# Patient Record
Sex: FEMALE | Race: WHITE | NOT HISPANIC OR LATINO | Employment: FULL TIME | URBAN - METROPOLITAN AREA
[De-identification: names, ages, dates, MRNs, and addresses within clinical notes are randomized per-mention and may not be internally consistent; named-entity substitution may affect disease eponyms.]

---

## 2017-05-22 ENCOUNTER — PRENATAL OFFICE VISIT (OUTPATIENT)
Dept: OBGYN | Facility: CLINIC | Age: 32
End: 2017-05-22
Payer: COMMERCIAL

## 2017-05-22 DIAGNOSIS — Z34.80 PRENATAL CARE, SUBSEQUENT PREGNANCY: Primary | ICD-10-CM

## 2017-05-22 PROCEDURE — 99207 ZZC NO CHARGE NURSE ONLY: CPT | Performed by: OBSTETRICS & GYNECOLOGY

## 2017-05-22 RX ORDER — PRENATAL VIT/IRON FUM/FOLIC AC 27MG-0.8MG
1 TABLET ORAL DAILY
COMMUNITY
Start: 2017-05-19 | End: 2018-02-19

## 2017-05-22 NOTE — MR AVS SNAPSHOT
"              After Visit Summary   5/22/2017    Sandra Steve    MRN: [id number]           Patient Information     Date Of Birth          1985        Visit Information        Provider Department      5/22/2017 6:00 PM Chika Phillips MD [facility]        Today's Diagnoses     Prenatal care, subsequent pregnancy    -  1       Follow-ups after your visit        Your next 10 appointments already scheduled     May 23, 2017  1:15 PM CDT   New Prenatal with Chika Phillips MD, [facility] 1   [facility] ([facility])    [address] [address]   [phone]              Who to contact     If you have questions or need follow up information about today's clinic visit or your schedule please contact [facility] directly at [phone].  Normal or non-critical lab and imaging results will be communicated to you by MyChart, letter or phone within 4 business days after the clinic has received the results. If you do not hear from us within 7 days, please contact the clinic through MyChart or phone. If you have a critical or abnormal lab result, we will notify you by phone as soon as possible.  Submit refill requests through Solvonics or call your pharmacy and they will forward the refill request to us. Please allow 3 business days for your refill to be completed.          Additional Information About Your Visit        MyChart Information     Solvonics lets you send messages to your doctor, view your test results, renew your prescriptions, schedule appointments and more. To sign up, go to [email or web address] . Click on \"Log in\" on the left side of the screen, which will take you to the Welcome page. Then click on \"Sign up Now\" on the right side of the page.     You will be asked to enter the access code listed below, as well as some personal information. Please follow the directions to create your username and password.     Your access " code is: [email or web address]  Expires: 2017  6:14 PM     Your access code will  in 90 days. If you need help or a new code, please call your [place] clinic or [phone].        Care EveryWhere ID     This is your Care EveryWhere ID. This could be used by other organizations to access your [place] medical records  [id number]        Your Vitals Were     Last Period                   (LMP Unknown)            Blood Pressure from Last 3 Encounters:   No data found for BP    Weight from Last 3 Encounters:   No data found for Wt              Today, you had the following     No orders found for display       Primary Care Provider    None Specified       No primary provider on file.        Thank you!     Thank you for choosing [facility]  for your care. Our goal is always to provide you with excellent care. Hearing back from our patients is one way we can continue to improve our services. Please take a few minutes to complete the written survey that you may receive in the mail after your visit with us. Thank you!             Your Updated Medication List - Protect others around you: Learn how to safely use, store and throw away your medicines at www.disposemymeds.org.          This list is accurate as of: 17  6:14 PM.  Always use your most recent med list.                   Brand Name Dispense Instructions for use    prenatal multivitamin  plus iron 27-0.8 MG Tabs per tablet      Take 1 tablet by mouth daily

## 2017-05-23 ENCOUNTER — PRENATAL OFFICE VISIT (OUTPATIENT)
Dept: OBGYN | Facility: CLINIC | Age: 32
End: 2017-05-23
Payer: COMMERCIAL

## 2017-05-23 VITALS
DIASTOLIC BLOOD PRESSURE: 62 MMHG | HEART RATE: 64 BPM | BODY MASS INDEX: 27.36 KG/M2 | WEIGHT: 154.4 LBS | HEIGHT: 63 IN | SYSTOLIC BLOOD PRESSURE: 104 MMHG

## 2017-05-23 DIAGNOSIS — Z34.90 EARLY STAGE OF PREGNANCY: Primary | ICD-10-CM

## 2017-05-23 PROCEDURE — 76817 TRANSVAGINAL US OBSTETRIC: CPT | Performed by: OBSTETRICS & GYNECOLOGY

## 2017-05-23 PROCEDURE — 99202 OFFICE O/P NEW SF 15 MIN: CPT | Mod: 25 | Performed by: OBSTETRICS & GYNECOLOGY

## 2017-05-23 NOTE — MR AVS SNAPSHOT
"              After Visit Summary   2017    Sandra Steve    MRN: [id number]           Patient Information     Date Of Birth          1985        Visit Information        Provider Department      2017 1:15 PM Chika Phillips MD; [facility]        Today's Diagnoses     Early stage of pregnancy    -  1       Follow-ups after your visit        Who to contact     If you have questions or need follow up information about today's clinic visit or your schedule please contact [facility] directly at [phone].  Normal or non-critical lab and imaging results will be communicated to you by Jaschahart, letter or phone within 4 business days after the clinic has received the results. If you do not hear from us within 7 days, please contact the clinic through Jaschahart or phone. If you have a critical or abnormal lab result, we will notify you by phone as soon as possible.  Submit refill requests through Ringio or call your pharmacy and they will forward the refill request to us. Please allow 3 business days for your refill to be completed.          Additional Information About Your Visit        MyChart Information     Ringio lets you send messages to your doctor, view your test results, renew your prescriptions, schedule appointments and more. To sign up, go to [email or web address] . Click on \"Log in\" on the left side of the screen, which will take you to the Welcome page. Then click on \"Sign up Now\" on the right side of the page.     You will be asked to enter the access code listed below, as well as some personal information. Please follow the directions to create your username and password.     Your access code is: [email or web address]  Expires: 2017  6:14 PM     Your access code will  in 90 days. If you need help or a new code, please call your [facility] or [phone].        Care EveryWhere ID     This is your Care EveryWhere ID. This could be used " "by other organizations to access your [place] medical records  [id number]        Your Vitals Were     Pulse Height Last Period BMI (Body Mass Index)          64 5' 3\" (1.6 m) (LMP Unknown) 27.35 kg/m2         Blood Pressure from Last 3 Encounters:   05/23/17 104/62    Weight from Last 3 Encounters:   05/23/17 154 lb 6.4 oz (70 kg)              We Performed the Following     US OB Transvaginal Only        Primary Care Provider    None Specified       No primary provider on file.        Thank you!     Thank you for choosing [facility]  for your care. Our goal is always to provide you with excellent care. Hearing back from our patients is one way we can continue to improve our services. Please take a few minutes to complete the written survey that you may receive in the mail after your visit with us. Thank you!             Your Updated Medication List - Protect others around you: Learn how to safely use, store and throw away your medicines at www.disposemymeds.org.          This list is accurate as of: 5/23/17  2:57 PM.  Always use your most recent med list.                   Brand Name Dispense Instructions for use    prenatal multivitamin  plus iron 27-0.8 MG Tabs per tablet      Take 1 tablet by mouth daily         "

## 2017-05-23 NOTE — NURSING NOTE
"Chief Complaint   Patient presents with     Prenatal Care       Initial /62 (BP Location: Left arm, Patient Position: Chair, Cuff Size: Adult Regular)  Pulse 64  Ht 5' 3\" (1.6 m)  Wt 154 lb 6.4 oz (70 kg)  LMP  (LMP Unknown)  BMI 27.35 kg/m2 Estimated body mass index is 27.35 kg/(m^2) as calculated from the following:    Height as of this encounter: 5' 3\" (1.6 m).    Weight as of this encounter: 154 lb 6.4 oz (70 kg).  Medication Reconciliation: complete     Natalie Jackson CMA      "

## 2017-05-23 NOTE — PROGRESS NOTES
"Sandra is a 31 year old  here for follow up of viability.  Patient was trying for awhile, but conceived when she stopped \"trying\".  Unsure of how far along she is.  Denies bleeding or cramping.      ROS: Ten point review of systems was reviewed and negative except the above.    Gyne: - abn pap (last pap ), - STD's    PMH: Her past medical, surgical, and obstetric histories were reviewed and are documented in their appropriate chart areas.    ALL/Meds: Her medication and allergy histories were reviewed and are documented in their appropriate chart areas.    SH: - tob, - EtOH,     PE: /62 (BP Location: Left arm, Patient Position: Chair, Cuff Size: Adult Regular)  Pulse 64  Ht 5' 3\" (1.6 m)  Wt 154 lb 6.4 oz (70 kg)  LMP  (LMP Unknown)  BMI 27.35 kg/m2    General Appearance:  healthy, alert, active, no distress  HEENT: NCAT    Transvaginal ultrasound was performed.  An intrauterine pregnancy was seen.  Gestational sac present with yolk sac.  Very tiny fetal pole seen.  Based on gestational sac, gestational age is roughly 5-6 weeks.      A/P 31 year old  here for     ICD-10-CM    1. Early stage of pregnancy Z33.1 US OB Transvaginal Only        1. Patient reassured that pregnancy is intrauterine.  However, cannot give her a specific JAIRO.  Plan repeat U/S in 2 weeks for reassessment and 1st OB visit    Chika Phillips M.D.      15 minutes was spent face to face with the patient today discussing her history, diagnosis, and follow-up plan as noted above.  Over 50% of the visit was spent in counseling and coordination of care.    Total Visit Time: 20 minutes.   "

## 2017-05-24 ENCOUNTER — TELEPHONE (OUTPATIENT)
Dept: OBGYN | Facility: CLINIC | Age: 32
End: 2017-05-24

## 2017-06-06 ENCOUNTER — PRENATAL OFFICE VISIT (OUTPATIENT)
Dept: OBGYN | Facility: CLINIC | Age: 32
End: 2017-06-06
Payer: COMMERCIAL

## 2017-06-06 VITALS
BODY MASS INDEX: 27.46 KG/M2 | HEIGHT: 63 IN | DIASTOLIC BLOOD PRESSURE: 55 MMHG | HEART RATE: 62 BPM | WEIGHT: 155 LBS | SYSTOLIC BLOOD PRESSURE: 109 MMHG

## 2017-06-06 DIAGNOSIS — Z34.81 PRENATAL CARE, SUBSEQUENT PREGNANCY, FIRST TRIMESTER: Primary | ICD-10-CM

## 2017-06-06 PROCEDURE — 99214 OFFICE O/P EST MOD 30 MIN: CPT | Performed by: OBSTETRICS & GYNECOLOGY

## 2017-06-06 NOTE — PROGRESS NOTES
"Sandra is a 31 year old  @ ? weeks here for new ob visit.        ROS: Ten point review of systems was reviewed and negative except the above.  Current Issues include: fatigue    OBhx: C-sec x 1  Gyne: Pap smears Normal  history of STD No STD history  Past Medical History:   Diagnosis Date     Anxiety      Asthma      Chickenpox      Depression      Past Surgical History:   Procedure Laterality Date     C/SECTION, LOW TRANSVERSE  2015     Patient Active Problem List    Diagnosis Date Noted     Prenatal care, subsequent pregnancy 2017     Priority: Medium      No Known Allergies    Current Outpatient Prescriptions on File Prior to Visit:  Prenatal Vit-Fe Fumarate-FA (PRENATAL MULTIVITAMIN  PLUS IRON) 27-0.8 MG TABS per tablet Take 1 tablet by mouth daily     No current facility-administered medications on file prior to visit.     Past Medical History of Father of Baby:   No significant medical history    Physical Exam: /55 (BP Location: Left arm, Patient Position: Chair, Cuff Size: Adult Small)  Pulse 62  Ht 5' 3\" (1.6 m)  Wt 155 lb (70.3 kg)  LMP  (LMP Unknown)  BMI 27.46 kg/m2  General: Well developed, well nourished female  Skin: Normal  HEENT: Normal  Neck: Supple,no adenopathy,thyroid normal  Chest: Clear  Heart: Regular rate, rhythm,No murmur, rub, gallop  Breasts: Not examined   Abdomen: Benign,Soft, flat, non-tender,No masses, organomegaly,No inguinal nodes,Bowel sounds normoactive   Extremities: Normal  Neurological: Normal   Perineum: Normal   Vulva: Normal    Transvaginal ultrasound was performed.  A viable intrauterine pregnancy was seen.  CRL consistent with 7 weeks, 4 days.  Fetal heart motion was visualized.    EDC by LMP: ?   EDC by sono:  18  Final EDC: 18    A/P 31 year old  at  7.4 weeks    1. Discussed physician coverage, tertiary support, diet, exercise, weight gain, schedule of visits, routine and indicated ultrasounds, and childbirth education.    2. " Options for  testing for chromosomal and birth defects were discussed with the patient including nuchal lucency/blood marker testing in the first trimester and quad screening and/or Level 2 ultrasound in the second trimester.  We discussed that these are screening tests and not diagnostic tests and that false positives and negatives are a distinct possibility.  We discussed that follow up diagnostic testing would include chorionic villus sampling or amniocentesis depending on gestational age.    3. Prenatal labs    4. Prenatal Vitamins    Chika Phillips M.D.

## 2017-06-06 NOTE — MR AVS SNAPSHOT
"              After Visit Summary   6/6/2017    Sandra Steve    MRN: [id number]           Patient Information     Date Of Birth          1985        Visit Information        Provider Department      6/6/2017 11:30 AM Chika Phillips MD; [facility]        Today's Diagnoses     Prenatal care, subsequent pregnancy, first trimester    -  1       Follow-ups after your visit        Your next 10 appointments already scheduled     Jul 06, 2017  4:00 PM CDT   ESTABLISHED PRENATAL with José Miguel Tomas MD   [facility] ([facility]    [address] [address]   [phone]              Who to contact     If you have questions or need follow up information about today's clinic visit or your schedule please contact [facility] directly at [phone].  Normal or non-critical lab and imaging results will be communicated to you by MyChart, letter or phone within 4 business days after the clinic has received the results. If you do not hear from us within 7 days, please contact the clinic through MyChart or phone. If you have a critical or abnormal lab result, we will notify you by phone as soon as possible.  Submit refill requests through ViRTUAL INTERACTiVE or call your pharmacy and they will forward the refill request to us. Please allow 3 business days for your refill to be completed.          Additional Information About Your Visit        MyChart Information     ViRTUAL INTERACT[employer]VE lets you send messages to your doctor, view your test results, renew your prescriptions, schedule appointments and more. To sign up, go to [email or web address] . Click on \"Log in\" on the left side of the screen, which will take you to the Welcome page. Then click on \"Sign up Now\" on the right side of the page.     You will be asked to enter the access code listed below, as well as some personal information. Please follow the directions to create your username and " "password.     Your access code is: [email or web address]  Expires: 2017  6:14 PM     Your access code will  in 90 days. If you need help or a new code, please call your [place] clinic or [phone].        Care EveryWhere ID     This is your Care EveryWhere ID. This could be used by other organizations to access your [place] medical records  [id number]        Your Vitals Were     Pulse Height Last Period BMI (Body Mass Index)          62 5' 3\" (1.6 m) (LMP Unknown) 27.46 kg/m2         Blood Pressure from Last 3 Encounters:   17 109/55   17 104/62    Weight from Last 3 Encounters:   17 155 lb (70.3 kg)   17 154 lb 6.4 oz (70 kg)              We Performed the Following     ABO/Rh type and screen     Anti Treponema     CBC with platelets     Hepatitis B surface antigen     HIV Antigen Antibody Combo     Rubella Antibody IgG Quantitative     US OB <14 Weeks w Transvaginal Single        Primary Care Provider    None Specified       No primary provider on file.        Thank you!     Thank you for choosing [facility]  for your care. Our goal is always to provide you with excellent care. Hearing back from our patients is one way we can continue to improve our services. Please take a few minutes to complete the written survey that you may receive in the mail after your visit with us. Thank you!             Your Updated Medication List - Protect others around you: Learn how to safely use, store and throw away your medicines at www.disposemymeds.org.          This list is accurate as of: 17 11:50 AM.  Always use your most recent med list.                   Brand Name Dispense Instructions for use    prenatal multivitamin  plus iron 27-0.8 MG Tabs per tablet      Take 1 tablet by mouth daily         "

## 2017-06-06 NOTE — NURSING NOTE
"Initial /55 (BP Location: Left arm, Patient Position: Chair, Cuff Size: Adult Small)  Pulse 62  Ht 5' 3\" (1.6 m)  Wt 155 lb (70.3 kg)  LMP  (LMP Unknown)  BMI 27.46 kg/m2 Estimated body mass index is 27.46 kg/(m^2) as calculated from the following:    Height as of this encounter: 5' 3\" (1.6 m).    Weight as of this encounter: 155 lb (70.3 kg). .      "

## 2017-07-10 ENCOUNTER — PRENATAL OFFICE VISIT (OUTPATIENT)
Dept: OBGYN | Facility: CLINIC | Age: 32
End: 2017-07-10

## 2017-07-10 VITALS
HEART RATE: 64 BPM | HEIGHT: 63 IN | BODY MASS INDEX: 29.02 KG/M2 | DIASTOLIC BLOOD PRESSURE: 62 MMHG | WEIGHT: 163.8 LBS | SYSTOLIC BLOOD PRESSURE: 102 MMHG

## 2017-07-10 DIAGNOSIS — Z98.891 H/O: C-SECTION: ICD-10-CM

## 2017-07-10 DIAGNOSIS — Z34.81 PRENATAL CARE, SUBSEQUENT PREGNANCY, FIRST TRIMESTER: ICD-10-CM

## 2017-07-10 LAB
ABO + RH BLD: NORMAL
ABO + RH BLD: NORMAL
BLD GP AB SCN SERPL QL: NORMAL
BLOOD BANK CMNT PATIENT-IMP: NORMAL
ERYTHROCYTE [DISTWIDTH] IN BLOOD BY AUTOMATED COUNT: 12.5 % (ref 10–15)
HCT VFR BLD AUTO: 39.2 % (ref 35–47)
HGB BLD-MCNC: 13.2 G/DL (ref 11.7–15.7)
MCH RBC QN AUTO: 29.3 PG (ref 26.5–33)
MCHC RBC AUTO-ENTMCNC: 33.7 G/DL (ref 31.5–36.5)
MCV RBC AUTO: 87 FL (ref 78–100)
PLATELET # BLD AUTO: 202 10E9/L (ref 150–450)
RBC # BLD AUTO: 4.5 10E12/L (ref 3.8–5.2)
SPECIMEN EXP DATE BLD: NORMAL
WBC # BLD AUTO: 9.5 10E9/L (ref 4–11)

## 2017-07-10 PROCEDURE — 86901 BLOOD TYPING SEROLOGIC RH(D): CPT | Performed by: OBSTETRICS & GYNECOLOGY

## 2017-07-10 PROCEDURE — 87389 HIV-1 AG W/HIV-1&-2 AB AG IA: CPT | Performed by: OBSTETRICS & GYNECOLOGY

## 2017-07-10 PROCEDURE — 86762 RUBELLA ANTIBODY: CPT | Performed by: OBSTETRICS & GYNECOLOGY

## 2017-07-10 PROCEDURE — 86900 BLOOD TYPING SEROLOGIC ABO: CPT | Performed by: OBSTETRICS & GYNECOLOGY

## 2017-07-10 PROCEDURE — 99212 OFFICE O/P EST SF 10 MIN: CPT | Performed by: OBSTETRICS & GYNECOLOGY

## 2017-07-10 PROCEDURE — 86780 TREPONEMA PALLIDUM: CPT | Performed by: OBSTETRICS & GYNECOLOGY

## 2017-07-10 PROCEDURE — 85027 COMPLETE CBC AUTOMATED: CPT | Performed by: OBSTETRICS & GYNECOLOGY

## 2017-07-10 PROCEDURE — 86850 RBC ANTIBODY SCREEN: CPT | Performed by: OBSTETRICS & GYNECOLOGY

## 2017-07-10 PROCEDURE — 36415 COLL VENOUS BLD VENIPUNCTURE: CPT | Performed by: OBSTETRICS & GYNECOLOGY

## 2017-07-10 PROCEDURE — 87340 HEPATITIS B SURFACE AG IA: CPT | Performed by: OBSTETRICS & GYNECOLOGY

## 2017-07-10 NOTE — NURSING NOTE
"Chief Complaint   Patient presents with     Prenatal Care       Initial /62 (BP Location: Right arm, Patient Position: Chair, Cuff Size: Adult Regular)  Pulse 64  Ht 5' 3\" (1.6 m)  Wt 163 lb 12.8 oz (74.3 kg)  LMP  (LMP Unknown)  Breastfeeding? No  BMI 29.02 kg/m2 Estimated body mass index is 29.02 kg/(m^2) as calculated from the following:    Height as of this encounter: 5' 3\" (1.6 m).    Weight as of this encounter: 163 lb 12.8 oz (74.3 kg).  Medication Reconciliation: complete   Shonna Khan, DADA      "

## 2017-07-10 NOTE — PROGRESS NOTES
"CC: Here for routine prenatal visit @ 12w3d   HPI: no cramping or bleeding.  No complaints.     PE: /62 (BP Location: Right arm, Patient Position: Chair, Cuff Size: Adult Regular)  Pulse 64  Ht 5' 3\" (1.6 m)  Wt 163 lb 12.8 oz (74.3 kg)  LMP  (LMP Unknown)  Breastfeeding? No  BMI 29.02 kg/m2   See OB flowsheet    A/P  @ 12w3d normal pregnancy    1. Routine prenatal care    RTC 4 weeks.      Chika Phillips M.D.    "

## 2017-07-10 NOTE — MR AVS SNAPSHOT
"              After Visit Summary   7/10/2017    Sandra Steve    MRN: [id number]           Patient Information     Date Of Birth          1985        Visit Information        Provider Department      7/10/2017 4:00 PM Chika Phillips MD [facility]        Today's Diagnoses     Prenatal care, subsequent pregnancy, first trimester        H/O:            Follow-ups after your visit        Who to contact     If you have questions or need follow up information about today's clinic visit or your schedule please contact [facility] directly at [phone].  Normal or non-critical lab and imaging results will be communicated to you by Firmafonhart, letter or phone within 4 business days after the clinic has received the results. If you do not hear from us within 7 days, please contact the clinic through Firmafonhart or phone. If you have a critical or abnormal lab result, we will notify you by phone as soon as possible.  Submit refill requests through Digby or call your pharmacy and they will forward the refill request to us. Please allow 3 business days for your refill to be completed.          Additional Information About Your Visit        MyChart Information     Digby lets you send messages to your doctor, view your test results, renew your prescriptions, schedule appointments and more. To sign up, go to [email or web address] . Click on \"Log in\" on the left side of the screen, which will take you to the Welcome page. Then click on \"Sign up Now\" on the right side of the page.     You will be asked to enter the access code listed below, as well as some personal information. Please follow the directions to create your username and password.     Your access code is: [email or web address]  Expires: 2017  6:14 PM     Your access code will  in 90 days. If you need help or a new code, please call your [facility] or [phone].        Care EveryWhere ID     This is your Care " "EveryWhere ID. This could be used by other organizations to access your [place] medical records  [id number]        Your Vitals Were     Pulse Height Last Period Breastfeeding? BMI (Body Mass Index)       64 5' 3\" (1.6 m) (LMP Unknown) No 29.02 kg/m2        Blood Pressure from Last 3 Encounters:   07/10/17 102/62   06/06/17 109/55   05/23/17 104/62    Weight from Last 3 Encounters:   07/10/17 163 lb 12.8 oz (74.3 kg)   06/06/17 155 lb (70.3 kg)   05/23/17 154 lb 6.4 oz (70 kg)              We Performed the Following     ABO/Rh type and screen     Anti Treponema     CBC with platelets     Hepatitis B surface antigen     HIV Antigen Antibody Combo     Rubella Antibody IgG Quantitative        Primary Care Provider    None Specified       No primary provider on file.        Equal Access to Services     LISA FRASER : Jaime Bentley, randall hatch, marleen middleton, kenia tapia . So [facility] [phone].    ATENCIÓN: Si habla español, tiene a tejeda disposición servicios gratuitos de asistencia lingüística. Llame al [phone].    We comply with applicable federal civil rights laws and [place] laws. We do not discriminate on the basis of race, color, national origin, age, disability sex, sexual orientation or gender identity.            Thank you!     Thank you for choosing [facility]  for your care. Our goal is always to provide you with excellent care. Hearing back from our patients is one way we can continue to improve our services. Please take a few minutes to complete the written survey that you may receive in the mail after your visit with us. Thank you!             Your Updated Medication List - Protect others around you: Learn how to safely use, store and throw away your medicines at www.disposemymeds.org.          This list is accurate as of: 7/10/17  4:27 PM.  Always use your most recent med list.                   Brand Name Dispense " Instructions for use Diagnosis    prenatal multivitamin  plus iron 27-0.8 MG Tabs per tablet      Take 1 tablet by mouth daily

## 2017-07-11 LAB
HBV SURFACE AG SERPL QL IA: NONREACTIVE
HIV 1+2 AB+HIV1 P24 AG SERPL QL IA: NORMAL
T PALLIDUM IGG+IGM SER QL: NEGATIVE

## 2017-07-12 LAB — RUBV IGG SERPL IA-ACNC: 25 IU/ML

## 2017-08-14 ENCOUNTER — PRENATAL OFFICE VISIT (OUTPATIENT)
Dept: OBGYN | Facility: CLINIC | Age: 32
End: 2017-08-14
Payer: COMMERCIAL

## 2017-08-14 VITALS
HEART RATE: 68 BPM | WEIGHT: 162.8 LBS | BODY MASS INDEX: 28.84 KG/M2 | DIASTOLIC BLOOD PRESSURE: 64 MMHG | SYSTOLIC BLOOD PRESSURE: 111 MMHG | HEIGHT: 63 IN

## 2017-08-14 DIAGNOSIS — Z98.891 H/O: C-SECTION: ICD-10-CM

## 2017-08-14 DIAGNOSIS — Z34.82 PRENATAL CARE, SUBSEQUENT PREGNANCY, SECOND TRIMESTER: Primary | ICD-10-CM

## 2017-08-14 PROCEDURE — 99207 ZZC PRENATAL VISIT: CPT | Performed by: OBSTETRICS & GYNECOLOGY

## 2017-08-14 NOTE — NURSING NOTE
"Chief Complaint   Patient presents with     Prenatal Care       Initial /64 (BP Location: Right arm, Patient Position: Chair, Cuff Size: Adult Regular)  Pulse 68  Ht 5' 3\" (1.6 m)  Wt 162 lb 12.8 oz (73.8 kg)  LMP  (LMP Unknown)  Breastfeeding? No  BMI 28.84 kg/m2 Estimated body mass index is 28.84 kg/(m^2) as calculated from the following:    Height as of this encounter: 5' 3\" (1.6 m).    Weight as of this encounter: 162 lb 12.8 oz (73.8 kg).  Medication Reconciliation: complete   Mignon Townsend CMA      "

## 2017-08-14 NOTE — PROGRESS NOTES
"CC: Here for routine prenatal visit @ 17w3d   HPI: + FM, no ctx, no LOF, no VB.  No complaints.     PE: /64 (BP Location: Right arm, Patient Position: Chair, Cuff Size: Adult Regular)  Pulse 68  Ht 5' 3\" (1.6 m)  Wt 162 lb 12.8 oz (73.8 kg)  LMP  (LMP Unknown)  Breastfeeding? No  BMI 28.84 kg/m2   See OB flowsheet    A/P  @ 17w3d normal pregnancy    1. Routine prenatal care.  Genetic screening declined    RTC 3-4 weeks.      Chika Phillips M.D.    "

## 2017-08-14 NOTE — MR AVS SNAPSHOT
"              After Visit Summary   2017    Sandra Steve    MRN: [id number]           Patient Information     Date Of Birth          1985        Visit Information        Provider Department      2017 3:00 PM Chika Phillips MD [facility]        Today's Diagnoses     Prenatal care, subsequent pregnancy, second trimester    -  1    H/O:            Follow-ups after your visit        Future tests that were ordered for you today     Open Future Orders        Priority Expected Expires Ordered    US OB > 14 Weeks Complete Single Routine 2017            Who to contact     If you have questions or need follow up information about today's clinic visit or your schedule please contact [facility] directly at [phone].  Normal or non-critical lab and imaging results will be communicated to you by MyChart, letter or phone within 4 business days after the clinic has received the results. If you do not hear from us within 7 days, please contact the clinic through MyChart or phone. If you have a critical or abnormal lab result, we will notify you by phone as soon as possible.  Submit refill requests through [email or web address] or call your pharmacy and they will forward the refill request to us. Please allow 3 business days for your refill to be completed.          Additional Information About Your Visit        [employer] Information     [email or web address] lets you send messages to your doctor, view your test results, renew your prescriptions, schedule appointments and more. To sign up, go to [email or web address] . Click on \"Log in\" on the left side of the screen, which will take you to the Welcome page. Then click on \"Sign up Now\" on the right side of the page.     You will be asked to enter the access code listed below, as well as some personal information. Please follow the directions to create your username and password.     Your access code is: [email or web address]  Expires: " "2017  6:14 PM     Your access code will  in 90 days. If you need help or a new code, please call your [place] clinic or [phone].        Care EveryWhere ID     This is your Care EveryWhere ID. This could be used by other organizations to access your [place] medical records  [id number]        Your Vitals Were     Pulse Height Last Period Breastfeeding? BMI (Body Mass Index)       68 5' 3\" (1.6 m) (LMP Unknown) No 28.84 kg/m2        Blood Pressure from Last 3 Encounters:   17 111/64   07/10/17 102/62   17 109/55    Weight from Last 3 Encounters:   17 162 lb 12.8 oz (73.8 kg)   07/10/17 163 lb 12.8 oz (74.3 kg)   17 155 lb (70.3 kg)               Primary Care Provider    None Specified       No primary provider on file.        Equal Access to Services     LISA FRASER : Hadii gayle Bentley, waxavierda holden, qaybta kaalmada emi, kenia tapia . So [facility] [phone].    ATENCIÓN: Si habla español, tiene a tejeda disposición servicios gratuitos de asistencia lingüística. Llame al [phone].    We comply with applicable federal civil rights laws and [place] laws. We do not discriminate on the basis of race, color, national origin, age, disability sex, sexual orientation or gender identity.            Thank you!     Thank you for choosing [facility]  for your care. Our goal is always to provide you with excellent care. Hearing back from our patients is one way we can continue to improve our services. Please take a few minutes to complete the written survey that you may receive in the mail after your visit with us. Thank you!             Your Updated Medication List - Protect others around you: Learn how to safely use, store and throw away your medicines at www.disposemymeds.org.          This list is accurate as of: 17  3:20 PM.  Always use your most recent med list.                   Brand Name Dispense Instructions " for use Diagnosis    prenatal multivitamin plus iron 27-0.8 MG Tabs per tablet      Take 1 tablet by mouth daily

## 2017-09-05 ENCOUNTER — HOSPITAL ENCOUNTER (OUTPATIENT)
Dept: ULTRASOUND IMAGING | Facility: CLINIC | Age: 32
Discharge: HOME | End: 2017-09-05
Attending: OBSTETRICS & GYNECOLOGY | Admitting: OBSTETRICS & GYNECOLOGY
Payer: COMMERCIAL

## 2017-09-05 DIAGNOSIS — Z34.82 PRENATAL CARE, SUBSEQUENT PREGNANCY, SECOND TRIMESTER: ICD-10-CM

## 2017-09-05 PROCEDURE — 76805 OB US >/= 14 WKS SNGL FETUS: CPT

## 2017-09-06 ENCOUNTER — PRENATAL OFFICE VISIT (OUTPATIENT)
Dept: OBGYN | Facility: CLINIC | Age: 32
End: 2017-09-06
Payer: COMMERCIAL

## 2017-09-06 VITALS
SYSTOLIC BLOOD PRESSURE: 111 MMHG | HEART RATE: 84 BPM | WEIGHT: 167 LBS | DIASTOLIC BLOOD PRESSURE: 64 MMHG | BODY MASS INDEX: 29.59 KG/M2 | HEIGHT: 63 IN

## 2017-09-06 DIAGNOSIS — Z3A.20 20 WEEKS GESTATION OF PREGNANCY: Primary | ICD-10-CM

## 2017-09-06 PROCEDURE — 99207 ZZC PRENATAL VISIT: CPT | Performed by: OBSTETRICS & GYNECOLOGY

## 2017-09-06 NOTE — NURSING NOTE
"Initial /64 (BP Location: Right arm, Patient Position: Chair, Cuff Size: Adult Large)  Pulse 84  Ht 5' 3\" (1.6 m)  Wt 167 lb (75.8 kg)  LMP  (LMP Unknown)  BMI 29.58 kg/m2 Estimated body mass index is 29.58 kg/(m^2) as calculated from the following:    Height as of this encounter: 5' 3\" (1.6 m).    Weight as of this encounter: 167 lb (75.8 kg). .      "

## 2017-09-06 NOTE — PROGRESS NOTES
"CC: Here for routine prenatal visit @ 20w5d   HPI:  Feeling well; denies contractions or LOF; sonogram yesterday says it's a BOY    PE: /64 (BP Location: Right arm, Patient Position: Chair, Cuff Size: Adult Large)  Pulse 84  Ht 5' 3\" (1.6 m)  Wt 167 lb (75.8 kg)  LMP  (LMP Unknown)  BMI 29.58 kg/m2   See OB flowsheet        A:  1. 20 weeks gestation of pregnancy    - Glucose tolerance gest screen 1 hour; Future  - OB hemoglobin; Future  - Anti Treponema; Future        Routine prenatal care  RTC 4 weeks.      Silke Borja M.D.     "

## 2017-09-06 NOTE — MR AVS SNAPSHOT
"              After Visit Summary   9/6/2017    Sandra Steve    MRN: [id number]           Patient Information     Date Of Birth          1985        Visit Information        Provider Department      9/6/2017 3:45 PM Silke Borja MD [facility]        Today's Diagnoses     20 weeks gestation of pregnancy    -  1       Follow-ups after your visit        Future tests that were ordered for you today     Open Future Orders        Priority Expected Expires Ordered    Glucose tolerance gest screen 1 hour Routine  11/6/2017 9/6/2017    OB hemoglobin Routine  11/6/2017 9/6/2017    Anti Treponema Routine  11/6/2017 9/6/2017            Who to contact     If you have questions or need follow up information about today's clinic visit or your schedule please contact [facility] directly at [phone].  Normal or non-critical lab and imaging results will be communicated to you by [employer], letter or phone within 4 business days after the clinic has received the results. If you do not hear from us within 7 days, please contact the clinic through [employer] or phone. If you have a critical or abnormal lab result, we will notify you by phone as soon as possible.  Submit refill requests through [employer]Shop or call your pharmacy and they will forward the refill request to us. Please allow 3 business days for your refill to be completed.          Additional Information About Your Visit        MyChart Information     VendorShop lets you send messages to your doctor, view your test results, renew your prescriptions, schedule appointments and more. To sign up, go to [email or web address] . Click on \"Log in\" on the left side of the screen, which will take you to the Welcome page. Then click on \"Sign up Now\" on the right side of the page.     You will be asked to enter the access code listed below, as well as some personal information. Please follow the directions to create your username and password.   " "  Your access code is: [email or web address]  Expires: 2017  3:55 PM     Your access code will  in 90 days. If you need help or a new code, please call your [place] clinic or [phone].        Care EveryWhere ID     This is your Care EveryWhere ID. This could be used by other organizations to access your [place] medical records  [id number]        Your Vitals Were     Pulse Height Last Period BMI (Body Mass Index)          84 5' 3\" (1.6 m) (LMP Unknown) 29.58 kg/m2         Blood Pressure from Last 3 Encounters:   17 111/64   17 111/64   07/10/17 102/62    Weight from Last 3 Encounters:   17 167 lb (75.8 kg)   17 162 lb 12.8 oz (73.8 kg)   07/10/17 163 lb 12.8 oz (74.3 kg)               Primary Care Provider    None Specified       No primary provider on file.        Equal Access to Services     [facility] HospitalTW[facility] : Hadii aad ku hadasho Soomaali, waaxda luqadaha, qaybta kaalmada adeegyada, waxay hungin hayjaycee tapia . So [facility] [phone].    ATENCIÓN: Si habla español, tiene a tejeda disposición servicios gratuitos de asistencia lingüística. Llame al [phone].    We comply with applicable federal civil rights laws and [place] laws. We do not discriminate on the basis of race, color, national origin, age, disability sex, sexual orientation or gender identity.            Thank you!     Thank you for choosing [facility]  for your care. Our goal is always to provide you with excellent care. Hearing back from our patients is one way we can continue to improve our services. Please take a few minutes to complete the written survey that you may receive in the mail after your visit with us. Thank you!             Your Updated Medication List - Protect others around you: Learn how to safely use, store and throw away your medicines at www.disposemymeds.org.          This list is accurate as of: 17  3:55 PM.  Always use your most recent med list.                   Brand " Name Dispense Instructions for use Diagnosis    prenatal multivitamin plus iron 27-0.8 MG Tabs per tablet      Take 1 tablet by mouth daily

## 2017-09-07 ENCOUNTER — PRE VISIT (OUTPATIENT)
Dept: MATERNAL FETAL MEDICINE | Facility: CLINIC | Age: 32
End: 2017-09-07

## 2017-09-07 ENCOUNTER — TELEPHONE (OUTPATIENT)
Dept: OBGYN | Facility: CLINIC | Age: 32
End: 2017-09-07

## 2017-09-07 DIAGNOSIS — O35.03X0 CHOROID PLEXUS CYST OF FETUS AFFECTING CARE OF MOTHER, ANTEPARTUM, NOT APPLICABLE OR UNSPECIFIED FETUS: Primary | ICD-10-CM

## 2017-09-07 NOTE — TELEPHONE ENCOUNTER
Reviewed with patient the slight increased risk of aneuploidy in the setting of a choroid plexus cyst.  Discussed that these often resolve.  Recommended level 2 U/S to reassess    Chika Phillips M.D.

## 2017-09-11 ENCOUNTER — OFFICE VISIT (OUTPATIENT)
Dept: MATERNAL FETAL MEDICINE | Facility: CLINIC | Age: 32
End: 2017-09-11
Attending: OBSTETRICS & GYNECOLOGY
Payer: COMMERCIAL

## 2017-09-11 ENCOUNTER — HOSPITAL ENCOUNTER (OUTPATIENT)
Dept: ULTRASOUND IMAGING | Facility: CLINIC | Age: 32
Discharge: HOME | End: 2017-09-11
Attending: OBSTETRICS & GYNECOLOGY | Admitting: OBSTETRICS & GYNECOLOGY
Payer: COMMERCIAL

## 2017-09-11 DIAGNOSIS — Z98.891 H/O: C-SECTION: ICD-10-CM

## 2017-09-11 DIAGNOSIS — O26.90 PREGNANCY RELATED CONDITION, UNSPECIFIED TRIMESTER: ICD-10-CM

## 2017-09-11 DIAGNOSIS — O28.3 ABNORMAL ULTRASONIC FINDING ON ANTENATAL SCREENING OF MOTHER: ICD-10-CM

## 2017-09-11 DIAGNOSIS — O28.3 ABNORMAL ULTRASONIC FINDING ON ANTENATAL SCREENING OF MOTHER: Primary | ICD-10-CM

## 2017-09-11 DIAGNOSIS — O35.9XX0 SUSPECTED FETAL ANOMALY, ANTEPARTUM, NOT APPLICABLE OR UNSPECIFIED FETUS: Primary | ICD-10-CM

## 2017-09-11 DIAGNOSIS — O35.HXX0 CLUB FOOT OF FETUS AFFECTING ANTEPARTUM CARE OF MOTHER, NOT APPLICABLE OR UNSPECIFIED FETUS: ICD-10-CM

## 2017-09-11 PROCEDURE — 40000072 ZZH STATISTIC GENETIC COUNSELING, < 16 MIN: Mod: ZF | Performed by: GENETIC COUNSELOR, MS

## 2017-09-11 PROCEDURE — 40000791 ZZHCL STATISTIC VERIFI PRENATAL TRISOMY 21,18,13: Performed by: OBSTETRICS & GYNECOLOGY

## 2017-09-11 PROCEDURE — 40000954 ZZHCL STATISTIC COUNSYL FAMILY PREP: Performed by: OBSTETRICS & GYNECOLOGY

## 2017-09-11 PROCEDURE — 76811 OB US DETAILED SNGL FETUS: CPT

## 2017-09-11 NOTE — PROGRESS NOTES
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       Impression/Plan:   1. Sandra had an ultrasound today for an outside finding of choroid plexus cysts.  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  Please see ultrasound for complete details.       2.  Sandra had a genetic consultation today to discuss genetic testing options for possible genetic conditions related to ultrasound findings.      3.  The patient had a blood draw for NIPT (Innatal test through [employer]).  Results are expected within 7-10 days, and will be available in [employer].  We will contact her to discuss the results, and a copy will be forwarded to the office of the referring OB provider.    4.  The patient and her  had blood drawn for Spinal Muscular Atrophy carrier testing.  (SMA Carrier Analysis through [employer]) Results are expected in 2-3 weeks and will be available in [employer].  We will contact her to discuss the results, and a copy will be forwarded to the office of the referring OB provider.    5.  Per our discussion today, Sandra will be called at [phone] to discuss results, and results can be left in voicemail if she does not answer.      Pregnancy History:   /Parity:    Age at Delivery: 32 year old  JAIRO: 2018, by Ultrasound  Gestational Age: 21w3d    A detailed pregnancy history was not discussed during today's appointment    Medical History:   Sandra s reported medical history is not expected to impact pregnancy management or risks to fetal development.       Family History:     Family history was not assessed during today's appointment       Carrier Screening:   The patient  reports that she and the father of the pregnancy have  ancestry:      Cystic fibrosis is an autosomal recessive genetic condition that occurs with increased frequency in individuals of  ancestry and carrier screening for this condition is available.  In addition,  screening in the [facility] includes cystic fibrosis.      Expanded carrier screening for mutations in a large panel of genes associated with autosomal recessive conditions including cystic fibrosis, spinal muscular atrophy, and others, is now available.      Based on ultrasound findings, Dr. Rios was concerned for an increased risk for spinal muscular atrophy, an autosomal recessive condition.        The patient elected to pursue carrier screening today.  Her blood was drawn for SMA carrier testing and sent to [employer] [facility] [employer].  We will contact her when these test results become available, and a copy of these results will be relayed to her primary OB provider.       Discussion:     Sandra's ultrasound today noted bilateral club feet and clenched fists.  These can be associated with other anomalies such as spina bifida and arthrogryposis.  These findings can also be associated with chromosome abnormalities such as trisomy 18 or 13, or a recessive condition such as spinal muscular atrophy.  We discussed that club feet also commonly present as isolated findings that are not associated with a genetic condition, and that further ultrasounds will help evaluate the clenched hands over time.      Trisomy 18 or 13 occur typically as sporadic events in pregnancy.  Diagnostic testing such as an amniocentesis can definitively test for the presence or absence of these conditions.  Screening tests such as NIPT has a 97% sensitivity for Trisomy 18 and a 87.5% sensitivity for Trisomy 13.      SMA is inherited in an autosomal recessive manner, meaning that both parents would have to carry a genetic change in the gene responsible (SMN1 in  SMA) and pass that change on to a pregnancy for the pregnancy to be affected.  If both parents are carriers for a condition, the risk for an affected pregnancy is 25%, the risk for the pregnancy to be a carrier is 50%, and the likelihood of the pregnancy not being affected or a carrier is 25%.  Carrier testing of parents can be used to identify at risk pregnancies.  Diagnostic testing via amniocentesis can determine the status of an at risk pregnancy.      During today's conversation we did not spend a great deal of time discussing any of these conditions in detail, but we did discuss in general terms the severity of trisomy 18 and 13, and that SMA can occur on a spectrum from severe to more mild.  Per our plan today, we will discuss these conditions in more detail if there is a concerning test result.  Sandra said that she will probably do some self-education online regarding these conditions, but may stop if she finds the information distressing before knowing any test results.           Testing Options:   We discussed the following options:   Non-invasive Prenatal Testing (NIPT)    Maternal plasma cell-free DNA testing; first trimester ultrasound with nuchal translucency and nasal bone assessment is recommended, when appropriate    Screens for fetal trisomy 21, trisomy 13, trisomy 18, and sex chromosome aneuploidy    Cannot screen for open neural tube defects; maternal serum AFP after 15 weeks is recommended     Genetic Amniocentesis    Invasive procedure typically performed in the second trimester by which amniotic fluid is obtained for the purpose of chromosome analysis and/or other prenatal genetic analysis    Diagnostic results; >99% sensitivity for fetal chromosome abnormalities    AFAFP measurement tests for open neural tube defects    We reviewed the benefits and limitations of this testing.  Screening tests provide a risk assessment specific to the pregnancy for certain fetal chromosome abnormalities, but  cannot definitively diagnose or exclude a fetal chromosome abnormality.  Follow-up genetic counseling and consideration of diagnostic testing is recommended with any abnormal screening result.     Diagnostic tests carry inherent risks- including risk of miscarriage- that require careful consideration.  These tests can detect fetal chromosome abnormalities with greater than 99% certainty.  Results can be compromised by maternal cell contamination or mosaicism, and are limited by the resolution of cytogenetic G-banding technology.  There is no screening nor diagnostic test that can detect all forms of birth defects or mental disability.      During our consultation today, Sandra decided that at this time, she does not wish to pursue any invasive testing, but that she does want additional information.  NIPT for chromosome abnormalities and carrier screening for SMA are initial testing options that pose no risk to the pregnancy, but allow us to gather more information for Sandra and her  as they proceed through pregnancy.       It was a pleasure to be involved with Sandra s care. Face-to-face time of the meeting was 25 minutes.      Remberto Toney MS  Genetic Counselor  Phone: [phone]  Pager: [phone]

## 2017-09-11 NOTE — PROGRESS NOTES
"Please see \"Imaging\" tab under \"Chart Review\" for details of today's US.    Hodan Rios, DO    "

## 2017-09-11 NOTE — MR AVS SNAPSHOT
After Visit Summary   2017    Sandra Steve    MRN: [id number]           Patient Information     Date Of Birth          1985        Visit Information        Provider Department      2017 8:30 AM Hodan Rios DO [facility] Maternal [facility]etal [facility]        Today's Diagnoses     Suspected fetal anomaly, antepartum, not applicable or unspecified fetus    -  1    Abnormal ultrasonic finding on  screening of mother           Follow-ups after your visit        Your next 10 appointments already scheduled     Oct 05, 2017  9:30 AM CDT   [employer] US COMPRE SINGLE F/U with [id number]   [facility] Maternal Fetal Medicine Ultrasound - [facility])    [address]  [facility] [id number] [phone]           Wear comfortable clothes and leave your valuables at home.            Oct 05, 2017 10:00 AM CDT   Radiology MD with UR MFJHOAN MILLARD   [facility] Maternal [facility]etal Medicine - [facility])    [address]  [facility] [address]   [phone]           Please arrive at the time given for your first appointment. This visit is used internally to schedule the physician's time during your ultrasound.            Oct 06, 2017  3:00 PM CDT   ESTABLISHED PRENATAL with Chika Phillips MD   [facility]    [address] [facility] [address] [phone]              Future tests that were ordered for you today     Open Future Orders        Priority Expected Expires Ordered    [employer] US Comprehensive Single F/U Routine  2018            Who to contact     If you have questions or need follow up information about today's clinic visit or your schedule please contact [facility] directly at [phone].  Normal or non-critical lab and imaging results will be communicated to you by Hay, letter  "or phone within 4 business days after the clinic has received the results. If you do not hear from us within 7 days, please contact the clinic through FlockTAG or phone. If you have a critical or abnormal lab result, we will notify you by phone as soon as possible.  Submit refill requests through Flock[employer] or call your pharmacy and they will forward the refill request to us. Please allow 3 business days for your refill to be completed.          Additional Information About Your Visit        FlockTAG Information     [employer] lets you send messages to your doctor, view your test results, renew your prescriptions, schedule appointments and more. To sign up, go to [email or web address] . Click on \"Log in\" on the left side of the screen, which will take you to the Welcome page. Then click on \"Sign up Now\" on the right side of the page.     You will be asked to enter the access code listed below, as well as some personal information. Please follow the directions to create your username and password.     Your access code is: [email or web address]  Expires: 2017  3:55 PM     Your access code will  in 90 days. If you need help or a new code, please call your [place] clinic or [phone].        Care EveryWhere ID     This is your Care EveryWhere ID. This could be used by other organizations to access your [place] medical records  [id number]        Your Vitals Were     Last Period                   (LMP Unknown)            Blood Pressure from Last 3 Encounters:   17 111/64   17 111/64   07/10/17 102/62    Weight from Last 3 Encounters:   17 75.8 kg (167 lb)   17 73.8 kg (162 lb 12.8 oz)   07/10/17 74.3 kg (163 lb 12.8 oz)              We Performed the Following     [facility] Family Prep Screen     Non Invasive Prenatal Test Cell Free DNA        Primary Care Provider    None Specified       No primary provider on file.        Equal Access to Services     LISA FRASER AH: Jaime Bentley, " randall hatch, marleen sisnarendra middleton, kenia velascopolina ah. So [facility] [phone].    ATENCIÓN: Si raminla som, tiene a tejeda disposición servicios gratuitos de asistencia lingüística. Llame al [phone].    We comply with applicable federal civil rights laws and [place] laws. We do not discriminate on the basis of race, color, national origin, age, disability sex, sexual orientation or gender identity.            Thank you!     Thank you for choosing [facility]  for your care. Our goal is always to provide you with excellent care. Hearing back from our patients is one way we can continue to improve our services. Please take a few minutes to complete the written survey that you may receive in the mail after your visit with us. Thank you!             Your Updated Medication List - Protect others around you: Learn how to safely use, store and throw away your medicines at www.disposemymeds.org.          This list is accurate as of: 9/11/17 10:48 AM.  Always use your most recent med list.                   Brand Name Dispense Instructions for use Diagnosis    prenatal multivitamin plus iron 27-0.8 MG Tabs per tablet      Take 1 tablet by mouth daily

## 2017-09-11 NOTE — MR AVS SNAPSHOT
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            Oct 05, 2017 10:00 AM CDT   Radiology MD with UR M MD   [facility] Maternal Fetal Medicine - [facility])    [address]  [facility] [address]   [phone]           Please arrive at the time given for your first appointment. This visit is used internally to schedule the physician's time during your ultrasound.            Oct 06, 2017  3:00 PM CDT   ESTABLISHED PRENATAL with Chika Phillips MD   [facility])    [address] [facility] [address] [phone]              Future tests that were ordered for you today     Open Future Orders        Priority Expected Expires Ordered    [facility] US Comprehensive Single F/U Routine  2018            Who to contact     If you have questions or need follow up information about today's clinic visit or your schedule please contact [facility] MATERNAL FETAL MEDICINE [facility] directly at [phone].  Normal or non-critical lab and imaging results will be  "communicated to you by MyChart, letter or phone within 4 business days after the clinic has received the results. If you do not hear from us within 7 days, please contact the clinic through Vetiaryt or phone. If you have a critical or abnormal lab result, we will notify you by phone as soon as possible.  Submit refill requests through [employer] or call your pharmacy and they will forward the refill request to us. Please allow 3 business days for your refill to be completed.          Additional Information About Your Visit        [employer] Information     [employer] lets you send messages to your doctor, view your test results, renew your prescriptions, schedule appointments and more. To sign up, go to [email or web address] . Click on \"Log in\" on the left side of the screen, which will take you to the Welcome page. Then click on \"Sign up Now\" on the right side of the page.     You will be asked to enter the access code listed below, as well as some personal information. Please follow the directions to create your username and password.     Your access code is: [email or web address]  Expires: 2017  3:55 PM     Your access code will  in 90 days. If you need help or a new code, please call your [place] clinic or [phone].        Care EveryWhere ID     This is your Care EveryWhere ID. This could be used by other organizations to access your [place] medical records  [id number]        Your Vitals Were     Last Period                   (LMP Unknown)            Blood Pressure from Last 3 Encounters:   17 111/64   17 111/64   07/10/17 102/62    Weight from Last 3 Encounters:   17 75.8 kg (167 lb)   17 73.8 kg (162 lb 12.8 oz)   07/10/17 74.3 kg (163 lb 12.8 oz)              We Performed the Following     [facility] Genetic Counseling        Primary Care Provider    None Specified       No primary provider on file.        Equal Access to Services     LISA FRASER : Hadii randall [place] " marleen hatchmajose saezakosuaraegan garciacrystal hungmarie hernandez. So [facility] [phone].    ATENCIÓN: Si noe kearns, tiene a tejeda disposición servicios gratuitos de asistencia lingüística. Llame al [phone].    We comply with applicable federal civil rights laws and [place] laws. We do not discriminate on the basis of race, color, national origin, age, disability sex, sexual orientation or gender identity.            Thank you!     Thank you for choosing [facility]  for your care. Our goal is always to provide you with excellent care. Hearing back from our patients is one way we can continue to improve our services. Please take a few minutes to complete the written survey that you may receive in the mail after your visit with us. Thank you!             Your Updated Medication List - Protect others around you: Learn how to safely use, store and throw away your medicines at www.disposemymeds.org.          This list is accurate as of: 9/11/17 12:34 PM.  Always use your most recent med list.                   Brand Name Dispense Instructions for use Diagnosis    prenatal multivitamin plus iron 27-0.8 MG Tabs per tablet      Take 1 tablet by mouth daily

## 2017-09-12 ENCOUNTER — PRENATAL OFFICE VISIT (OUTPATIENT)
Dept: OBGYN | Facility: CLINIC | Age: 32
End: 2017-09-12
Payer: COMMERCIAL

## 2017-09-12 VITALS
SYSTOLIC BLOOD PRESSURE: 115 MMHG | WEIGHT: 168 LBS | HEART RATE: 79 BPM | DIASTOLIC BLOOD PRESSURE: 73 MMHG | BODY MASS INDEX: 29.77 KG/M2 | HEIGHT: 63 IN

## 2017-09-12 DIAGNOSIS — Z34.82 PRENATAL CARE, SUBSEQUENT PREGNANCY, SECOND TRIMESTER: Primary | ICD-10-CM

## 2017-09-12 PROBLEM — O35.03X0 CHOROID PLEXUS CYST OF FETUS AFFECTING CARE OF MOTHER, ANTEPARTUM, NOT APPLICABLE OR UNSPECIFIED FETUS: Status: RESOLVED | Noted: 2017-09-07 | Resolved: 2017-09-12

## 2017-09-12 PROCEDURE — 99207 ZZC PRENATAL VISIT: CPT | Performed by: OBSTETRICS & GYNECOLOGY

## 2017-09-12 NOTE — MR AVS SNAPSHOT
After Visit Summary   9/12/2017    Sandra Steve    MRN: [id number]           Patient Information     Date Of Birth          1985        Visit Information        Provider Department      9/12/2017 4:15 PM Chika Phillips MD [facility]        Today's Diagnoses     Prenatal care, subsequent pregnancy, second trimester    -  1       Follow-ups after your visit        Your next 10 appointments already scheduled     Oct 05, 2017  9:30 AM CDT   MFM US COMPRE SINGLE F/U with [id number]   [facility] Maternal Fetal Medicine Ultrasound - [facility])    [address]  [facility] [place] [address] [phone]           Wear comfortable clothes and leave your valuables at home.            Oct 05, 2017 10:00 AM CDT   Radiology MD with UR KRISTEN MILLARD   [facility] - [facility])    [address]  [facility] [address]   [phone]           Please arrive at the time given for your first appointment. This visit is used internally to schedule the physician's time during your ultrasound.            Oct 06, 2017  2:45 PM CDT   LAB with [facility] ([facility])    [address] [facility] [address] [phone]           Patient must bring picture ID. Patient should be prepared to give a urine specimen  Please do not eat 10-12 hours before your appointment if you are coming in fasting for labs on lipids, cholesterol, or glucose (sugar). Pregnant women should follow their Care Team instructions. Water with medications is okay. Do not drink coffee or other fluids. If you have concerns about taking  your medications, please ask at office or if scheduling via MyEdu, send a message by clicking on Secure Messaging, Message Your Care Team.            Oct 06, 2017  3:00 PM CDT   ESTABLISHED PRENATAL with Chika Phillips MD   [facility] ([facility])    [address]  [facility] [address]   [phone]              Future tests that were ordered for you today     Open Future Orders        Priority Expected Expires Ordered    [facility] US Comprehensive Single F/U Routine  2018            Who to contact     If you have questions or need follow up information about today's clinic visit or your schedule please contact [facility] directly at [phone].  Normal or non-critical lab and imaging results will be communicated to you by TeamLease Serviceshart, letter or phone within 4 business days after the clinic has received the results. If you do not hear from us within 7 days, please contact the clinic through [employer] or phone. If you have a critical or abnormal lab result, we will notify you by phone as soon as possible.  Submit refill requests through [employer] or call your pharmacy and they will forward the refill request to us. Please allow 3 business days for your refill to be completed.          Additional Information About Your Visit        [employer] Information     [employer] lets you send messages to your doctor, view your test results, renew your prescriptions, schedule appointments and more. To sign up, go to [email or web address] . Click on \"Log in\" on the left side of the screen, which will take you to the Welcome page. Then click on \"Sign up Now\" on the right side of the page.     You will be asked to enter the access code listed below, as well as some personal information. Please follow the directions to create your username and password.     Your access code is: [email or web address]  Expires: 2017  3:55 PM     Your access code will  in 90 days. If you need help or a new code, please call your [place] clinic or [phone].        Care EveryWhere ID     This is your Care EveryWhere ID. This could be used by other organizations to access your [place] medical records  [id number]      " "  Your Vitals Were     Pulse Height Last Period BMI (Body Mass Index)          79 5' 3\" (1.6 m) (LMP Unknown) 29.76 kg/m2         Blood Pressure from Last 3 Encounters:   09/12/17 115/73   09/06/17 111/64   08/14/17 111/64    Weight from Last 3 Encounters:   09/12/17 168 lb (76.2 kg)   09/06/17 167 lb (75.8 kg)   08/14/17 162 lb 12.8 oz (73.8 kg)              Today, you had the following     No orders found for display       Primary Care Provider    None Specified       No primary provider on file.        Equal Access to Services     MOMO [facility] : Hadaxel Bentley, randall hatch, marleen middleton, kenia tapia . So [facility] [phone].    ATENCIÓN: Si habla español, tiene a tejeda disposición servicios gratuitos de asistencia lingüística. Llame al [phone].    We comply with applicable federal civil rights laws and [place] laws. We do not discriminate on the basis of race, color, national origin, age, disability sex, sexual orientation or gender identity.            Thank you!     Thank you for choosing [facility]  for your care. Our goal is always to provide you with excellent care. Hearing back from our patients is one way we can continue to improve our services. Please take a few minutes to complete the written survey that you may receive in the mail after your visit with us. Thank you!             Your Updated Medication List - Protect others around you: Learn how to safely use, store and throw away your medicines at www.disposemymeds.org.          This list is accurate as of: 9/12/17  5:27 PM.  Always use your most recent med list.                   Brand Name Dispense Instructions for use Diagnosis    prenatal multivitamin plus iron 27-0.8 MG Tabs per tablet      Take 1 tablet by mouth daily          "

## 2017-09-12 NOTE — NURSING NOTE
"Initial /73 (BP Location: Right arm, Patient Position: Chair, Cuff Size: Adult Small)  Pulse 79  Ht 5' 3\" (1.6 m)  Wt 168 lb (76.2 kg)  LMP  (LMP Unknown)  BMI 29.76 kg/m2 Estimated body mass index is 29.76 kg/(m^2) as calculated from the following:    Height as of this encounter: 5' 3\" (1.6 m).    Weight as of this encounter: 168 lb (76.2 kg). .      "

## 2017-09-12 NOTE — PROGRESS NOTES
"CC: Here for routine prenatal visit @ 21w4d   HPI:  Decreased FM  No complaints.     PE: /73 (BP Location: Right arm, Patient Position: Chair, Cuff Size: Adult Small)  Pulse 79  Ht 5' 3\" (1.6 m)  Wt 168 lb (76.2 kg)  LMP  (LMP Unknown)  BMI 29.76 kg/m2   See OB flowsheet    Bedside U/S shows fetal movement and grossly normal fluid,  's    A/P  @ 21w4d normal pregnancy    1. Routine prenatal care.  Level 2 u/S with concerns about possible aneuploidy--labs tests pending.  Anterior placenta noted, which may account for diminished fetal movement.  Patient reassured today.    RTC as scheduled      Chika Phillips M.D.    "

## 2017-09-15 ENCOUNTER — TELEPHONE (OUTPATIENT)
Dept: MATERNAL FETAL MEDICINE | Facility: CLINIC | Age: 32
End: 2017-09-15

## 2017-09-15 NOTE — TELEPHONE ENCOUNTER
Per prior arrangement, I left a message for Sandra explaining that her NIPT (Innatal) results are back and are normal / negative.  This means that there is a very low likelihood for the baby to have trisomy 21, trisomy 18, trisomy 13, or sex chromosome aneuploidy.  I explained that these results are very reassuring, though not definitive, and amniocentesis is not indicated based on this result but remains an option.  I did not specifically state the fetal gender in the voicemail, but I explained that the fetal gender per the blood test matches up with the gender that they were told by the level II ultrasound (male).      I explained that the SMA carrier test results are still pending, and that these results can take about two weeks to return.  We will call her again when those results are available.  I encouraged Sandra to call me directly if she has questions.      Michelle Burris MS, [facility]  Certified Genetic Counselor  September 15, 2017  11:09 AM

## 2017-09-17 LAB — LAB SCANNED RESULT: NORMAL

## 2017-09-21 ENCOUNTER — TELEPHONE (OUTPATIENT)
Dept: MATERNAL FETAL MEDICINE | Facility: CLINIC | Age: 32
End: 2017-09-21

## 2017-09-21 DIAGNOSIS — Z98.891 H/O: C-SECTION: ICD-10-CM

## 2017-09-21 DIAGNOSIS — O35.HXX0: ICD-10-CM

## 2017-09-21 NOTE — TELEPHONE ENCOUNTER
9/21/2017    Called Sandra to discuss the results from her and her 's carrier testing for Spinal Muscular Atrophy.  Results were negative for both individuals.  This greatly reduces the risks for the current pregnancy to be affected with SMA.  The residual risk is <1/1,000,000.  Her prior NIPT testing also came back negative, (see prior phone notes) and these results were briefly reviewed with Sandra.  While we do not have an explanation for the bilateral club feet identified in her current pregnancy, we do not currently have any more genetic testing that we would offer at this time.  Sandra has a follow up ultrasound scheduled for 10/5, and we will be available to discuss any questions she has at that time.  All of this was discussed with Sandra and her questions were answered to her satisfaction.      Sandra confirmed that she has contact numbers for the clinic and myself, and was encouraged to reach out if she has any questions or concerns.     Remberto Toney MS, [facility]  Certified Genetic Counselor  Phone: [phone]  Pager: [phone]

## 2017-09-24 LAB — LAB SCANNED RESULT: NORMAL

## 2017-10-05 ENCOUNTER — HOSPITAL ENCOUNTER (OUTPATIENT)
Dept: ULTRASOUND IMAGING | Facility: CLINIC | Age: 32
Discharge: HOME | End: 2017-10-05
Attending: OBSTETRICS & GYNECOLOGY | Admitting: OBSTETRICS & GYNECOLOGY
Payer: COMMERCIAL

## 2017-10-05 ENCOUNTER — OFFICE VISIT (OUTPATIENT)
Dept: MATERNAL FETAL MEDICINE | Facility: CLINIC | Age: 32
End: 2017-10-05
Attending: OBSTETRICS & GYNECOLOGY
Payer: COMMERCIAL

## 2017-10-05 DIAGNOSIS — O35.9XX0 SUSPECTED FETAL ANOMALY, ANTEPARTUM, NOT APPLICABLE OR UNSPECIFIED FETUS: ICD-10-CM

## 2017-10-05 DIAGNOSIS — Z98.891 H/O: C-SECTION: ICD-10-CM

## 2017-10-05 DIAGNOSIS — O35.HXX0 CLUB FOOT OF FETUS AFFECTING ANTEPARTUM CARE OF MOTHER, SINGLE OR UNSPECIFIED FETUS: ICD-10-CM

## 2017-10-05 DIAGNOSIS — O35.9XX0 SUSPECTED FETAL ANOMALY, ANTEPARTUM, SINGLE OR UNSPECIFIED FETUS: Primary | ICD-10-CM

## 2017-10-05 PROCEDURE — 76816 OB US FOLLOW-UP PER FETUS: CPT

## 2017-10-05 NOTE — PROGRESS NOTES
Please refer to ultrasound report under 'Imaging' Studies of 'Chart Review' tabs.    Luis Cárdenas M.D.

## 2017-10-05 NOTE — MR AVS SNAPSHOT
After Visit Summary   10/5/2017    Sandra Steve    MRN: [id number]           Patient Information     Date Of Birth          1985        Visit Information        Provider Department      10/5/2017 10:00 AM Luis Cárdenas MD [facility] Maternal Fetal Medicine - [place]        Today's Diagnoses     Suspected fetal anomaly, antepartum, single or unspecified fetus    -  1    H/O:         Club foot of fetus affecting antepartum care of mother, single or unspecified fetus           Follow-ups after your visit        Additional Services     [facility] Genetic Counseling       MRI at 9:30                  Your next 10 appointments already scheduled     Oct 06, 2017  2:45 PM CDT   LAB with [facility] ([facility])    [address] [facility] [address]   [phone]           Patient must bring picture ID. Patient should be prepared to give a urine specimen  Please do not eat 10-12 hours before your appointment if you are coming in fasting for labs on lipids, cholesterol, or glucose (sugar). Pregnant women should follow their Care Team instructions. Water with medications is okay. Do not drink coffee or other fluids. If you have concerns about taking  your medications, please ask at office or if scheduling via [employer], send a message by clicking on Secure Messaging, Message Your Care Team.            Oct 06, 2017  3:00 PM CDT   ESTABLISHED PRENATAL with Chika Phillips MD   [facility]    [address] [place] [address]   [phone]            Oct 17, 2017  9:30 AM CDT   MR FETAL with URMR2   [facility], MRI ([facility])    [address] [place] [address] [phone]           Take your medicines as usual, unless your doctor tells you not to. Bring a list of your current medicines to your exam (including vitamins, minerals and  over-the-counter drugs).  No caffeine for 4 hours prior to exam.  The MRI machine uses a strong magnet. Please wear clothes without metal (snaps, zippers). A sweatsuit works well, or we may give you a hospital gown.  Please remove any body piercings and hair extensions before you arrive. You will also remove watches, jewelry, hairpins, wallets, dentures, partial dental plates and hearing aids. You may wear contact lenses, and you may be able to wear your rings. We have a safe place to keep your personal items, but it is safer to leave them at home.  IF YOU WILL RECEIVE SEDATION (take medicine to help you relax during your exam):   You must get the medicine from your doctor before you arrive. Bring the medicine to the exam. Do not take it at home.   Arrive one hour early. Bring someone who can take you home after the test. Your medicine will make you sleepy. After the exam, you may not drive, take a bus or take a taxi by yourself.   No eating 8 hours before your exam. You may have clear liquids up until 4 hours before your exam. (Clear liquids include water, clear tea, black coffee and fruit juice without pulp.)   **IMPORTANT** THE INSTRUCTIONS BELOW ARE ONLY FOR THOSE PATIENTS WHO HAVE BEEN TOLD THEY WILL RECEIVE SEDATION OR GENERAL ANESTHESIA DURING THEIR MRI PROCEDURE:  IF YOU WILL RECEIVE ANESTHESIA (be asleep for your exam):   Arrive 1 1/2 hours early. Bring someone who can take you home after the test. You may not drive, take a bus or take a taxi by yourself.   No eating 8 hours before your exam. You may have clear liquids up until 4 hours before your exam. (Clear liquids include water, clear tea, black coffee and fruit juice without pulp.)  If you have any questions, please contact your Imaging Department exam site.            Oct 17, 2017 10:30 AM CDT   Genetic Counseling with UR GEN COUNSELOR 2   [facility] Maternal Fetal Medicine [facility] ([facility])    [address]  [facility] [address]   [phone]            Oct 17, 2017 11:00 AM CDT   [facility] US COMPRE SINGLE F/U with URMFMUSR3   [facility] Maternal Fetal Medicine Ultrasound - [place] ([facility])    [address]  [facility] [address]   [phone]           Wear comfortable clothes and leave your valuables at home.            Oct 17, 2017 11:30 AM CDT   Radiology MD with UR MFJHOAN MILLARD   [facility] Maternal Fetal Medicine - [facility])    [address]  [facility] [address]   [phone]           Please arrive at the time given for your first appointment. This visit is used internally to schedule the physician's time during your ultrasound.              Future tests that were ordered for you today     Open Future Orders        Priority Expected Expires Ordered    [facility] Genetic Counseling Routine 10/17/2017 10/17/2018 10/5/2017    [facility] US Comprehensive Single F/U Routine 10/26/2017 10/5/2018 10/5/2017    MR Fetal Routine 10/19/2017 10/5/2018 10/5/2017            Who to contact     If you have questions or need follow up information about today's clinic visit or your schedule please contact [facility] MATERNAL FETAL MEDICINE [facility] directly at [phone].  Normal or non-critical lab and imaging results will be communicated to you by Udorsehart, letter or phone within 4 business days after the clinic has received the results. If you do not hear from us within 7 days, please contact the clinic through BitGravityt or phone. If you have a critical or abnormal lab result, we will notify you by phone as soon as possible.  Submit refill requests through Tagorize or call your pharmacy and they will forward the refill request to us. Please allow 3 business days for your refill to be completed.          Additional Information About Your Visit        Tagorize Information     [employer] lets you send messages to your doctor, view your test results,  "renew your prescriptions, schedule appointments and more. To sign up, go to [email or web address] . Click on \"Log in\" on the left side of the screen, which will take you to the Welcome page. Then click on \"Sign up Now\" on the right side of the page.     You will be asked to enter the access code listed below, as well as some personal information. Please follow the directions to create your username and password.     Your access code is: [email or web address]  Expires: 2017  3:55 PM     Your access code will  in 90 days. If you need help or a new code, please call your [place] clinic or [phone].        Care EveryWhere ID     This is your Care EveryWhere ID. This could be used by other organizations to access your [place] medical records  [id number]        Your Vitals Were     Last Period                   (LMP Unknown)            Blood Pressure from Last 3 Encounters:   17 115/73   17 111/64   17 111/64    Weight from Last 3 Encounters:   17 76.2 kg (168 lb)   17 75.8 kg (167 lb)   17 73.8 kg (162 lb 12.8 oz)               Primary Care Provider    None Specified       No primary provider on file.        Equal Access to Services     LISA FRASER : Jaime perry Soliam, waaxda luqadaha, qaybta kaalmada adelatrice, kenia tapia . So [facility] [phone].    ATENCIÓN: Si habla español, tiene a tejeda disposición servicios gratuitos de asistencia lingüística. Llame al [phone].    We comply with applicable federal civil rights laws and [place] laws. We do not discriminate on the basis of race, color, national origin, age, disability, sex, sexual orientation, or gender identity.            Thank you!     Thank you for choosing [facility]  for your care. Our goal is always to provide you with excellent care. Hearing back from our patients is one way we can continue to improve our services. Please take a few " minutes to complete the written survey that you may receive in the mail after your visit with us. Thank you!             Your Updated Medication List - Protect others around you: Learn how to safely use, store and throw away your medicines at www.disposemymeds.org.          This list is accurate as of: 10/5/17  1:18 PM.  Always use your most recent med list.                   Brand Name Dispense Instructions for use Diagnosis    prenatal multivitamin plus iron 27-0.8 MG Tabs per tablet      Take 1 tablet by mouth daily

## 2017-10-05 NOTE — NURSING NOTE
D: Patient set up with MRI on 10/17 at 9:30. Instructions and map given to patient and . She will return to our clinic after MRI for GC/RL2 and possible amniocentesis.  Judi Brady RN

## 2017-10-06 ENCOUNTER — PRENATAL OFFICE VISIT (OUTPATIENT)
Dept: OBGYN | Facility: CLINIC | Age: 32
End: 2017-10-06
Payer: COMMERCIAL

## 2017-10-06 VITALS
HEART RATE: 71 BPM | DIASTOLIC BLOOD PRESSURE: 75 MMHG | BODY MASS INDEX: 31.04 KG/M2 | SYSTOLIC BLOOD PRESSURE: 112 MMHG | WEIGHT: 175.2 LBS | HEIGHT: 63 IN

## 2017-10-06 DIAGNOSIS — Z34.82 PRENATAL CARE, SUBSEQUENT PREGNANCY IN SECOND TRIMESTER: Primary | ICD-10-CM

## 2017-10-06 DIAGNOSIS — Z3A.20 20 WEEKS GESTATION OF PREGNANCY: ICD-10-CM

## 2017-10-06 DIAGNOSIS — Z23 NEED FOR PROPHYLACTIC VACCINATION AND INOCULATION AGAINST INFLUENZA: ICD-10-CM

## 2017-10-06 LAB
GLUCOSE 1H P 50 G GLC PO SERPL-MCNC: 90 MG/DL (ref 60–129)
HGB BLD-MCNC: 12.6 G/DL (ref 11.7–15.7)

## 2017-10-06 PROCEDURE — 90686 IIV4 VACC NO PRSV 0.5 ML IM: CPT | Performed by: OBSTETRICS & GYNECOLOGY

## 2017-10-06 PROCEDURE — 86780 TREPONEMA PALLIDUM: CPT | Performed by: OBSTETRICS & GYNECOLOGY

## 2017-10-06 PROCEDURE — 99207 ZZC PRENATAL VISIT: CPT | Performed by: OBSTETRICS & GYNECOLOGY

## 2017-10-06 PROCEDURE — 00000218 ZZHCL STATISTIC OBHBG - HEMOGLOBIN: Performed by: OBSTETRICS & GYNECOLOGY

## 2017-10-06 PROCEDURE — 82950 GLUCOSE TEST: CPT | Performed by: OBSTETRICS & GYNECOLOGY

## 2017-10-06 PROCEDURE — 36415 COLL VENOUS BLD VENIPUNCTURE: CPT | Performed by: OBSTETRICS & GYNECOLOGY

## 2017-10-06 PROCEDURE — 90471 IMMUNIZATION ADMIN: CPT | Performed by: OBSTETRICS & GYNECOLOGY

## 2017-10-06 NOTE — MR AVS SNAPSHOT
After Visit Summary   10/6/2017    Sandra Steve    MRN: [id number]           Patient Information     Date Of Birth          1985        Visit Information        Provider Department      10/6/2017 3:00 PM Chika Phillips MD [facility]        Today's Diagnoses     Prenatal care, subsequent pregnancy in second trimester    -  1    Need for prophylactic vaccination and inoculation against influenza           Follow-ups after your visit        Your next 10 appointments already scheduled     Oct 17, 2017  9:30 AM CDT   MR FETAL with URMR2   [facility] [place], MRI ([facility])    [facility] [address] [phone]           Take your medicines as usual, unless your doctor tells you not to. Bring a list of your current medicines to your exam (including vitamins, minerals and over-the-counter drugs).  No caffeine for 4 hours prior to exam.  The MRI machine uses a strong magnet. Please wear clothes without metal (snaps, zippers). A sweatsuit works well, or we may give you a hospital gown.  Please remove any body piercings and hair extensions before you arrive. You will also remove watches, jewelry, hairpins, wallets, dentures, partial dental plates and hearing aids. You may wear contact lenses, and you may be able to wear your rings. We have a safe place to keep your personal items, but it is safer to leave them at home.  IF YOU WILL RECEIVE SEDATION (take medicine to help you relax during your exam):   You must get the medicine from your doctor before you arrive. Bring the medicine to the exam. Do not take it at home.   Arrive one hour early. Bring someone who can take you home after the test. Your medicine will make you sleepy. After the exam, you may not drive, take a bus or take a taxi by yourself.   No eating 8 hours before your exam. You may have clear liquids up until 4 hours before your exam. (Clear liquids  include water, clear tea, black coffee and fruit juice without pulp.)   **IMPORTANT** THE INSTRUCTIONS BELOW ARE ONLY FOR THOSE PATIENTS WHO HAVE BEEN TOLD THEY WILL RECEIVE SEDATION OR GENERAL ANESTHESIA DURING THEIR MRI PROCEDURE:  IF YOU WILL RECEIVE ANESTHESIA (be asleep for your exam):   Arrive 1 1/2 hours early. Bring someone who can take you home after the test. You may not drive, take a bus or take a taxi by yourself.   No eating 8 hours before your exam. You may have clear liquids up until 4 hours before your exam. (Clear liquids include water, clear tea, black coffee and fruit juice without pulp.)  If you have any questions, please contact your Imaging Department exam site.            Oct 17, 2017 10:30 AM CDT   Genetic Counseling with ALEX GEN COUNSELOR 2   [facility] Maternal Fetal Medicine - [facility])    [address]  [facility] [address]   [phone]            Oct 17, 2017 11:00 AM CDT   [facility] US COMPRE SINGLE F/U with [id number]   [facility] Maternal Fetal Medicine Ultrasound - [facility])    [address]  [facility] [place] [facility] [address]   [phone]           Wear comfortable clothes and leave your valuables at home.            Oct 17, 2017 11:30 AM CDT   Radiology MD with ALEX PETERSON MD   [facility] Maternal Fetal Medicine - [facility])    [address]  [facility] [address]   [phone]           Please arrive at the time given for your first appointment. This visit is used internally to schedule the physician's time during your ultrasound.              Future tests that were ordered for you today     Open Future Orders        Priority Expected Expires Ordered    [facility] Genetic Counseling Routine 10/17/2017 10/17/2018 10/5/2017    MF US Comprehensive Single F/U Routine 10/26/2017 10/5/2018 10/5/2017    MR Fetal Routine 10/19/2017 10/5/2018 10/5/2017        "     Who to contact     If you have questions or need follow up information about today's clinic visit or your schedule please contact [facility] directly at [phone].  Normal or non-critical lab and imaging results will be communicated to you by MyChart, letter or phone within 4 business days after the clinic has received the results. If you do not hear from us within 7 days, please contact the clinic through MyChart or phone. If you have a critical or abnormal lab result, we will notify you by phone as soon as possible.  Submit refill requests through [employer] or call your pharmacy and they will forward the refill request to us. Please allow 3 business days for your refill to be completed.          Additional Information About Your Visit        LocalOnharLittle1 Information     [employer] lets you send messages to your doctor, view your test results, renew your prescriptions, schedule appointments and more. To sign up, go to [email or web address] . Click on \"Log in\" on the left side of the screen, which will take you to the Welcome page. Then click on \"Sign up Now\" on the right side of the page.     You will be asked to enter the access code listed below, as well as some personal information. Please follow the directions to create your username and password.     Your access code is: [email or web address]  Expires: 2017  3:55 PM     Your access code will  in 90 days. If you need help or a new code, please call your [place] clinic or [phone].        Care EveryWhere ID     This is your Care EveryWhere ID. This could be used by other organizations to access your [place] medical records  [id number]        Your Vitals Were     Pulse Height Last Period Breastfeeding? BMI (Body Mass Index)       71 5' 3\" (1.6 m) (LMP Unknown) No 31.04 kg/m2        Blood Pressure from Last 3 Encounters:   10/06/17 112/75   17 115/73   17 111/64    Weight from Last 3 Encounters:   10/06/17 175 lb 3.2 oz (79.5 " kg)   09/12/17 168 lb (76.2 kg)   09/06/17 167 lb (75.8 kg)              We Performed the Following     FLU VAC, SPLIT VIRUS IM > 3 YO (QUADRIVALENT) [99486]     Vaccine Administration, Initial [33909]        Primary Care Provider    None Specified       No primary provider on file.        Equal Access to Services     LISA FRASER : Hadii gayle perry Soliam, waaxda luqadaha, qaybta kaalmada emi, kenia hernandez. So [facility] [phone].    ATENCIÓN: Si habla español, tiene a tejeda disposición servicios gratuitos de asistencia lingüística. Llame al [phone].    We comply with applicable federal civil rights laws and [place] laws. We do not discriminate on the basis of race, color, national origin, age, disability, sex, sexual orientation, or gender identity.            Thank you!     Thank you for choosing [facility]  for your care. Our goal is always to provide you with excellent care. Hearing back from our patients is one way we can continue to improve our services. Please take a few minutes to complete the written survey that you may receive in the mail after your visit with us. Thank you!             Your Updated Medication List - Protect others around you: Learn how to safely use, store and throw away your medicines at www.disposemymeds.org.          This list is accurate as of: 10/6/17  3:38 PM.  Always use your most recent med list.                   Brand Name Dispense Instructions for use Diagnosis    prenatal multivitamin plus iron 27-0.8 MG Tabs per tablet      Take 1 tablet by mouth daily

## 2017-10-06 NOTE — PROGRESS NOTES
Injectable Influenza Immunization Documentation    1.  Is the person to be vaccinated sick today?   No    2. Does the person to be vaccinated have an allergy to a component   of the vaccine?   No    3. Has the person to be vaccinated ever had a serious reaction   to influenza vaccine in the past?   No    4. Has the person to be vaccinated ever had Guillain-Barré syndrome?   No    Form completed by Shonna Khan CMA

## 2017-10-06 NOTE — PROGRESS NOTES
"CC: Here for routine prenatal visit @ 25w0d   HPI: + FM, no ctx, no LOF, no VB.  No complaints.     PE: /75 (BP Location: Right arm, Patient Position: Chair, Cuff Size: Adult Regular)  Pulse 71  Ht 5' 3\" (1.6 m)  Wt 175 lb 3.2 oz (79.5 kg)  LMP  (LMP Unknown)  Breastfeeding? No  BMI 31.04 kg/m2   See OB flowsheet    GTT in progress    Repeat Level 2 with additional concerns about brain anatomy and size--recommended to have fetal MRI.  Genetic counseling pending as well.    A/P  @ 25w0d normal pregnancy    1. Routine prenatal care.  Flu shot today.  Patient comforted regarding the uncertainty of her baby's diagnosis.  Discussed likely need for delivery downtown, especially if there is continued concerns about the brain    RTC 4 weeks.      Chika Phillips M.D.    "

## 2017-10-06 NOTE — NURSING NOTE
"Chief Complaint   Patient presents with     Prenatal Care       Initial /75 (BP Location: Right arm, Patient Position: Chair, Cuff Size: Adult Regular)  Pulse 71  Ht 5' 3\" (1.6 m)  Wt 175 lb 3.2 oz (79.5 kg)  LMP  (LMP Unknown)  Breastfeeding? No  BMI 31.04 kg/m2 Estimated body mass index is 31.04 kg/(m^2) as calculated from the following:    Height as of this encounter: 5' 3\" (1.6 m).    Weight as of this encounter: 175 lb 3.2 oz (79.5 kg).  Medication Reconciliation: complete   Shonna Khan CMA      "

## 2017-10-07 LAB — T PALLIDUM IGG+IGM SER QL: NEGATIVE

## 2017-10-11 DIAGNOSIS — Z34.82 PRENATAL CARE, SUBSEQUENT PREGNANCY IN SECOND TRIMESTER: Primary | ICD-10-CM

## 2017-10-11 RX ORDER — ONDANSETRON 4 MG/1
4-8 TABLET, ORALLY DISINTEGRATING ORAL EVERY 6 HOURS PRN
Qty: 20 TABLET | Refills: 1 | Status: SHIPPED | OUTPATIENT
Start: 2017-10-11 | End: 2018-01-04

## 2017-10-17 ENCOUNTER — HOSPITAL ENCOUNTER (OUTPATIENT)
Dept: ULTRASOUND IMAGING | Facility: CLINIC | Age: 32
End: 2017-10-17
Attending: OBSTETRICS & GYNECOLOGY
Payer: COMMERCIAL

## 2017-10-17 ENCOUNTER — OFFICE VISIT (OUTPATIENT)
Dept: MATERNAL FETAL MEDICINE | Facility: CLINIC | Age: 32
End: 2017-10-17
Attending: OBSTETRICS & GYNECOLOGY
Payer: COMMERCIAL

## 2017-10-17 ENCOUNTER — HOSPITAL ENCOUNTER (OUTPATIENT)
Dept: MRI IMAGING | Facility: CLINIC | Age: 32
Discharge: HOME | End: 2017-10-17
Attending: OBSTETRICS & GYNECOLOGY | Admitting: OBSTETRICS & GYNECOLOGY
Payer: COMMERCIAL

## 2017-10-17 DIAGNOSIS — O35.09X0: ICD-10-CM

## 2017-10-17 DIAGNOSIS — O28.3 ABNORMAL ULTRASONIC FINDING ON ANTENATAL SCREENING OF MOTHER: Primary | ICD-10-CM

## 2017-10-17 DIAGNOSIS — O35.9XX0 SUSPECTED FETAL ANOMALY, ANTEPARTUM, SINGLE OR UNSPECIFIED FETUS: ICD-10-CM

## 2017-10-17 DIAGNOSIS — O35.HXX0 CLUB FOOT OF FETUS AFFECTING ANTEPARTUM CARE OF MOTHER, SINGLE OR UNSPECIFIED FETUS: ICD-10-CM

## 2017-10-17 DIAGNOSIS — O35.HXX0 CLUB FOOT OF FETUS AFFECTING ANTEPARTUM CARE OF MOTHER, NOT APPLICABLE OR UNSPECIFIED FETUS: Primary | ICD-10-CM

## 2017-10-17 DIAGNOSIS — Z98.891 H/O: C-SECTION: ICD-10-CM

## 2017-10-17 PROCEDURE — 96040 ZZH GENETIC COUNSELING, EACH 30 MINUTES: CPT | Mod: ZF | Performed by: GENETIC COUNSELOR, MS

## 2017-10-17 PROCEDURE — 76816 OB US FOLLOW-UP PER FETUS: CPT

## 2017-10-17 PROCEDURE — 72195 MRI PELVIS W/O DYE: CPT

## 2017-10-17 PROCEDURE — 59000 AMNIOCENTESIS DIAGNOSTIC: CPT | Performed by: OBSTETRICS & GYNECOLOGY

## 2017-10-17 NOTE — MR AVS SNAPSHOT
After Visit Summary   10/17/2017    Sandra Steve    MRN: [id number]           Patient Information     Date Of Birth          1985        Visit Information        Provider Department      10/17/2017 11:30 AM Hodan Rios DO [facility] Maternal Fetal Medicine [facility]        Today's Diagnoses     Club foot of fetus affecting antepartum care of mother, not applicable or unspecified fetus    -  1    Fetal macrocephaly affecting antepartum care of mother, not applicable or unspecified fetus           Follow-ups after your visit        Your next 10 appointments already scheduled     Nov 02, 2017  9:30 AM CDT   MFM US COMPRE SINGLE F/U with [id number]   [facility] Maternal Fetal Medicine Ultrasound - [place] ([facility])    [address]  [facility] [id number] [phone]           Wear comfortable clothes and leave your valuables at home.            Nov 02, 2017 10:00 AM CDT   Radiology MD with UR M MD   [facility] Maternal Fetal Medicine - [facility])    [address]  [facility] [address]   [phone]           Please arrive at the time given for your first appointment. This visit is used internally to schedule the physician's time during your ultrasound.              Future tests that were ordered for you today     Open Future Orders        Priority Expected Expires Ordered    MFM US Comprehensive Single F/U Routine 10/31/2017 10/17/2018 10/17/2017    AFP Amniotic Reflex to Acetylchol STAT  1/15/2018 10/17/2017    CHROMOSOME AMNIOTIC FLUID With Professional Interpretation STAT  1/15/2018 10/17/2017    Cytogenomic Microarray SNP Fetal Routine  1/15/2018 10/17/2017            Who to contact     If you have questions or need follow up information about today's clinic visit or your schedule please contact [facility]ERNAL FETAL [facility] directly at [phone].  Normal or  "non-critical lab and imaging results will be communicated to you by MyChart, letter or phone within 4 business days after the clinic has received the results. If you do not hear from us within 7 days, please contact the clinic through Visualtisingt or phone. If you have a critical or abnormal lab result, we will notify you by phone as soon as possible.  Submit refill requests through [employer] or call your pharmacy and they will forward the refill request to us. Please allow 3 business days for your refill to be completed.          Additional Information About Your Visit        FlowonixCharlotte Hungerford HospitalAppnique Information     [employer] lets you send messages to your doctor, view your test results, renew your prescriptions, schedule appointments and more. To sign up, go to [email or web address] . Click on \"Log in\" on the left side of the screen, which will take you to the Welcome page. Then click on \"Sign up Now\" on the right side of the page.     You will be asked to enter the access code listed below, as well as some personal information. Please follow the directions to create your username and password.     Your access code is: [email or web address]  Expires: 2017  3:55 PM     Your access code will  in 90 days. If you need help or a new code, please call your [place] clinic or [phone].        Care EveryWhere ID     This is your Care EveryWhere ID. This could be used by other organizations to access your [place] medical records  [id number]        Your Vitals Were     Last Period                   (LMP Unknown)            Blood Pressure from Last 3 Encounters:   10/06/17 112/75   17 115/73   17 111/64    Weight from Last 3 Encounters:   10/06/17 79.5 kg (175 lb 3.2 oz)   17 76.2 kg (168 lb)   17 75.8 kg (167 lb)               Primary Care Provider    None Specified       No primary provider on file.        Equal Access to Services     LISA FRSAER : Jaime Bentley, randall hatch, marleen garcia " kenia middletongeena ernandezaapolina ah. Danyell [facility] [phone].    ATENCIÓN: Si habla som, tiene a tejeda disposición servicios gratuitos de asistencia lingüística. Rock al [phone].    We comply with applicable federal civil rights laws and [place] laws. We do not discriminate on the basis of race, color, national origin, age, disability, sex, sexual orientation, or gender identity.            Thank you!     Thank you for choosing [facility]  for your care. Our goal is always to provide you with excellent care. Hearing back from our patients is one way we can continue to improve our services. Please take a few minutes to complete the written survey that you may receive in the mail after your visit with us. Thank you!             Your Updated Medication List - Protect others around you: Learn how to safely use, store and throw away your medicines at www.disposemymeds.org.          This list is accurate as of: 10/17/17  2:40 PM.  Always use your most recent med list.                   Brand Name Dispense Instructions for use Diagnosis    ondansetron 4 MG ODT tab    ZOFRAN ODT    20 tablet    Take 1-2 tablets (4-8 mg) by mouth every 6 hours as needed for nausea    Prenatal care, subsequent pregnancy in second trimester       prenatal multivitamin plus iron 27-0.8 MG Tabs per tablet      Take 1 tablet by mouth daily

## 2017-10-17 NOTE — MR AVS SNAPSHOT
After Visit Summary   10/17/2017    Sandra Steve    MRN: [id number]           Patient Information     Date Of Birth          1985        Visit Information        Provider Department      10/17/2017 10:30 AM Halle Recio GC [facility] Maternal Fetal Medicine - [place]        Today's Diagnoses     Abnormal ultrasonic finding on  screening of mother    -  1    Suspected fetal anomaly, antepartum, single or unspecified fetus        Club foot of fetus affecting antepartum care of mother, single or unspecified fetus        H/O:            Follow-ups after your visit        Your next 10 appointments already scheduled     2017  9:30 AM CDT   MFM US COMPRE SINGLE F/U with [id number]   [facility] Maternal Fetal Medicine Ultrasound - [facility])    [address]  [facility] [id number] [phone]           Wear comfortable clothes and leave your valuables at home.            2017 10:00 AM CDT   Radiology MD with UR JHOAN MILLARD   [facility] Maternal Fetal Medicine - [facility])    [address]  [facility] [address]   [phone]           Please arrive at the time given for your first appointment. This visit is used internally to schedule the physician's time during your ultrasound.              Future tests that were ordered for you today     Open Future Orders        Priority Expected Expires Ordered    MFM US Comprehensive Single F/U Routine 10/31/2017 10/17/2018 10/17/2017    AFP Amniotic Reflex to Acetylchol STAT  1/15/2018 10/17/2017    CHROMOSOME AMNIOTIC FLUID With Professional Interpretation STAT  1/15/2018 10/17/2017    Cytogenomic Microarray SNP Fetal Routine  1/15/2018 10/17/2017            Who to contact     If you have questions or need follow up information about today's clinic visit or your schedule please contact [facility] MATERNAL FETAL MEDICINE  "- [place] directly at [phone].  Normal or non-critical lab and imaging results will be communicated to you by MyChart, letter or phone within 4 business days after the clinic has received the results. If you do not hear from us within 7 days, please contact the clinic through Redeemhart or phone. If you have a critical or abnormal lab result, we will notify you by phone as soon as possible.  Submit refill requests through "[employer]" or call your pharmacy and they will forward the refill request to us. Please allow 3 business days for your refill to be completed.          Additional Information About Your Visit        RedeemharWalkbase Information     "[employer]" lets you send messages to your doctor, view your test results, renew your prescriptions, schedule appointments and more. To sign up, go to [email or web address]" . Click on \"Log in\" on the left side of the screen, which will take you to the Welcome page. Then click on \"Sign up Now\" on the right side of the page.     You will be asked to enter the access code listed below, as well as some personal information. Please follow the directions to create your username and password.     Your access code is: [email or web address]  Expires: 2017  3:55 PM     Your access code will  in 90 days. If you need help or a new code, please call your [place] clinic or [phone].        Care EveryWhere ID     This is your Care EveryWhere ID. This could be used by other organizations to access your [place] medical records  [id number]        Your Vitals Were     Last Period                   (LMP Unknown)            Blood Pressure from Last 3 Encounters:   10/06/17 112/75   17 115/73   17 111/64    Weight from Last 3 Encounters:   10/06/17 79.5 kg (175 lb 3.2 oz)   17 76.2 kg (168 lb)   17 75.8 kg (167 lb)              We Performed the Following     [facility] Genetic Counseling     Obtain affirmation of informed consent        Primary Care Provider    None Specified "       No primary provider on file.        Equal Access to Services     LISA FRASER : Hadii aad ku hadreeghanshyam Bentley, waxavierjose hatch, qalinanilesh cardosomakenia stoddard. So [facility] [phone].    ATENCIÓN: Si habla español, tiene a tejeda disposición servicios gratuitos de asistencia lingüística. Llame al [phone].    We comply with applicable federal civil rights laws and [place] laws. We do not discriminate on the basis of race, color, national origin, age, disability, sex, sexual orientation, or gender identity.            Thank you!     Thank you for choosing [facility]  for your care. Our goal is always to provide you with excellent care. Hearing back from our patients is one way we can continue to improve our services. Please take a few minutes to complete the written survey that you may receive in the mail after your visit with us. Thank you!             Your Updated Medication List - Protect others around you: Learn how to safely use, store and throw away your medicines at www.disposemymeds.org.          This list is accurate as of: 10/17/17 11:59 PM.  Always use your most recent med list.                   Brand Name Dispense Instructions for use Diagnosis    ondansetron 4 MG ODT tab    ZOFRAN ODT    20 tablet    Take 1-2 tablets (4-8 mg) by mouth every 6 hours as needed for nausea    Prenatal care, subsequent pregnancy in second trimester       prenatal multivitamin plus iron 27-0.8 MG Tabs per tablet      Take 1 tablet by mouth daily

## 2017-10-17 NOTE — PROGRESS NOTES
[facility]  Genetic Counseling Consult    Patient: Sandra Steve YOB: 1985   Date of Service: 10/17/17      Sandra Steve was seen, along with her  Austin, at [facility] for genetic consultation to discuss the options for screening and testing for fetal chromosome abnormalities.  The indication for genetic counseling is fetal bilateral club foot, clenched hands, and macrocephaly.       Impression/Plan:   Today we discussed ultrasound findings thus far in context of fetal MRI.  We reviewed that the presence of both bilateral clubfoot and clenched hands for Jodie Saeed is concerning for a possible underlying neurological or muscular condition that is affecting movement for the hands and feet.  The possibilities of distal arthrogryposis versus a different genetic condition versus a chromosomal condition were reviewed.  We also discussed that there is a possibility that while Christophe's hands appear clenched, they may eventually open and be unaffected.  Isolated clubfoot is still a possibility.  Genetic testing was discussed as the option that could potentially give the most clarifying information prenatally.  We also reviewed recommendations for neurology and genetics consult postnatally.  Because of this, Dr. Rios has recommended for delivery at [facility].  The fetal MRI was also reviewed.  We discussed that it is reassuring that no structural abnormalities were identified within the brain.  The ultrasound findings of enlarged HC, OFC, OFD, CM, and BPD may represent normal variation or isolated macrocephaly.  Association with a genetic syndrome cannot currently be excluded.      Sandra had planned to proceed with genetic amniocentesis today; however, fetal/placental positioning made the amniocentesis technically difficult and painful for Sandra.  Unfortunately no amniotic fluid was obtained and Sandra opted not to  reattempt today.  A follow up ultrasound has been scheduled for  and Sandra could opt for another genetic amniocentesis attempt at that time.      Pregnancy History:   /Parity:    Age at Delivery: 32 year old    2015:  (Daughter - Zen) due to failure of progression  JAIRO: 2018, by Ultrasound  Gestational Age: 26w4d    Sandra was originally seen in [facility] for ultrasound on 17 at 21w3d.  She was referred for an indication of choroid plexus cysts, which was noted on 20w ultrasound at her primary clinic.  Ultrasound in [facility] did not note CPCs; however, bilateral clubfoot was noted.  In addition, it was noted that the baby's hands did not open throughout the ultrasound.  The couple was counseled regarding the findings and had NIPT (Innatal) screening through [facility].  Sandra and Austin also both had their blood drawn and sent for SMA carrier screening through [facility].  All of this testing was negative.      Sandra returned to [facility] clinic on 10/5/17 for follow up ultrasound at 24 weeks.  This ultrasound continued to note bilateral clubfoot and hands that were not visualized to open.  In addition, the cistern magna was measured at 10.2mm (enlarged) and the baby's head was noted to be large (OFC, BPD, and OFD all >90%ile and HC at 98%ile).  All other brain anatomy appeared normal with normal ventricle measurements and limb measurements were normal.      Fetal MRI completed here today showed normal brain anatomy, large cranium, bilateral clubfoot, bilateral clenched hands, and normal muscle bulk.      Medical/Social History:   Sandra s reported medical history is not expected to impact pregnancy management or risks to fetal development.  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  She did have the flu a week ago with body aches and GI symptoms; however does not think she had a fever.  No other history  of fevers nor rashes were reported.  Her  works for [employer] and similarly reports no exposures of concern.  Both of their families live in the same town as them and have been very supportive.  .          Family History:   A three-generation pedigree was obtained, and is scanned under the  Media  tab.   The following significant findings were reported by Sandra and Austin:    Unilateral clubfoot that required casting was reported for Austin's paternal uncle's son.  Given that there 3 unaffected family members between this cousin to Austin and his children, we discussed that it is unlikely that the finding contributed to the risk of clubfoot in the current pregnancy.      Otherwise, the reported family history is negative for multiple miscarriages, stillbirths, birth defects, mental retardation, known genetic conditions, and consanguinity.       Carrier Screening:   The patient reports that she is of  ancestry:      Cystic fibrosis is an autosomal recessive genetic condition that occurs with increased frequency in individuals of  ancestry and carrier screening for this condition is available.  In addition,  screening in the [facility] includes cystic fibrosis.    Austin reports that he possibly has Ashkenazi [place] ancestry:      There is a group of several autosomal recessive genetic conditions that occur with increased frequency in individuals of [place]kenazi [place] ancestry, such as Cj Sachs disease and Canavan disease.  Carrier screening is available for a variety of these conditions.      Expanded carrier screening for mutations in a large panel of genes associated with autosomal recessive conditions including cystic fibrosis, spinal muscular atrophy, and others, is now available.      The patient has had previous carrier screening for SMA, the results of which were normal.  A copy of the report was available for our review today.  Further carrier screening  was declined.       Risk Assessment for Chromosome Conditions:   We explained that the risk for fetal chromosome abnormalities increases with maternal age. We discussed specific features of common chromosome abnormalities, including Down syndrome, trisomy 13, trisomy 18, and sex chromosome trisomies.      - At age 32 at midtrimester, the risk to have a baby with Down syndrome is 1 in 690.     - At age 32 at midtrimester, the risk to have a baby with any chromosome abnormality is 1 in 345.     The noted ultrasound findings increases the chance of aneuploidy in the pregnancy.  While the normal NIPT results are reassuring, we discussed that this does not definitively rule out trisomies 21, 18, and 13, nor does it screen for all aneuploidy that is possible.           Testing Options:   We discussed the following options:     Genetic Amniocentesis    Invasive procedure typically performed in the second trimester by which amniotic fluid is obtained for the purpose of chromosome analysis and/or other prenatal genetic analysis    Diagnostic results; >99% sensitivity for fetal chromosome abnormalities    AFAFP measurement tests for open neural tube defects    Recommended testing will be done stepwise with microarray and afAFP on direct fluid sent to [facility], chromosome analysis on cultured cells at [facility].  [facility] would also be directed to culture cells for sendout for arthrogryposis gene panel and to maintain a backup culture.  If all results are normal, prenatal whole exome could be considered.        We reviewed the benefits and limitations of this testing.  Screening tests provide a risk assessment specific to the pregnancy for certain fetal chromosome abnormalities, but cannot definitively diagnose or exclude a fetal chromosome abnormality.  Follow-up genetic counseling and consideration of diagnostic testing is recommended with any abnormal screening result.     Diagnostic tests carry inherent risks- including risk of  miscarriage- that require careful consideration.  These tests can detect fetal chromosome abnormalities with greater than 99% certainty.  Results can be compromised by maternal cell contamination or mosaicism, and are limited by the resolution of cytogenetic G-banding technology.  There is no screening nor diagnostic test that can detect all forms of birth defects or mental disability.     It was a pleasure to be involved with Sandra s care. Face-to-face time of the meeting was 60 minutes.    Halle Recio, [facility]  Certified Genetic Counselor  Pager: [phone]

## 2017-10-17 NOTE — NURSING NOTE
Pt here for amniocentesis d/t bilateral club feet. Saw [facility], see their dictation.  After consent signed and TimeOut completed, Dr. Tran transabdominal pass x1 but was unable to withdrew adequate fluid as patient was unable to tolerate procedure.  Discharge teaching completed and questions answered.  Pt discharged ambulatory and stable.  Patient reports moderate pain with attempt, no pain when she left.  Pt is RH positive   Judi Brady RN

## 2017-11-02 ENCOUNTER — HOSPITAL ENCOUNTER (OUTPATIENT)
Dept: ULTRASOUND IMAGING | Facility: CLINIC | Age: 32
Discharge: HOME | End: 2017-11-02
Attending: OBSTETRICS & GYNECOLOGY | Admitting: OBSTETRICS & GYNECOLOGY
Payer: COMMERCIAL

## 2017-11-02 ENCOUNTER — PRENATAL OFFICE VISIT (OUTPATIENT)
Dept: OBGYN | Facility: CLINIC | Age: 32
End: 2017-11-02
Payer: COMMERCIAL

## 2017-11-02 ENCOUNTER — OFFICE VISIT (OUTPATIENT)
Dept: MATERNAL FETAL MEDICINE | Facility: CLINIC | Age: 32
End: 2017-11-02
Attending: OBSTETRICS & GYNECOLOGY
Payer: COMMERCIAL

## 2017-11-02 VITALS
BODY MASS INDEX: 31.29 KG/M2 | WEIGHT: 176.6 LBS | DIASTOLIC BLOOD PRESSURE: 71 MMHG | HEIGHT: 63 IN | SYSTOLIC BLOOD PRESSURE: 114 MMHG | HEART RATE: 99 BPM

## 2017-11-02 DIAGNOSIS — Z34.82 PRENATAL CARE, SUBSEQUENT PREGNANCY IN SECOND TRIMESTER: Primary | ICD-10-CM

## 2017-11-02 DIAGNOSIS — O35.HXX0 CLUB FOOT OF FETUS AFFECTING ANTEPARTUM CARE OF MOTHER, SINGLE OR UNSPECIFIED FETUS: Primary | ICD-10-CM

## 2017-11-02 DIAGNOSIS — O35.9XX0 SUSPECTED FETAL ANOMALY, ANTEPARTUM, SINGLE OR UNSPECIFIED FETUS: ICD-10-CM

## 2017-11-02 DIAGNOSIS — O35.09X0: ICD-10-CM

## 2017-11-02 PROCEDURE — 99207 ZZC PRENATAL VISIT: CPT | Performed by: OBSTETRICS & GYNECOLOGY

## 2017-11-02 PROCEDURE — 90471 IMMUNIZATION ADMIN: CPT | Performed by: OBSTETRICS & GYNECOLOGY

## 2017-11-02 PROCEDURE — 76816 OB US FOLLOW-UP PER FETUS: CPT

## 2017-11-02 PROCEDURE — 90715 TDAP VACCINE 7 YRS/> IM: CPT | Performed by: OBSTETRICS & GYNECOLOGY

## 2017-11-02 NOTE — NURSING NOTE
"No chief complaint on file.      Initial /71 (BP Location: Left arm, Patient Position: Sitting, Cuff Size: Adult Large)  Pulse 99  Ht 5' 3\" (1.6 m)  Wt 176 lb 9.6 oz (80.1 kg)  LMP  (LMP Unknown)  Breastfeeding? No  BMI 31.28 kg/m2 Estimated body mass index is 31.28 kg/(m^2) as calculated from the following:    Height as of this encounter: 5' 3\" (1.6 m).    Weight as of this encounter: 176 lb 9.6 oz (80.1 kg).  Medication Reconciliation: complete   Mignon Townsend CMA      "

## 2017-11-02 NOTE — MR AVS SNAPSHOT
After Visit Summary   11/2/2017    Sandra Steve    MRN: [id number]           Patient Information     Date Of Birth          1985        Visit Information        Provider Department      11/2/2017 2:15 PM Kendy Bolden MD [facility]        Today's Diagnoses     Prenatal care, subsequent pregnancy in second trimester    -  1       Follow-ups after your visit        Follow-up notes from your care team     Return in about 2 weeks (around 11/16/2017).      Your next 10 appointments already scheduled     Nov 14, 2017  3:00 PM CST   ESTABLISHED PRENATAL with Chika Phillips MD   [facility] ([facility])    [address] [address]   [phone]            Nov 15, 2017  3:00 PM CST   (Arrive by 2:45 PM)   New Pediatric Visit with Urmila Fritz MD   [facility] ([facility])    [address] [place] [address] [phone]            Nov 30, 2017  9:30 AM CST   MFM US COMPRE SINGLE F/U with [id number]   [facility])    [address]  [facility] [address] [phone]           Wear comfortable clothes and leave your valuables at home.            Nov 30, 2017 10:00 AM CST   Radiology MD with UR KRISTEN MILLARD   [facility])    [address]  [facility] [address]   [phone]           Please arrive at the time given for your first appointment. This visit is used internally to schedule the physician's time during your ultrasound.              Future tests that were ordered for you today     Open Future Orders        Priority Expected Expires Ordered    MFM US Comprehensive Single F/U Routine 11/30/2017 11/2/2018 11/2/2017            Who to contact     If you have questions or  "need follow up information about today's clinic visit or your schedule please contact [facility] directly at [phone].  Normal or non-critical lab and imaging results will be communicated to you by NameMediahart, letter or phone within 4 business days after the clinic has received the results. If you do not hear from us within 7 days, please contact the clinic through [employer] or phone. If you have a critical or abnormal lab result, we will notify you by phone as soon as possible.  Submit refill requests through [employer] or call your pharmacy and they will forward the refill request to us. Please allow 3 business days for your refill to be completed.          Additional Information About Your Visit        NameMediaharAltheos Information     [employer] lets you send messages to your doctor, view your test results, renew your prescriptions, schedule appointments and more. To sign up, go to [email or web address] . Click on \"Log in\" on the left side of the screen, which will take you to the Welcome page. Then click on \"Sign up Now\" on the right side of the page.     You will be asked to enter the access code listed below, as well as some personal information. Please follow the directions to create your username and password.     Your access code is: [email or web address]  Expires: 2017  3:55 PM     Your access code will  in 90 days. If you need help or a new code, please call your [place] clinic or [phone].        Care EveryWhere ID     This is your Care EveryWhere ID. This could be used by other organizations to access your [facility] medical records  [id number]        Your Vitals Were     Pulse Height Last Period Breastfeeding? BMI (Body Mass Index)       99 5' 3\" (1.6 m) (LMP Unknown) No 31.28 kg/m2        Blood Pressure from Last 3 Encounters:   17 114/71   10/06/17 112/75   17 115/73    Weight from Last 3 Encounters:   17 176 lb 9.6 oz (80.1 kg)   10/06/17 175 lb 3.2 oz (79.5 kg)   17 " 168 lb (76.2 kg)              We Performed the Following     TDAP VACCINE (ADACEL)        Primary Care Provider Office Phone # Fax #    Kendy Bolden [phone]       [facility] [address] [facility] [address]        Equal Access to Services     LISA FRASER : Hadii aad ku hadasho Soomaali, waaxda luqadaha, qaybta kaalmada adeegyada, waxay idiin hayaan adeeg khpadminisol lakenjin david. So [facility] [phone].    ATENCIÓN: Si habla español, tiene a tejeda disposición servicios gratuitos de asistencia lingüística. Llame al [phone].    We comply with applicable federal civil rights laws and [place] laws. We do not discriminate on the basis of race, color, national origin, age, disability, sex, sexual orientation, or gender identity.            Thank you!     Thank you for choosing [facility]  for your care. Our goal is always to provide you with excellent care. Hearing back from our patients is one way we can continue to improve our services. Please take a few minutes to complete the written survey that you may receive in the mail after your visit with us. Thank you!             Your Updated Medication List - Protect others around you: Learn how to safely use, store and throw away your medicines at www.disposemymeds.org.          This list is accurate as of: 11/2/17  2:39 PM.  Always use your most recent med list.                   Brand Name Dispense Instructions for use Diagnosis    ondansetron 4 MG ODT tab    ZOFRAN ODT    20 tablet    Take 1-2 tablets (4-8 mg) by mouth every 6 hours as needed for nausea    Prenatal care, subsequent pregnancy in second trimester       prenatal multivitamin plus iron 27-0.8 MG Tabs per tablet      Take 1 tablet by mouth daily

## 2017-11-02 NOTE — PROGRESS NOTES
Doing pretty well, baby has clubbed feet which has been shocking to learn, but they are doing well with it now.  +FM, no ctx, no VB or LOF.    32 year old  at 28w6d   - US today at [facility] - results printed and given to pt.  Repeat for EFW scheduled in 4 weeks  - plan for transfer to [facility] after 36 weeks to schedule delivery (via 39 week CS) there for fetal anomaly  - TDaP today  - plans to formula feed  - unsure about BTL at the time of CS or not, with the fetal anomaly, although they think it might be their last baby    RTC 2 weeks    Kendy Bolden MD, MPH  [facility]

## 2017-11-02 NOTE — MR AVS SNAPSHOT
After Visit Summary   11/2/2017    Sandra Steve    MRN: [id number]           Patient Information     Date Of Birth          1985        Visit Information        Provider Department      11/2/2017 10:00 AM Luis Cárdenas MD [facility] Maternal Fetal Medicine [facility]        Today's Diagnoses     Club foot of fetus affecting antepartum care of mother, single or unspecified fetus    -  1    Suspected fetal anomaly, antepartum, single or unspecified fetus           Follow-ups after your visit        Your next 10 appointments already scheduled     Nov 02, 2017  2:15 PM CDT   ESTABLISHED PRENATAL with Kendy Bolden MD   [facility]    [address] [facility] [address]   [phone]            Nov 30, 2017  9:30 AM CST   MFM US COMPRE SINGLE F/U with [id number]   [facility] Maternal Fetal Medicine Ultrasound - [place] ([facility])    [address]  [facility] [address] [phone]           Wear comfortable clothes and leave your valuables at home.            Nov 30, 2017 10:00 AM CST   Radiology MD with UR KRISTEN MILLARD   [facility] Maternal Fetal Medicine - [facility])    [address]  [facility] [address]   [phone]           Please arrive at the time given for your first appointment. This visit is used internally to schedule the physician's time during your ultrasound.              Future tests that were ordered for you today     Open Future Orders        Priority Expected Expires Ordered    MFM US Comprehensive Single F/U Routine 11/30/2017 11/2/2018 11/2/2017            Who to contact     If you have questions or need follow up information about today's clinic visit or your schedule please contact [facility] directly at [phone].  Normal or non-critical lab and imaging results will be communicated to  "you by MyChart, letter or phone within 4 business days after the clinic has received the results. If you do not hear from us within 7 days, please contact the clinic through [employer] or phone. If you have a critical or abnormal lab result, we will notify you by phone as soon as possible.  Submit refill requests through [employer] or call your pharmacy and they will forward the refill request to us. Please allow 3 business days for your refill to be completed.          Additional Information About Your Visit        MyfacepageharBambuser Information     [employer] lets you send messages to your doctor, view your test results, renew your prescriptions, schedule appointments and more. To sign up, go to [email or web address] . Click on \"Log in\" on the left side of the screen, which will take you to the Welcome page. Then click on \"Sign up Now\" on the right side of the page.     You will be asked to enter the access code listed below, as well as some personal information. Please follow the directions to create your username and password.     Your access code is: [email or web address]  Expires: 2017  3:55 PM     Your access code will  in 90 days. If you need help or a new code, please call your [place] clinic or [phone].        Care EveryWhere ID     This is your Care EveryWhere ID. This could be used by other organizations to access your [place] medical records  [id number]        Your Vitals Were     Last Period                   (LMP Unknown)            Blood Pressure from Last 3 Encounters:   10/06/17 112/75   17 115/73   17 111/64    Weight from Last 3 Encounters:   10/06/17 79.5 kg (175 lb 3.2 oz)   17 76.2 kg (168 lb)   17 75.8 kg (167 lb)               Primary Care Provider    None Specified       No primary provider on file.        Equal Access to Services     [facility] EVELIO : Jaime Bentley, randall lushahnazadaha, qaybta kaalmajose middleton, kenia hernandez. So [facility] " [phone].    ATENCIÓN: Si raminla som, tiene a tejeda disposición servicios gratuitos de asistencia lingüística. Rock al [phone].    We comply with applicable federal civil rights laws and [place] laws. We do not discriminate on the basis of race, color, national origin, age, disability, sex, sexual orientation, or gender identity.            Thank you!     Thank you for choosing [facility]  for your care. Our goal is always to provide you with excellent care. Hearing back from our patients is one way we can continue to improve our services. Please take a few minutes to complete the written survey that you may receive in the mail after your visit with us. Thank you!             Your Updated Medication List - Protect others around you: Learn how to safely use, store and throw away your medicines at www.disposemymeds.org.          This list is accurate as of: 11/2/17 11:10 AM.  Always use your most recent med list.                   Brand Name Dispense Instructions for use Diagnosis    ondansetron 4 MG ODT tab    ZOFRAN ODT    20 tablet    Take 1-2 tablets (4-8 mg) by mouth every 6 hours as needed for nausea    Prenatal care, subsequent pregnancy in second trimester       prenatal multivitamin plus iron 27-0.8 MG Tabs per tablet      Take 1 tablet by mouth daily

## 2017-11-02 NOTE — NURSING NOTE
Pt and partner given contact information for Dr. Urmila Fritz to schedule consult regarding fetus' bilateral clubbed feet.  Contact number [phone] option #1.  Pt and partner report understanding with no further questions.

## 2017-11-07 NOTE — TELEPHONE ENCOUNTER
APPT INFO    Date /Time: 11/15/17 3pm   Reason for Appt: Club Feet Prenatal   Ref Provider/Clinic: Dr. Hodan Rios   Are there internal records? If yes, list: Yes - [facility]   Patient Contact (Y/N) & Call Details: No -internal referral   Action: --

## 2017-11-14 ENCOUNTER — PRENATAL OFFICE VISIT (OUTPATIENT)
Dept: OBGYN | Facility: CLINIC | Age: 32
End: 2017-11-14
Payer: COMMERCIAL

## 2017-11-14 ENCOUNTER — TELEPHONE (OUTPATIENT)
Dept: OBGYN | Facility: CLINIC | Age: 32
End: 2017-11-14

## 2017-11-14 VITALS
BODY MASS INDEX: 31.4 KG/M2 | HEART RATE: 87 BPM | WEIGHT: 177.2 LBS | DIASTOLIC BLOOD PRESSURE: 71 MMHG | HEIGHT: 63 IN | SYSTOLIC BLOOD PRESSURE: 114 MMHG

## 2017-11-14 DIAGNOSIS — Z34.83 PRENATAL CARE, SUBSEQUENT PREGNANCY IN THIRD TRIMESTER: Primary | ICD-10-CM

## 2017-11-14 PROCEDURE — 99207 ZZC PRENATAL VISIT: CPT | Performed by: OBSTETRICS & GYNECOLOGY

## 2017-11-14 NOTE — TELEPHONE ENCOUNTER
----- Message from Urmila Cuevas MD sent at 11/14/2017  4:33 PM CST -----  Regarding: FW: follow up  Please schedule her with me 11/30 after MFM appt.  (and call and let her know that she can just come to 7th floor when done)    Thanks  Dalila    ----- Message -----     From: Chika Phillips MD     Sent: 11/14/2017   3:10 PM       To: Urmila Cuevas MD  Subject: follow up                                        Hey there!    She has an ultrasound with [facility] on 11/30 @ 930.  She would love to meet you if you can squeeze her into your schedule that day.    I'm assuming you are ok with her doing visits up here otherwise.      Lemme know how you want to proceed.    :)    Thanks for taking care of my gal!    Chika Phillips M.D.

## 2017-11-14 NOTE — NURSING NOTE
"Chief Complaint   Patient presents with     Prenatal Care       Initial /71 (BP Location: Right arm, Patient Position: Chair, Cuff Size: Adult Regular)  Pulse 87  Ht 5' 3\" (1.6 m)  Wt 177 lb 3.2 oz (80.4 kg)  LMP  (LMP Unknown)  Breastfeeding? No  BMI 31.39 kg/m2 Estimated body mass index is 31.39 kg/(m^2) as calculated from the following:    Height as of this encounter: 5' 3\" (1.6 m).    Weight as of this encounter: 177 lb 3.2 oz (80.4 kg).  Medication Reconciliation: complete   Shonna Khan CMA      "

## 2017-11-14 NOTE — PROGRESS NOTES
"CC: Here for routine prenatal visit @ 30w4d   HPI: + FM, no ctx, no LOF, no VB.  No complaints.     PE: /71 (BP Location: Right arm, Patient Position: Chair, Cuff Size: Adult Regular)  Pulse 87  Ht 5' 3\" (1.6 m)  Wt 177 lb 3.2 oz (80.4 kg)  LMP  (LMP Unknown)  Breastfeeding? No  BMI 31.39 kg/m2   See OB flowsheet    Rh positive    A/P  @ 30w4d normal pregnancy, prior , fetal anomalies    1. Routine prenatal care.  Encouraged patient to review contents of Prenatal Breastfeeding Education Toolkit. Offered opportunity to answer questions regarding the importance of skin to skin contact, early initiation of exclusive breastfeeding for the first six months and rooming in while in the hospital.     Patient planning on Bottlefeeding.     2.  planned at [place] due to baby's potential needs.  Planning on more thorough work up after delivery.  Surgery scheduled for 18    RTC 2 weeks.      Chika Phillips M.D.    "

## 2017-11-14 NOTE — MR AVS SNAPSHOT
After Visit Summary   11/14/2017    Sandra Steve    MRN: [id number]           Patient Information     Date Of Birth          1985        Visit Information        Provider Department      11/14/2017 3:00 PM Chika Phillips MD [facility]        Today's Diagnoses     Prenatal care, subsequent pregnancy in third trimester    -  1       Follow-ups after your visit        Your next 10 appointments already scheduled     Nov 15, 2017  3:00 PM CST   (Arrive by 2:45 PM)   New Pediatric Visit with Urmila Fritz MD   [facility] ([facility])    [address]  4th Floor  [facility] [address] [phone]            Nov 30, 2017  9:30 AM CST   MFM US COMPRE SINGLE F/U with [id number]   [facility] - [facility])    [address]  [facility] [id number] [phone]           Wear comfortable clothes and leave your valuables at home.            Nov 30, 2017 10:00 AM CST   Radiology MD with ALEX PETERSON MD   [facility] - [facility])    [address]  [facility] [address]   [phone]           Please arrive at the time given for your first appointment. This visit is used internally to schedule the physician's time during your ultrasound.            Jan 09, 2018  1:00 PM CST   LAB with RD LAB   [facility] ([facility])    [address] [place] [id number] [phone]           Please do not eat 10-12 hours before your appointment if you are coming in fasting for labs on lipids, cholesterol, or glucose (sugar). This does not apply to pregnant women. Water, hot tea and black coffee (with nothing added) are okay. Do not drink other fluids, diet soda or chew gum.            Jan 12, 2018   Procedure with Urmila ROCHA  "MD Faith   [facility] (--)    [address]  [facility] [place] [address] [phone]              Who to contact     If you have questions or need follow up information about today's clinic visit or your schedule please contact [facility] directly at [phone].  Normal or non-critical lab and imaging results will be communicated to you by MyChart, letter or phone within 4 business days after the clinic has received the results. If you do not hear from us within 7 days, please contact the clinic through MyChart or phone. If you have a critical or abnormal lab result, we will notify you by phone as soon as possible.  Submit refill requests through [employer] or call your pharmacy and they will forward the refill request to us. Please allow 3 business days for your refill to be completed.          Additional Information About Your Visit        [employer] Information     [employer] lets you send messages to your doctor, view your test results, renew your prescriptions, schedule appointments and more. To sign up, go to [email or web address] . Click on \"Log in\" on the left side of the screen, which will take you to the Welcome page. Then click on \"Sign up Now\" on the right side of the page.     You will be asked to enter the access code listed below, as well as some personal information. Please follow the directions to create your username and password.     Your access code is: [email or web address]  Expires: 2017  2:55 PM     Your access code will  in 90 days. If you need help or a new code, please call your [employer] clinic or [phone].        Care EveryWhere ID     This is your Care EveryWhere ID. This could be used by other organizations to access your [employer] medical records  [id number]        Your Vitals Were     Pulse Height Last Period Breastfeeding? BMI (Body Mass Index)       87 5' 3\" (1.6 m) (LMP Unknown) No 31.39 kg/m2        Blood Pressure from Last 3 Encounters:   17 114/71   17 " 114/71   10/06/17 112/75    Weight from Last 3 Encounters:   11/14/17 177 lb 3.2 oz (80.4 kg)   11/02/17 176 lb 9.6 oz (80.1 kg)   10/06/17 175 lb 3.2 oz (79.5 kg)              Today, you had the following     No orders found for display       Primary Care Provider Office Phone # Fax #    Kendy Bolden [phone]       [facility] [address] [facility] [address]        Equal Access to Services     MOMO FRASER : Hadii gayle canas hadreeo Soliam, waaxda luqadaha, qaybta kaalmada emi, kenia tapia . So [facility] [phone].    ATENCIÓN: Si habla español, tiene a tejeda disposición servicios gratuitos de asistencia lingüística. Llame al [phone].    We comply with applicable federal civil rights laws and [place] laws. We do not discriminate on the basis of race, color, national origin, age, disability, sex, sexual orientation, or gender identity.            Thank you!     Thank you for choosing [facility]  for your care. Our goal is always to provide you with excellent care. Hearing back from our patients is one way we can continue to improve our services. Please take a few minutes to complete the written survey that you may receive in the mail after your visit with us. Thank you!             Your Updated Medication List - Protect others around you: Learn how to safely use, store and throw away your medicines at www.disposemymeds.org.          This list is accurate as of: 11/14/17  3:15 PM.  Always use your most recent med list.                   Brand Name Dispense Instructions for use Diagnosis    ondansetron 4 MG ODT tab    ZOFRAN ODT    20 tablet    Take 1-2 tablets (4-8 mg) by mouth every 6 hours as needed for nausea    Prenatal care, subsequent pregnancy in second trimester       prenatal multivitamin plus iron 27-0.8 MG Tabs per tablet      Take 1 tablet by mouth daily

## 2017-11-14 NOTE — PROGRESS NOTES
Prenatal Breastfeeding Education Toolkit provided for patient to review, helping her to make an informed decision on a feeding choice for her baby. Questions directed to the provider.  Declined at today's visit.  Shonna Khan, [facility]

## 2017-11-15 ENCOUNTER — OFFICE VISIT (OUTPATIENT)
Dept: ORTHOPEDICS | Facility: CLINIC | Age: 32
End: 2017-11-15

## 2017-11-15 ENCOUNTER — PRE VISIT (OUTPATIENT)
Dept: ORTHOPEDICS | Facility: CLINIC | Age: 32
End: 2017-11-15

## 2017-11-15 DIAGNOSIS — Z71.89 PRENATAL CONSULT: Primary | ICD-10-CM

## 2017-11-15 NOTE — MR AVS SNAPSHOT
After Visit Summary   11/15/2017    Sandra Steve    MRN: [id number]           Patient Information     Date Of Birth          1985        Visit Information        Provider Department      11/15/2017 3:00 PM Urmila Fritz MD [facility]        Today's Diagnoses     Prenatal consult    -  1      Care Instructions    Will be seen when baby is born at [place].  Number given          Follow-ups after your visit        Your next 10 appointments already scheduled     Nov 30, 2017  9:30 AM CST   MFM US COMPRE SINGLE F/U with [id number]   [facility]tras[facility] - [facility])    [address]  [facility] [id number] [phone]           Wear comfortable clothes and leave your valuables at home.            Nov 30, 2017 10:00 AM CST   Radiology MD with UR KRISTEN MILLARD   [facility]    [address]  [facility] [address]   [phone]           Please arrive at the time given for your first appointment. This visit is used internally to schedule the physician's time during your ultrasound.            Nov 30, 2017 10:15 AM CST   ESTABLISHED PRENATAL with Urmila Cuevas MD   [facility] ([facility])    [address] [place] [address] [address]  [facility] [address] [phone]            Jan 09, 2018  1:00 PM CST   LAB with RD LAB   [facility] ([facility])    [address] [place] [address]  [facility] [address] [phone]           Please do not eat 10-12 hours before your appointment if you are coming in fasting for labs on lipids, cholesterol, or glucose (sugar). This does not apply to pregnant women. Water, hot tea and black coffee (with nothing added) are okay. Do not drink other fluids, diet soda or chew gum.            Jan 12, 2018    Procedure with Urmila Cuevas MD   [facility] (--)    [address]  [facility] [place] [address] [phone]              Who to contact     Please call your clinic at [phone] to:    Ask questions about your health    Make or cancel appointments    Discuss your medicines    Learn about your test results    Speak to your doctor   If you have compliments or concerns about an experience at your clinic, or if you wish to file a complaint, please contact [facility] Patient Relations at [phone] or email us at [email or web address]         Additional Information About Your Visit        [employer] Information     [employer] gives you secure access to your electronic health record. If you see a primary care provider, you can also send messages to your care team and make appointments. If you have questions, please call your primary care clinic.  If you do not have a primary care provider, please call [phone] and they will assist you.      [employer] is an electronic gateway that provides easy, online access to your medical records. With [employer], you can request a clinic appointment, read your test results, renew a prescription or communicate with your care team.     To access your existing account, please contact your [facility] or call [phone] for assistance.        Care EveryWhere ID     This is your Care EveryWhere ID. This could be used by other organizations to access your [place] medical records  [id number]        Your Vitals Were     Last Period                   (LMP Unknown)            Blood Pressure from Last 3 Encounters:   11/14/17 114/71   11/02/17 114/71   10/06/17 112/75    Weight from Last 3 Encounters:   11/14/17 80.4 kg (177 lb 3.2 oz)   11/02/17 80.1 kg (176 lb 9.6 oz)   10/06/17 79.5 kg (175 lb 3.2 oz)              Today, you had the following     No orders found for display       Primary Care Provider Office Phone # Mgl  #    Kendy Bolden [phone]       [facility] [address] [facility] [address]        Equal Access to Services     LISA FRASER : Hadii aad ku hadreeghanshyam Sheree, waaxda luqadaha, qaybta kaalmada emi, kenia chambers sonuangie soler regina hernandez. So [facility] [phone].    ATENCIÓN: Si habla español, tiene a tejeda disposición servicios gratuitos de asistencia lingüística. Llame al [phone].    We comply with applicable federal civil rights laws and [place] laws. We do not discriminate on the basis of race, color, national origin, age, disability, sex, sexual orientation, or gender identity.            Thank you!     Thank you for choosing [facility]  for your care. Our goal is always to provide you with excellent care. Hearing back from our patients is one way we can continue to improve our services. Please take a few minutes to complete the written survey that you may receive in the mail after your visit with us. Thank you!             Your Updated Medication List - Protect others around you: Learn how to safely use, store and throw away your medicines at www.disposemymeds.org.          This list is accurate as of: 11/15/17 11:59 PM.  Always use your most recent med list.                   Brand Name Dispense Instructions for use Diagnosis    ondansetron 4 MG ODT tab    ZOFRAN ODT    20 tablet    Take 1-2 tablets (4-8 mg) by mouth every 6 hours as needed for nausea    Prenatal care, subsequent pregnancy in second trimester       prenatal multivitamin plus iron 27-0.8 MG Tabs per tablet      Take 1 tablet by mouth daily

## 2017-11-15 NOTE — LETTER
11/15/2017       RE: Sandra Steve  [address]  [place] [facility] [facility] [address]     Dear Colleague,    Thank you for referring your patient, Sandra Steve, to the [facility] at [facility]. Please see a copy of my visit note below.    HISTORY OF PRESENT ILLNESS:  32-year-old lady; she is accompanied by her mom.  She is here for a prenatal consultation regarding ultrasound suggested bilateral clubfeet.  Mom reports that this is the second pregnancy for her; the first sister was born and her feet were okay.  She did have bowleggedness.  This baby is a boy.  He is appropriate size by ultrasound.  They are noting bilateral clubfeet on 2 sequential ultrasounds and there is some suggestion that his hands are not opening.  The original ultrasound was ordered because he has some sort of lesion on his head and initially, they thought he might have hydrocephalus which is ruled out for that.  Mom denies any speculation about Myelomenigocele but they have mentioned the concept of distal arthrogryposis to her.  She has been healthy during the pregnancy and there have been no other problems.  We had a very lengthy discussion about clubfeet and I did provide her with information from the [employer] website regarding clubfeet, the pathophysiology of clubfeet, the standard of care correction, that being Ponseti casting which would started at 4-6 weeks of age at 8 cm of foot size to be best result.  We would like the baby to be 60 weeks corrected gestational age for percutaneous Achilles tenotomy if done in the operating room to avoid hospitalization overnight.  The casting is 6-7 casts with typical feet and once we have achieved appropriate correction, then we would schedule for an Achilles tenotomy which would require 3 weeks of postoperative casting with the feet 20 degrees of dorsiflexion.  After that, they would move on to phase 2 into Ponseti sandals and bars at 70 degrees of  external rotation, 20 degrees of dorsiflexion with the checks at that point would get spread out a little bit.  That would be worn full time until 6 months of age and then nights and naps until age 3.  Multiple questions about the semantics of getting the casting and how that would all proceed were solicited and answered and I think mom feels happy with the plan.  They will be seen after the baby is born at 4-6 weeks at [place] and that number was provided to them.  They would prefer the [place] location.        Greater than 50% of this 45 minute appointment was spent in counseling and direct coordination of care.         URMILA ENCARNACION MD             D: 11/15/2017 15:15   T: 11/15/2017 23:15   MT: REESE      Name:     WALTER PLUNKETT   MRN:      [id number]        Account:      [id number]   :      1985           Service Date: 11/15/2017      Document: [id number]        Again, thank you for allowing me to participate in the care of your patient.      Sincerely,    Urmila Encarnacion MD

## 2017-11-16 NOTE — PROGRESS NOTES
HISTORY OF PRESENT ILLNESS:  32-year-old lady; she is accompanied by her mom.  She is here for a prenatal consultation regarding ultrasound suggested bilateral clubfeet.  Mom reports that this is the second pregnancy for her; the first sister was born and her feet were okay.  She did have bowleggedness.  This baby is a boy.  He is appropriate size by ultrasound.  They are noting bilateral clubfeet on 2 sequential ultrasounds and there is some suggestion that his hands are not opening.  The original ultrasound was ordered because he has some sort of lesion on his head and initially, they thought he might have hydrocephalus which is ruled out for that.  Mom denies any speculation about Myelomenigocele but they have mentioned the concept of distal arthrogryposis to her.  She has been healthy during the pregnancy and there have been no other problems.  We had a very lengthy discussion about clubfeet and I did provide her with information from the [employer] website regarding clubfeet, the pathophysiology of clubfeet, the standard of care correction, that being Ponseti casting which would started at 4-6 weeks of age at 8 cm of foot size to be best result.  We would like the baby to be 60 weeks corrected gestational age for percutaneous Achilles tenotomy if done in the operating room to avoid hospitalization overnight.  The casting is 6-7 casts with typical feet and once we have achieved appropriate correction, then we would schedule for an Achilles tenotomy which would require 3 weeks of postoperative casting with the feet 20 degrees of dorsiflexion.  After that, they would move on to phase 2 into Ponseti sandals and bars at 70 degrees of external rotation, 20 degrees of dorsiflexion with the checks at that point would get spread out a little bit.  That would be worn full time until 6 months of age and then nights and naps until age 3.  Multiple questions about the semantics of getting the casting and how that would all  proceed were solicited and answered and I think mom feels happy with the plan.  They will be seen after the baby is born at 4-6 weeks at [place] and that number was provided to them.  They would prefer the [place] location.        Greater than 50% of this 45 minute appointment was spent in counseling and direct coordination of care.         KAYE ENCARNACION MD             D: 11/15/2017 15:15   T: 11/15/2017 23:15   MT: REESE      Name:     WALTER PLUNKETT   MRN:      [id number]        Account:      [id number]   :      1985           Service Date: 11/15/2017      Document: [id number]

## 2017-11-30 ENCOUNTER — TELEPHONE (OUTPATIENT)
Dept: OBGYN | Facility: CLINIC | Age: 32
End: 2017-11-30

## 2017-11-30 ENCOUNTER — HOSPITAL ENCOUNTER (OUTPATIENT)
Dept: ULTRASOUND IMAGING | Facility: CLINIC | Age: 32
Discharge: HOME | End: 2017-11-30
Attending: OBSTETRICS & GYNECOLOGY | Admitting: OBSTETRICS & GYNECOLOGY
Payer: COMMERCIAL

## 2017-11-30 ENCOUNTER — OFFICE VISIT (OUTPATIENT)
Dept: MATERNAL FETAL MEDICINE | Facility: CLINIC | Age: 32
End: 2017-11-30
Attending: OBSTETRICS & GYNECOLOGY
Payer: COMMERCIAL

## 2017-11-30 ENCOUNTER — PRENATAL OFFICE VISIT (OUTPATIENT)
Dept: OBGYN | Facility: CLINIC | Age: 32
End: 2017-11-30
Payer: COMMERCIAL

## 2017-11-30 VITALS
DIASTOLIC BLOOD PRESSURE: 65 MMHG | BODY MASS INDEX: 31.89 KG/M2 | OXYGEN SATURATION: 97 % | TEMPERATURE: 98.6 F | WEIGHT: 180 LBS | HEART RATE: 89 BPM | SYSTOLIC BLOOD PRESSURE: 103 MMHG

## 2017-11-30 DIAGNOSIS — O35.09X0: ICD-10-CM

## 2017-11-30 DIAGNOSIS — O35.HXX0 CLUB FOOT OF FETUS AFFECTING ANTEPARTUM CARE OF MOTHER, SINGLE OR UNSPECIFIED FETUS: ICD-10-CM

## 2017-11-30 DIAGNOSIS — O35.9XX0: Primary | ICD-10-CM

## 2017-11-30 DIAGNOSIS — Z34.83 PRENATAL CARE, SUBSEQUENT PREGNANCY, THIRD TRIMESTER: Primary | ICD-10-CM

## 2017-11-30 PROCEDURE — 99207 ZZC PRENATAL VISIT: CPT | Performed by: OBSTETRICS & GYNECOLOGY

## 2017-11-30 PROCEDURE — 76816 OB US FOLLOW-UP PER FETUS: CPT

## 2017-11-30 ASSESSMENT — PATIENT HEALTH QUESTIONNAIRE - PHQ9: SUM OF ALL RESPONSES TO PHQ QUESTIONS 1-9: 4

## 2017-11-30 NOTE — PROGRESS NOTES
Just came from [facility] and per pt head was measuring big and they recc weekly BPP's after 36 wks. Discussed should be able to have those done in [place]. She is considering tubal ligation, discussed other forms of birthcontrol though since did not sound sure of that. Repeat c/s was discussed in detail including risk of bleeding, infection, damage to abdominal organs including bowel, bladder, blood vessels, nerves, and baby.   Recovery period/hosptial stay and restrictions discussed.  Pain medications after surgery were discussed.  All questions were answered.  BE

## 2017-11-30 NOTE — TELEPHONE ENCOUNTER
Called pt left message to return call.  Order has been placed for BPP to be done here in 3 weeks (12-21-17).    Maeve Mercer  [facility] RN

## 2017-11-30 NOTE — Clinical Note
Saw your nice couple today. :)  MFM report not in chart yet, but she stated they recc BPP weekly at 36 weeks.  I thought you could do those up there and told her that. I don't need to see her again until surgery date.  Thanks Dalila

## 2017-11-30 NOTE — TELEPHONE ENCOUNTER
Reason for Call:  Other call back    Detailed comments: Yesenia from [facility] calling with update on this pt.  Their MD is recommending that she start weekly BPP's in 3 weeks.  She is due to go back to [facility] in 4 weeks.  They would like her to have her first one here in [place] in 3 weeks if possible.  No need to call back - they just wanted to let you know the recommendations.  They will also send a report.    Phone Number Patient can be reached at: [phone]    Best Time: any    Can we leave a detailed message on this number? YES    Call taken on 11/30/2017 at 9:45 AM by Elsie Moreno

## 2017-11-30 NOTE — MR AVS SNAPSHOT
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            Dec 28, 2017 10:00 AM CST   Radiology MD with UR JHOAN MILLARD   [facility] Maternal Fetal Medicine - [facility])    [address]  [facility] [address] [phone]           Please arrive at the time given for your first appointment. This visit is used internally to schedule the physician's time during your ultrasound.            Jan 09, 2018  1:00 PM CST   LAB with RD LAB   [facility] ([facility])    [address] [place] [address]  [facility] [address] [phone]           Please do not eat 10-12 hours before your appointment if you are coming in fasting for labs on lipids, cholesterol, or glucose (sugar). This does not apply to pregnant women. Water, hot tea and black coffee (with nothing added) are okay. Do not drink other fluids, diet soda or chew gum.            Jan 12, 2018   Procedure with Urmila Cuevas MD   [facility] (--)    [address]  [facility] [id number] [phone]              Future tests that were ordered for you today     Open Future Orders        Priority Expected Expires  Ordered    US Fetal Biophys Prof w/o Non Stress Test Routine  11/30/2018 11/30/2017    MFM US Comprehensive Single F/U Routine 12/28/2017 11/30/2018 11/30/2017            Who to contact     If you have questions or need follow up information about today's clinic visit or your schedule please contact [facility] MATERNAL FETAL MEDICINE [facility] directly at [phone].  Normal or non-critical lab and imaging results will be communicated to you by VividCortexhart, letter or phone within 4 business days after the clinic has received the results. If you do not hear from us within 7 days, please contact the clinic through Fipeot or phone. If you have a critical or abnormal lab result, we will notify you by phone as soon as possible.  Submit refill requests through SlimTrader or call your pharmacy and they will forward the refill request to us. Please allow 3 business days for your refill to be completed.          Additional Information About Your Visit        VividCortexharP[employer] Information     SlimTrader gives you secure access to your electronic health record. If you see a primary care provider, you can also send messages to your care team and make appointments. If you have questions, please call your primary care clinic.  If you do not have a primary care provider, please call [phone] and they will assist you.        Care EveryWhere ID     This is your Care EveryWhere ID. This could be used by other organizations to access your [place] medical records  [id number]        Your Vitals Were     Last Period                   (LMP Unknown)            Blood Pressure from Last 3 Encounters:   11/30/17 103/65   11/14/17 114/71   11/02/17 114/71    Weight from Last 3 Encounters:   11/30/17 81.6 kg (180 lb)   11/14/17 80.4 kg (177 lb 3.2 oz)   11/02/17 80.1 kg (176 lb 9.6 oz)               Primary Care Provider Office Phone # Fax #    Kendy Bolden [phone] [address] [facility] [address] [place] [address]  [phone]        Equal Access to Services     [facility] CenterTWILA : Hadii aad ku hadreeghanshyam Collinali, waxavierda lushahnazessieha, qalinanilesh alegreyawkenia stoddard. So [facility] [phone].    ATENCIÓN: Si habla español, tiene a tejeda disposición servicios gratuitos de asistencia lingüística. Llame al [phone].    We comply with applicable federal civil rights laws and [place] laws. We do not discriminate on the basis of race, color, national origin, age, disability, sex, sexual orientation, or gender identity.            Thank you!     Thank you for choosing [facility]  for your care. Our goal is always to provide you with excellent care. Hearing back from our patients is one way we can continue to improve our services. Please take a few minutes to complete the written survey that you may receive in the mail after your visit with us. Thank you!             Your Updated Medication List - Protect others around you: Learn how to safely use, store and throw away your medicines at www.disposemymeds.org.          This list is accurate as of: 11/30/17  4:55 PM.  Always use your most recent med list.                   Brand Name Dispense Instructions for use Diagnosis    ondansetron 4 MG ODT tab    ZOFRAN ODT    20 tablet    Take 1-2 tablets (4-8 mg) by mouth every 6 hours as needed for nausea    Prenatal care, subsequent pregnancy in second trimester       prenatal multivitamin plus iron 27-0.8 MG Tabs per tablet      Take 1 tablet by mouth daily

## 2017-11-30 NOTE — TELEPHONE ENCOUNTER
Pt notified of below.  Pt reports understanding.  Pt does not have further questions or concerns.  Patient will call for an appointment.    Sowmya Patricia   [facility]  [facility]

## 2017-11-30 NOTE — PROGRESS NOTES
Please see full imaging report from ViewPoint program under imaging tab.    Plans delivery at [facility].    Yuki Jung MD  Maternal Fetal Medicine

## 2017-11-30 NOTE — NURSING NOTE
Pt at [facility] today for f/u u/s.  Dr. Jung recommending weekly BPPs starting in 3 weeks.  Spoke with Silvana at [facility] regarding recommendation and possibility of pt having BPPs done in [place] in 3, 5 and 6 weeks.  BPP in 4 weeks will be done along with growth u/s at [facility] office.

## 2017-11-30 NOTE — MR AVS SNAPSHOT
After Visit Summary   11/30/2017    Sandra Steve    MRN: [id number]           Patient Information     Date Of Birth          1985        Visit Information        Provider Department      11/30/2017 10:15 AM Urmila Cuevas MD [facility]        Today's Diagnoses     Prenatal care, subsequent pregnancy, third trimester    -  1       Follow-ups after your visit        Your next 10 appointments already scheduled     Dec 28, 2017  9:30 AM CST   (Arrive by 9:15 AM)   MFM US COMPRE SINGLE F/U with [id number]   [facility] Maternal Fetal Medicine Ultrasound - [facility])    [address]  [facility] [address] [phone]           Wear comfortable clothes and leave your valuables at home.            Dec 28, 2017 10:00 AM CST   Radiology MD with UR KRISTEN MILLARD   [facility] Maternal Fetal Medicine - [facility])    [address]  [facility] [address] [phone]           Please arrive at the time given for your first appointment. This visit is used internally to schedule the physician's time during your ultrasound.            Jan 09, 2018  1:00 PM CST   LAB with RD LAB   [facility] ([facility])    [address] [place] [address]  [facility] [address] [phone]           Please do not eat 10-12 hours before your appointment if you are coming in fasting for labs on lipids, cholesterol, or glucose (sugar). This does not apply to pregnant women. Water, hot tea and black coffee (with nothing added) are okay. Do not drink other fluids, diet soda or chew gum.            Jan 12, 2018   Procedure with Urmila Cuevas MD   [facility] (--)    [address]  [facility] [address] [phone]              Future tests that were ordered for you today     Open Future Orders        Priority Expected Expires Ordered    US Fetal Biophys Prof w/o  Non Stress Test Routine  11/30/2018 11/30/2017    [facility] Comprehensive Single F/U Routine 12/28/2017 11/30/2018 11/30/2017            Who to contact     If you have questions or need follow up information about today's clinic visit or your schedule please contact [facility] directly at [phone].  Normal or non-critical lab and imaging results will be communicated to you by MyChart, letter or phone within 4 business days after the clinic has received the results. If you do not hear from us within 7 days, please contact the clinic through [employer] or phone. If you have a critical or abnormal lab result, we will notify you by phone as soon as possible.  Submit refill requests through CargoSpotter or call your pharmacy and they will forward the refill request to us. Please allow 3 business days for your refill to be completed.          Additional Information About Your Visit        [employer] Information     CargoSpotter gives you secure access to your electronic health record. If you see a primary care provider, you can also send messages to your care team and make appointments. If you have questions, please call your primary care clinic.  If you do not have a primary care provider, please call [phone] and they will assist you.        Care EveryWhere ID     This is your Care EveryWhere ID. This could be used by other organizations to access your [place] medical records  [id number]        Your Vitals Were     Pulse Temperature Last Period Pulse Oximetry BMI (Body Mass Index)       89 98.6  F (37  C) (Oral) (LMP Unknown) 97% 31.89 kg/m2        Blood Pressure from Last 3 Encounters:   11/30/17 103/65   11/14/17 114/71   11/02/17 114/71    Weight from Last 3 Encounters:   11/30/17 180 lb (81.6 kg)   11/14/17 177 lb 3.2 oz (80.4 kg)   11/02/17 176 lb 9.6 oz (80.1 kg)              Today, you had the following     No orders found for display       Primary Care Provider Office Phone # Fax #    Kendy Ann  MD Kimi [phone]       [address] [facility] [address] [facility] [address]        Equal Access to Services     LISA FRASER : Hadii aad ku hadreeghanshyam Bentley, waxavierda holden, qalinata kaalmada emi, kenia hungin hayaapolina ascencioangie soler regina hernandez. So [facility] [phone].    ATENCIÓN: Si habla español, tiene a tejeda disposición servicios gratuitos de asistencia lingüística. Llame al [phone].    We comply with applicable federal civil rights laws and [place] laws. We do not discriminate on the basis of race, color, national origin, age, disability, sex, sexual orientation, or gender identity.            Thank you!     Thank you for choosing [facility]  for your care. Our goal is always to provide you with excellent care. Hearing back from our patients is one way we can continue to improve our services. Please take a few minutes to complete the written survey that you may receive in the mail after your visit with us. Thank you!             Your Updated Medication List - Protect others around you: Learn how to safely use, store and throw away your medicines at www.disposemymeds.org.          This list is accurate as of: 11/30/17  1:30 PM.  Always use your most recent med list.                   Brand Name Dispense Instructions for use Diagnosis    ondansetron 4 MG ODT tab    ZOFRAN ODT    20 tablet    Take 1-2 tablets (4-8 mg) by mouth every 6 hours as needed for nausea    Prenatal care, subsequent pregnancy in second trimester       prenatal multivitamin plus iron 27-0.8 MG Tabs per tablet      Take 1 tablet by mouth daily

## 2017-12-12 ENCOUNTER — PRENATAL OFFICE VISIT (OUTPATIENT)
Dept: OBGYN | Facility: CLINIC | Age: 32
End: 2017-12-12
Payer: COMMERCIAL

## 2017-12-12 VITALS
WEIGHT: 183 LBS | SYSTOLIC BLOOD PRESSURE: 123 MMHG | DIASTOLIC BLOOD PRESSURE: 72 MMHG | BODY MASS INDEX: 32.42 KG/M2 | HEART RATE: 110 BPM

## 2017-12-12 DIAGNOSIS — Z34.83 PRENATAL CARE, SUBSEQUENT PREGNANCY, THIRD TRIMESTER: Primary | ICD-10-CM

## 2017-12-12 PROCEDURE — 99207 ZZC PRENATAL VISIT: CPT | Performed by: OBSTETRICS & GYNECOLOGY

## 2017-12-12 NOTE — PROGRESS NOTES
Doing well.   Requests a letter to provide to her insurance for coverage of progeniti testing.  Denies VB, ctx, LOF. +FM  /72 (BP Location: Right arm, Patient Position: Chair, Cuff Size: Adult Large)  Pulse 110  Wt 183 lb (83 kg)  LMP  (LMP Unknown)  Breastfeeding? No  BMI 32.42 kg/m2  General Appearance: NAD  Abdomen: Gravid, NT  Refer to flow sheet above.   A/P: 32 year old  at 34w4d  -- letter provided to patient that states justification for progeniti testing  -- reviewed recent () [facility] US showing accelerated cranial growth without hydrocephalus or any intracranial abnormalities seen on fetal head MRI; per MFM recommend weekly BPPs starting at 34 weeks to be performed at [facility] (this has been ordered); has upcoming follow-up appt with M on   -- PTL precautions reviewed  RTC in 1 week    Demetrio Cedillo MD  [facility]

## 2017-12-12 NOTE — MR AVS SNAPSHOT
After Visit Summary   12/12/2017    Sandra Steve    MRN: [id number]           Patient Information     Date Of Birth          1985        Visit Information        Provider Department      12/12/2017 3:30 PM Demetrio Cedillo MD [facility]        Today's Diagnoses     Prenatal care, subsequent pregnancy, third trimester    -  1       Follow-ups after your visit        Your next 10 appointments already scheduled     Dec 14, 2017  2:15 PM CST   (Arrive by 2:00 PM)   US FETAL BIOPHYS PROFILE W/O NON STRESS TEST with [address] Ultrasound ([facility])    [address] [facility] [address]   [phone]           Please bring a list of your medicines (including vitamins, minerals and over-the-counter drugs). Also, tell your doctor about any allergies you may have. Wear comfortable clothes and leave your valuables at home.  If you re less than 20 weeks drink four 8-ounce glasses of fluid an hour before your exam. If you need to empty your bladder before your exam, try to release only a little urine. Then, drink another glass of fluid.  You may have up to two family members in the exam room. If you bring a small child, an adult must be there to care for him or her.  Please call the Imaging Department at your exam site with any questions.            Dec 18, 2017 10:15 AM CST   ESTABLISHED PRENATAL with Chika Phillips MD   [facility] ([facility])    [address] [facility] [address]   [phone]            Dec 27, 2017  3:45 PM CST   ESTABLISHED PRENATAL with Silke Borja MD   [facility] ([facility])    [address] [facility] [address]   [phone]            Dec 28, 2017  9:30 AM CST   (Arrive by 9:15 AM)   MFM US COMPRE SINGLE F/U with [id number]   [facility] Maternal Fetal Medicine Ultrasound - [place] ([facility])    [address]  [facility] [address] [phone]           Wear comfortable clothes and leave your valuables at home.            Dec 28, 2017 10:00 AM CST   Radiology MD with ALEX PETERSON MD   [facility] Maternal Fetal Medicine [facility] ([facility])    [address]  [facility] [address]   [phone]           Please arrive at the time given for your first appointment. This visit is used internally to schedule the physician's time during your ultrasound.            Jan 04, 2018  3:00 PM CST   ESTABLISHED PRENATAL with Demetrio Cedillo MD   [facility]    [address] [facility] [address]   [phone]            Jan 09, 2018  1:00 PM CST   LAB with RD LAB   [facility]    [address] [place] [address] [phone]           Please do not eat 10-12 hours before your appointment if you are coming in fasting for labs on lipids, cholesterol, or glucose (sugar). This does not apply to pregnant women. Water, hot tea and black coffee (with nothing added) are okay. Do not drink other fluids, diet soda or chew gum.            Jan 11, 2018  3:00 PM CST   ESTABLISHED PRENATAL with Chika Phillips MD   [facility] ([facility])    [address] [facility] [address]   [phone]            Jan 12, 2018   Procedure with Urmila Cuevas MD   [facility] (--)    [address] [facility]  [facility] [address] [phone]              Future tests that were ordered for you today     Open Standing Orders        Priority Remaining Interval Expires Ordered    US Fetal Biophys Prof w/o Non Stress Test Routine 6/6 weekly 12/12/2018 12/12/2017          Open Future Orders        Priority Expected Expires Ordered    US Fetal Biophys Prof w/o Non Stress Test Routine  12/12/2018 12/12/2017            Who  to contact     If you have questions or need follow up information about today's clinic visit or your schedule please contact [facility] directly at [phone].  Normal or non-critical lab and imaging results will be communicated to you by MyChart, letter or phone within 4 business days after the clinic has received the results. If you do not hear from us within 7 days, please contact the clinic through MyChart or phone. If you have a critical or abnormal lab result, we will notify you by phone as soon as possible.  Submit refill requests through hetras or call your pharmacy and they will forward the refill request to us. Please allow 3 business days for your refill to be completed.          Additional Information About Your Visit        hetras Information     hetras gives you secure access to your electronic health record. If you see a primary care provider, you can also send messages to your care team and make appointments. If you have questions, please call your primary care clinic.  If you do not have a primary care provider, please call [phone] and they will assist you.        Care EveryWhere ID     This is your Care EveryWhere ID. This could be used by other organizations to access your [place] medical records  [id number]        Your Vitals Were     Pulse Last Period Breastfeeding? BMI (Body Mass Index)          110 (LMP Unknown) No 32.42 kg/m2         Blood Pressure from Last 3 Encounters:   12/12/17 123/72   11/30/17 103/65   11/14/17 114/71    Weight from Last 3 Encounters:   12/12/17 183 lb (83 kg)   11/30/17 180 lb (81.6 kg)   11/14/17 177 lb 3.2 oz (80.4 kg)               Primary Care Provider Office Phone # Fax #    Kendy Bolden [phone]       [address] [facility] [address] [facility] [address]        Equal Access to Services     LISA FRASER : Jaime Bentley, randall hatch, marleen middleton, kenia saez  karlene tapia ah. So [facility] [phone].    ATENCIÓN: Si habla som, tiene a tejeda disposición servicios gratuitos de asistencia lingüística. Rock al [phone].    We comply with applicable federal civil rights laws and [place] laws. We do not discriminate on the basis of race, color, national origin, age, disability, sex, sexual orientation, or gender identity.            Thank you!     Thank you for choosing [facility]  for your care. Our goal is always to provide you with excellent care. Hearing back from our patients is one way we can continue to improve our services. Please take a few minutes to complete the written survey that you may receive in the mail after your visit with us. Thank you!             Your Updated Medication List - Protect others around you: Learn how to safely use, store and throw away your medicines at www.disposemymeds.org.          This list is accurate as of: 12/12/17  3:54 PM.  Always use your most recent med list.                   Brand Name Dispense Instructions for use Diagnosis    ondansetron 4 MG ODT tab    ZOFRAN ODT    20 tablet    Take 1-2 tablets (4-8 mg) by mouth every 6 hours as needed for nausea    Prenatal care, subsequent pregnancy in second trimester       prenatal multivitamin plus iron 27-0.8 MG Tabs per tablet      Take 1 tablet by mouth daily

## 2017-12-12 NOTE — NURSING NOTE
"Initial /72 (BP Location: Right arm, Patient Position: Chair, Cuff Size: Adult Large)  Pulse 110  Wt 183 lb (83 kg)  LMP  (LMP Unknown)  Breastfeeding? No  BMI 32.42 kg/m2 Estimated body mass index is 32.42 kg/(m^2) as calculated from the following:    Height as of 11/14/17: 5' 3\" (1.6 m).    Weight as of this encounter: 183 lb (83 kg). .    Katty Matthews    "

## 2017-12-12 NOTE — LETTER
To whom it may concern,    The reason for running the progenity test on Sandra Steve was due to   What was seen on her anatomy ultrasound which was clubbed feet and clenched fists.    Thank you     Dr Demetrio Cedillo M.D.

## 2017-12-14 ENCOUNTER — HOSPITAL ENCOUNTER (OUTPATIENT)
Dept: ULTRASOUND IMAGING | Facility: CLINIC | Age: 32
Discharge: HOME | End: 2017-12-14
Attending: OBSTETRICS & GYNECOLOGY | Admitting: OBSTETRICS & GYNECOLOGY
Payer: COMMERCIAL

## 2017-12-14 DIAGNOSIS — Z34.83 PRENATAL CARE, SUBSEQUENT PREGNANCY, THIRD TRIMESTER: ICD-10-CM

## 2017-12-14 PROCEDURE — 76819 FETAL BIOPHYS PROFIL W/O NST: CPT

## 2017-12-15 NOTE — PROGRESS NOTES
Normal biophysical profile.   Will review at next follow-up visit.     Demetrio Cedillo MD  [facility]

## 2017-12-18 ENCOUNTER — PRENATAL OFFICE VISIT (OUTPATIENT)
Dept: OBGYN | Facility: CLINIC | Age: 32
End: 2017-12-18
Payer: COMMERCIAL

## 2017-12-18 VITALS
DIASTOLIC BLOOD PRESSURE: 68 MMHG | HEART RATE: 86 BPM | WEIGHT: 182.8 LBS | SYSTOLIC BLOOD PRESSURE: 110 MMHG | BODY MASS INDEX: 32.39 KG/M2 | HEIGHT: 63 IN

## 2017-12-18 DIAGNOSIS — Z34.83 PRENATAL CARE, SUBSEQUENT PREGNANCY, THIRD TRIMESTER: Primary | ICD-10-CM

## 2017-12-18 PROCEDURE — 99207 ZZC PRENATAL VISIT: CPT | Performed by: OBSTETRICS & GYNECOLOGY

## 2017-12-18 PROCEDURE — 87653 STREP B DNA AMP PROBE: CPT | Performed by: OBSTETRICS & GYNECOLOGY

## 2017-12-18 NOTE — NURSING NOTE
"Chief Complaint   Patient presents with     Prenatal Care       Initial /68 (BP Location: Right arm, Patient Position: Chair, Cuff Size: Adult Large)  Pulse 86  Ht 5' 3\" (1.6 m)  Wt 182 lb 12.8 oz (82.9 kg)  LMP  (LMP Unknown)  Breastfeeding? No  BMI 32.38 kg/m2 Estimated body mass index is 32.38 kg/(m^2) as calculated from the following:    Height as of this encounter: 5' 3\" (1.6 m).    Weight as of this encounter: 182 lb 12.8 oz (82.9 kg).  Medication Reconciliation: complete    Shonna Khan, DADA      "

## 2017-12-18 NOTE — PROGRESS NOTES
"CC: Here for routine prenatal visit @ 35w3d   HPI: + FM, no ctx, no LOF, no VB.  No complaints.     PE: /68 (BP Location: Right arm, Patient Position: Chair, Cuff Size: Adult Large)  Pulse 86  Ht 5' 3\" (1.6 m)  Wt 182 lb 12.8 oz (82.9 kg)  LMP  (LMP Unknown)  Breastfeeding? No  BMI 32.38 kg/m2   See OB flowsheet    A/P  @ 35w3d normal pregnancy    1. Routine prenatal care.   scheduled.  GBS today.    RTC 1 week    Chika Phillips M.D.    "

## 2017-12-18 NOTE — PROGRESS NOTES
Prenatal Breastfeeding Education Toolkit provided for patient to review, helping her to make an informed decision on a feeding choice for her baby. Questions directed to the provider.  Declined at today's visit.  Shonna Khan, [facility]

## 2017-12-18 NOTE — MR AVS SNAPSHOT
After Visit Summary   12/18/2017    Sadnra Steve    MRN: [id number]           Patient Information     Date Of Birth          1985        Visit Information        Provider Department      12/18/2017 10:15 AM Chika Phillips MD [facility]        Today's Diagnoses     Prenatal care, subsequent pregnancy, third trimester    -  1       Follow-ups after your visit        Your next 10 appointments already scheduled     Dec 21, 2017  2:00 PM CST   (Arrive by 1:45 PM)   US FETAL BIOPHYS PROFILE W/O NON STRESS TEST with [address] Ultrasound ([facility])    [address] [facility] [address]   [phone]           Please bring a list of your medicines (including vitamins, minerals and over-the-counter drugs). Also, tell your doctor about any allergies you may have. Wear comfortable clothes and leave your valuables at home.  If you re less than 20 weeks drink four 8-ounce glasses of fluid an hour before your exam. If you need to empty your bladder before your exam, try to release only a little urine. Then, drink another glass of fluid.  You may have up to two family members in the exam room. If you bring a small child, an adult must be there to care for him or her.  Please call the Imaging Department at your exam site with any questions.            Dec 27, 2017  3:45 PM CST   ESTABLISHED PRENATAL with Silke Borja MD   [facility] ([facility])    [address] [facility] [address]   [phone]            Dec 28, 2017  9:30 AM CST   (Arrive by 9:15 AM)   KRISTEN US COMPRE SINGLE F/U with URMFMUSR3   [facility] [place] ([facility])    [address]  [facility] [address] [phone]           Wear comfortable clothes and leave your valuables at home.            Dec 28, 2017 10:00 AM CST   Radiology MD with UR KRISTEN MILLARD    [facility] Maternal Fetal Medicine [facility] ([facility])    [address] [place] [address]   [phone]           Please arrive at the time given for your first appointment. This visit is used internally to schedule the physician's time during your ultrasound.            Jan 04, 2018  3:00 PM CST   ESTABLISHED PRENATAL with Demetrio [facility]edillo MD   [facility]    [address] [facility] [address] [phone]            Jan 09, 2018  1:00 PM CST   LAB with RD LAB   [facility] ([facility])    [address] [place] [id number] [phone]           Please do not eat 10-12 hours before your appointment if you are coming in fasting for labs on lipids, cholesterol, or glucose (sugar). This does not apply to pregnant women. Water, hot tea and black coffee (with nothing added) are okay. Do not drink other fluids, diet soda or chew gum.            Jan 11, 2018  3:00 PM CST   ESTABLISHED PRENATAL with Chika Phillips MD   [facility]    [address] [facility] [address]   [phone]            Jan 12, 2018   Procedure with Urmila Cuevas MD   [facility] (--)    [address] [facility] [address] [phone]              Who to contact     If you have questions or need follow up information about today's clinic visit or your schedule please contact [facility] directly at [phone].  Normal or non-critical lab and imaging results will be communicated to you by MyChart, letter or phone within 4 business days after the clinic has received the results. If you do not hear from us within 7 days, please contact the clinic through MyChart or phone. If you have a critical or abnormal lab result, we will notify you by phone as soon as possible.  Submit refill requests through [employer]  "or call your pharmacy and they will forward the refill request to us. Please allow 3 business days for your refill to be completed.          Additional Information About Your Visit        MyChart Information     [employer] gives you secure access to your electronic health record. If you see a primary care provider, you can also send messages to your care team and make appointments. If you have questions, please call your primary care clinic.  If you do not have a primary care provider, please call [phone] and they will assist you.        Care EveryWhere ID     This is your Care EveryWhere ID. This could be used by other organizations to access your [place] medical records  [id number]        Your Vitals Were     Pulse Height Last Period Breastfeeding? BMI (Body Mass Index)       86 5' 3\" (1.6 m) (LMP Unknown) No 32.38 kg/m2        Blood Pressure from Last 3 Encounters:   12/18/17 110/68   12/12/17 123/72   11/30/17 103/65    Weight from Last 3 Encounters:   12/18/17 182 lb 12.8 oz (82.9 kg)   12/12/17 183 lb (83 kg)   11/30/17 180 lb (81.6 kg)              We Performed the Following     Strep, Group B by PCR        Primary Care Provider Office Phone # Fax #    Kendy Bolden [phone]       [address] [facility] [address] [facility] [address]        Equal Access to Services     LISA FRASER : Hadii gayle ku hadasho Soliam, waaxda luqadaha, qaybta kaalmada adeangieyada, kenia tapia . So [facility] [phone].    ATENCIÓN: Si habla español, tiene a tejeda disposición servicios gratuitos de asistencia lingüística. Rock al [phone].    We comply with applicable federal civil rights laws and [place] laws. We do not discriminate on the basis of race, color, national origin, age, disability, sex, sexual orientation, or gender identity.            Thank you!     Thank you for choosing [facility]  for your care. Our goal is always to provide you with " excellent care. Hearing back from our patients is one way we can continue to improve our services. Please take a few minutes to complete the written survey that you may receive in the mail after your visit with us. Thank you!             Your Updated Medication List - Protect others around you: Learn how to safely use, store and throw away your medicines at www.disposemymeds.org.          This list is accurate as of: 12/18/17 10:55 AM.  Always use your most recent med list.                   Brand Name Dispense Instructions for use Diagnosis    ondansetron 4 MG ODT tab    ZOFRAN ODT    20 tablet    Take 1-2 tablets (4-8 mg) by mouth every 6 hours as needed for nausea    Prenatal care, subsequent pregnancy in second trimester       prenatal multivitamin plus iron 27-0.8 MG Tabs per tablet      Take 1 tablet by mouth daily

## 2017-12-19 LAB
GP B STREP DNA SPEC QL NAA+PROBE: NEGATIVE
SPECIMEN SOURCE: NORMAL

## 2017-12-21 ENCOUNTER — HOSPITAL ENCOUNTER (OUTPATIENT)
Dept: ULTRASOUND IMAGING | Facility: CLINIC | Age: 32
Discharge: HOME | End: 2017-12-21
Attending: OBSTETRICS & GYNECOLOGY | Admitting: OBSTETRICS & GYNECOLOGY
Payer: COMMERCIAL

## 2017-12-21 DIAGNOSIS — Z34.83 PRENATAL CARE, SUBSEQUENT PREGNANCY, THIRD TRIMESTER: ICD-10-CM

## 2017-12-21 PROCEDURE — 76819 FETAL BIOPHYS PROFIL W/O NST: CPT

## 2017-12-22 NOTE — PROGRESS NOTES
Normal biophysical profile.   Will review at next follow-up visit.     Demetrio Cedillo MD  [facility]

## 2017-12-27 ENCOUNTER — PRENATAL OFFICE VISIT (OUTPATIENT)
Dept: OBGYN | Facility: CLINIC | Age: 32
End: 2017-12-27
Payer: COMMERCIAL

## 2017-12-27 VITALS
WEIGHT: 182 LBS | HEART RATE: 66 BPM | RESPIRATION RATE: 18 BRPM | HEIGHT: 63 IN | BODY MASS INDEX: 32.25 KG/M2 | DIASTOLIC BLOOD PRESSURE: 64 MMHG | SYSTOLIC BLOOD PRESSURE: 118 MMHG | TEMPERATURE: 98.2 F

## 2017-12-27 DIAGNOSIS — Z98.891 H/O: C-SECTION: ICD-10-CM

## 2017-12-27 DIAGNOSIS — Z34.83 PRENATAL CARE, SUBSEQUENT PREGNANCY, THIRD TRIMESTER: Primary | ICD-10-CM

## 2017-12-27 PROCEDURE — 99207 ZZC PRENATAL VISIT: CPT | Performed by: OBSTETRICS & GYNECOLOGY

## 2017-12-27 NOTE — PROGRESS NOTES
"CC: Here for routine prenatal visit @ 36w5d   HPI:  Having more cramping in low pelvis; nothing timeable; describes some fetal movements that are unusual (like shimmying)    PE: /64 (BP Location: Right arm, Patient Position: Chair, Cuff Size: Adult Small)  Pulse 66  Temp 98.2  F (36.8  C) (Oral)  Resp 18  Ht 5' 3\" (1.6 m)  Wt 182 lb (82.6 kg)  LMP  (LMP Unknown)  BMI 32.24 kg/m2   See OB flowsheet      A:  1. Prenatal care, subsequent pregnancy, third trimester      2. H/O:         Routine prenatal care  RTC 1 weeks.      Silke Borja M.D.     "

## 2017-12-27 NOTE — MR AVS SNAPSHOT
After Visit Summary   2017    Sandra Steve    MRN: [id number]           Patient Information     Date Of Birth          1985        Visit Information        Provider Department      2017 3:45 PM Silke Borja MD [facility]        Today's Diagnoses     Prenatal care, subsequent pregnancy, third trimester    -  1    H/O:            Follow-ups after your visit        Your next 10 appointments already scheduled     Dec 28, 2017  9:30 AM CST   (Arrive by 9:15 AM)   MFM US COMPRE SINGLE F/U with URMFMUSR3   [facility] [place] ([facility])    [address]  [facility] [address] [phone]           Wear comfortable clothes and leave your valuables at home.            Dec 28, 2017 10:00 AM CST   Radiology MD with UR KRISTEN MILLARD   [facility])    [address]  [facility] [address]   [phone]           Please arrive at the time given for your first appointment. This visit is used internally to schedule the physician's time during your ultrasound.            2018  3:00 PM CST   ESTABLISHED PRENATAL with Demetrio Cedillo MD   [facility]    [facility] [address]   [phone]            2018  1:00 PM CST   LAB with RD LAB   [facility] ([facility])    [address] [place] [address] [phone]           Please do not eat 10-12 hours before your appointment if you are coming in fasting for labs on lipids, cholesterol, or glucose (sugar). This does not apply to pregnant women. Water, hot tea and black coffee (with nothing added) are okay. Do not drink other fluids, diet soda or chew gum.            2018  3:00 PM CST   ESTABLISHED  "PRENATAL with Chika Phillips MD   [facility] ([facility])    [address]  [facility] [address] [phone]            Jan 12, 2018   Procedure with Urmila Cuevas MD   [facility] (--)    [address]  [place] [address] [phone]              Who to contact     If you have questions or need follow up information about today's clinic visit or your schedule please contact [facility] directly at [phone].  Normal or non-critical lab and imaging results will be communicated to you by [employer], letter or phone within 4 business days after the clinic has received the results. If you do not hear from us within 7 days, please contact the clinic through [employer] or phone. If you have a critical or abnormal lab result, we will notify you by phone as soon as possible.  Submit refill requests through [employer] or call your pharmacy and they will forward the refill request to us. Please allow 3 business days for your refill to be completed.          Additional Information About Your Visit        [employer] Information     [employer] gives you secure access to your electronic health record. If you see a primary care provider, you can also send messages to your care team and make appointments. If you have questions, please call your primary care clinic.  If you do not have a primary care provider, please call [phone] and they will assist you.        Care EveryWhere ID     This is your Care EveryWhere ID. This could be used by other organizations to access your [place] medical records  [id number]        Your Vitals Were     Pulse Temperature Respirations Height Last Period BMI (Body Mass Index)    66 98.2  F (36.8  C) (Oral) 18 5' 3\" (1.6 m) (LMP Unknown) 32.24 kg/m2       Blood Pressure from Last 3 Encounters:   12/27/17 118/64   12/18/17 110/68   12/12/17 123/72    Weight from Last 3 Encounters:   12/27/17 182 lb (82.6 kg)   12/18/17 182 lb 12.8 oz " (82.9 kg)   12/12/17 183 lb (83 kg)              Today, you had the following     No orders found for display       Primary Care Provider Office Phone # Fax #    Kendy Bolden [phone]       [address] [facility] [address] [facility] [address]        Equal Access to Services     SENAMOMO EVELIO : Hadii aad ku hadasho Soomaali, waaxda luqadaha, qaybta kaalmada adeegyada, waxay idiin hayaan adeeg kharash la'aan . So [facility] [phone].    ATENCIÓN: Si habla español, tiene a tejeda disposición servicios gratuitos de asistencia lingüística. Llame al [phone].    We comply with applicable federal civil rights laws and [place] laws. We do not discriminate on the basis of race, color, national origin, age, disability, sex, sexual orientation, or gender identity.            Thank you!     Thank you for choosing [facility]  for your care. Our goal is always to provide you with excellent care. Hearing back from our patients is one way we can continue to improve our services. Please take a few minutes to complete the written survey that you may receive in the mail after your visit with us. Thank you!             Your Updated Medication List - Protect others around you: Learn how to safely use, store and throw away your medicines at www.disposemymeds.org.          This list is accurate as of: 12/27/17  3:59 PM.  Always use your most recent med list.                   Brand Name Dispense Instructions for use Diagnosis    ondansetron 4 MG ODT tab    ZOFRAN ODT    20 tablet    Take 1-2 tablets (4-8 mg) by mouth every 6 hours as needed for nausea    Prenatal care, subsequent pregnancy in second trimester       prenatal multivitamin plus iron 27-0.8 MG Tabs per tablet      Take 1 tablet by mouth daily

## 2017-12-28 ENCOUNTER — OFFICE VISIT (OUTPATIENT)
Dept: MATERNAL FETAL MEDICINE | Facility: CLINIC | Age: 32
End: 2017-12-28
Attending: OBSTETRICS & GYNECOLOGY
Payer: COMMERCIAL

## 2017-12-28 ENCOUNTER — HOSPITAL ENCOUNTER (OUTPATIENT)
Dept: ULTRASOUND IMAGING | Facility: CLINIC | Age: 32
Discharge: HOME | End: 2017-12-28
Attending: OBSTETRICS & GYNECOLOGY | Admitting: OBSTETRICS & GYNECOLOGY
Payer: COMMERCIAL

## 2017-12-28 DIAGNOSIS — O35.09X0: ICD-10-CM

## 2017-12-28 DIAGNOSIS — O35.HXX0 CLUB FOOT OF FETUS AFFECTING ANTEPARTUM CARE OF MOTHER, SINGLE OR UNSPECIFIED FETUS: ICD-10-CM

## 2017-12-28 DIAGNOSIS — O35.9XX0: Primary | ICD-10-CM

## 2017-12-28 DIAGNOSIS — Z98.891 H/O: C-SECTION: ICD-10-CM

## 2017-12-28 DIAGNOSIS — O35.9XX0: ICD-10-CM

## 2017-12-28 DIAGNOSIS — O35.9XX0 SUSPECTED FETAL ANOMALY, ANTEPARTUM, SINGLE OR UNSPECIFIED FETUS: ICD-10-CM

## 2017-12-28 PROCEDURE — 76816 OB US FOLLOW-UP PER FETUS: CPT

## 2017-12-28 PROCEDURE — 76819 FETAL BIOPHYS PROFIL W/O NST: CPT | Performed by: OBSTETRICS & GYNECOLOGY

## 2017-12-28 NOTE — MR AVS SNAPSHOT
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, single or unspecified fetus        H/O:         Suspected fetal anomaly, antepartum, single or unspecified fetus        Fetal macrocephaly affecting antepartum care of mother, not applicable or unspecified fetus           Follow-ups after your visit        Your next 10 appointments already scheduled     2018  3:00 PM CST   ESTABLISHED PRENATAL with Demetrio Cedillo MD   [facility]    [address] [facility] [address]   [phone]            2018  1:00 PM CST   LAB with RD LAB   [facility]    [address] [place] [address] [phone]           Please do not eat 10-12 hours before your appointment if you are coming in fasting for labs on lipids, cholesterol, or glucose (sugar). This does not apply to pregnant women. Water, hot tea and black coffee (with nothing added) are okay. Do not drink other fluids, diet soda or chew gum.            2018  3:00 PM CST   ESTABLISHED PRENATAL with Chika Phillips MD   [facility] ([facility])    [address] [facility] [address]   [phone]            2018   Procedure with Urmila Cuevas MD   [facility] (--)    [address] [place] [id number] [phone]              Who to contact     If you have questions or need follow up information about today's clinic visit or your schedule please  contact [facility] directly at [phone].  Normal or non-critical lab and imaging results will be communicated to you by MyChart, letter or phone within 4 business days after the clinic has received the results. If you do not hear from us within 7 days, please contact the clinic through [employer] or phone. If you have a critical or abnormal lab result, we will notify you by phone as soon as possible.  Submit refill requests through [employer] or call your pharmacy and they will forward the refill request to us. Please allow 3 business days for your refill to be completed.          Additional Information About Your Visit        [employer] Information     [employer] gives you secure access to your electronic health record. If you see a primary care provider, you can also send messages to your care team and make appointments. If you have questions, please call your primary care clinic.  If you do not have a primary care provider, please call [phone] and they will assist you.        Care EveryWhere ID     This is your Care EveryWhere ID. This could be used by other organizations to access your [place] medical records  [id number]        Your Vitals Were     Last Period                   (LMP Unknown)            Blood Pressure from Last 3 Encounters:   12/27/17 118/64   12/18/17 110/68   12/12/17 123/72    Weight from Last 3 Encounters:   12/27/17 82.6 kg (182 lb)   12/18/17 82.9 kg (182 lb 12.8 oz)   12/12/17 83 kg (183 lb)              Today, you had the following     No orders found for display       Primary Care Provider Office Phone # Fax #    Kendy Bolden [phone]       [address] [facility]  [facility] [address]        Equal Access to Services     MOMO FRASER : Jaime Bentley, randall hatch, kenia mcneill. So [facility] [phone].    ATENCIÓN: Si noe kearns, janine hu tejeda  disposición servicios gratuitos de asistencia lingüística. Rock dunham [phone].    We comply with applicable federal civil rights laws and [place] laws. We do not discriminate on the basis of race, color, national origin, age, disability, sex, sexual orientation, or gender identity.            Thank you!     Thank you for choosing [facility]  for your care. Our goal is always to provide you with excellent care. Hearing back from our patients is one way we can continue to improve our services. Please take a few minutes to complete the written survey that you may receive in the mail after your visit with us. Thank you!             Your Updated Medication List - Protect others around you: Learn how to safely use, store and throw away your medicines at www.disposemymeds.org.          This list is accurate as of: 12/28/17 10:14 AM.  Always use your most recent med list.                   Brand Name Dispense Instructions for use Diagnosis    ondansetron 4 MG ODT tab    ZOFRAN ODT    20 tablet    Take 1-2 tablets (4-8 mg) by mouth every 6 hours as needed for nausea    Prenatal care, subsequent pregnancy in second trimester       prenatal multivitamin plus iron 27-0.8 MG Tabs per tablet      Take 1 tablet by mouth daily

## 2018-01-04 ENCOUNTER — PRENATAL OFFICE VISIT (OUTPATIENT)
Dept: OBGYN | Facility: CLINIC | Age: 33
End: 2018-01-04
Payer: COMMERCIAL

## 2018-01-04 VITALS
RESPIRATION RATE: 14 BRPM | WEIGHT: 183 LBS | BODY MASS INDEX: 32.42 KG/M2 | DIASTOLIC BLOOD PRESSURE: 63 MMHG | SYSTOLIC BLOOD PRESSURE: 121 MMHG | TEMPERATURE: 97.1 F | HEART RATE: 75 BPM

## 2018-01-04 DIAGNOSIS — Z34.83 PRENATAL CARE, SUBSEQUENT PREGNANCY, THIRD TRIMESTER: Primary | ICD-10-CM

## 2018-01-04 PROCEDURE — 99207 ZZC PRENATAL VISIT: CPT | Performed by: OBSTETRICS & GYNECOLOGY

## 2018-01-04 NOTE — NURSING NOTE
"Initial /63 (BP Location: Right arm, Patient Position: Chair, Cuff Size: Adult Large)  Pulse 75  Temp 97.1  F (36.2  C) (Tympanic)  Resp 14  Wt 183 lb (83 kg)  LMP  (LMP Unknown)  Breastfeeding? No  BMI 32.42 kg/m2 Estimated body mass index is 32.42 kg/(m^2) as calculated from the following:    Height as of 12/27/17: 5' 3\" (1.6 m).    Weight as of this encounter: 183 lb (83 kg). .    Katty Matthews    "

## 2018-01-04 NOTE — MR AVS SNAPSHOT
After Visit Summary   1/4/2018    Sandra Steve    MRN: [id number]           Patient Information     Date Of Birth          1985        Visit Information        Provider Department      1/4/2018 3:00 PM Demetrio Cedillo MD [facility]        Today's Diagnoses     Prenatal care, subsequent pregnancy, third trimester    -  1       Follow-ups after your visit        Your next 10 appointments already scheduled     Jan 05, 2018 10:15 AM CST   (Arrive by 10:00 AM)   KRISTEN HORVATH SINGLE with [id number]   [facility] [place] ([facility])    [address]  [facility] [place] [address] [phone]            Jan 05, 2018 10:45 AM CST   Radiology MD with ALEX PETERSON MD   [facility])    [address]  [facility] [address]   [phone]           Please arrive at the time given for your first appointment. This visit is used internally to schedule the physician's time during your ultrasound.            Jan 09, 2018  1:00 PM CST   LAB with RD LAB   [facility]    [address] [place] [address]  [facility] [place] [address] [phone]           Please do not eat 10-12 hours before your appointment if you are coming in fasting for labs on lipids, cholesterol, or glucose (sugar). This does not apply to pregnant women. Water, hot tea and black coffee (with nothing added) are okay. Do not drink other fluids, diet soda or chew gum.            Jan 11, 2018 11:45 AM CST   (Arrive by 11:30 AM)   KRISTEN HORVATH SINGLE with [id number]   [facility] [place] ([facility])    [address]  [facility] [place] [address] [phone]            Jan 11, 2018 12:15 PM CST   Radiology MD with ALEX PETERSON MD   [facility]  [place] ([facility])    [address]  [place] [address]   [phone]           Please arrive at the time given for your first appointment. This visit is used internally to schedule the physician's time during your ultrasound.            Jan 11, 2018  3:00 PM CST   ESTABLISHED PRENATAL with Chika Phillips MD   [facility] ([facility])    [address]  [facility] [address] [phone]            Jan 12, 2018   Procedure with Urmila Cuevas MD   [facility] (--)    [address]  [facility] [place] [address] [phone]              Who to contact     If you have questions or need follow up information about today's clinic visit or your schedule please contact [facility] directly at [phone].  Normal or non-critical lab and imaging results will be communicated to you by MyChart, letter or phone within 4 business days after the clinic has received the results. If you do not hear from us within 7 days, please contact the clinic through Freshtake Mediahart or phone. If you have a critical or abnormal lab result, we will notify you by phone as soon as possible.  Submit refill requests through TrackIF or call your pharmacy and they will forward the refill request to us. Please allow 3 business days for your refill to be completed.          Additional Information About Your Visit        [employer]ht[employer] One Information     TrackIF gives you secure access to your electronic health record. If you see a primary care provider, you can also send messages to your care team and make appointments. If you have questions, please call your primary care clinic.  If you do not have a primary care provider, please call [phone] and they will assist you.        Care EveryWhere ID     This is your Care EveryWhere ID. This could be used by other organizations to access your [facility] medical records  [id number]        Your Vitals Were     Pulse  Temperature Respirations Last Period Breastfeeding? BMI (Body Mass Index)    75 97.1  F (36.2  C) (Tympanic) 14 (LMP Unknown) No 32.42 kg/m2       Blood Pressure from Last 3 Encounters:   01/04/18 121/63   12/27/17 118/64   12/18/17 110/68    Weight from Last 3 Encounters:   01/04/18 183 lb (83 kg)   12/27/17 182 lb (82.6 kg)   12/18/17 182 lb 12.8 oz (82.9 kg)              Today, you had the following     No orders found for display       Primary Care Provider Office Phone # Fax #    Kendy Bolden [phone]       [address] [facility] [address] [facility] [address]        Equal Access to Services     LISA FRASER : Hadii aad ku hadasho Soliam, waaxda luqadaha, qaybta kaalmada adeegyada, kenia tapia . So [facility] [phone].    ATENCIÓN: Si habla español, tiene a tejeda disposición servicios gratuitos de asistencia lingüística. Llame al [phone].    We comply with applicable federal civil rights laws and [place] laws. We do not discriminate on the basis of race, color, national origin, age, disability, sex, sexual orientation, or gender identity.            Thank you!     Thank you for choosing [facility]  for your care. Our goal is always to provide you with excellent care. Hearing back from our patients is one way we can continue to improve our services. Please take a few minutes to complete the written survey that you may receive in the mail after your visit with us. Thank you!             Your Updated Medication List - Protect others around you: Learn how to safely use, store and throw away your medicines at www.disposemymeds.org.          This list is accurate as of: 1/4/18  3:48 PM.  Always use your most recent med list.                   Brand Name Dispense Instructions for use Diagnosis    prenatal multivitamin plus iron 27-0.8 MG Tabs per tablet      Take 1 tablet by mouth daily

## 2018-01-04 NOTE — PROGRESS NOTES
Doing well.   Reports rare mild ctxs  Denies VB,  LOF. +FM  /63 (BP Location: Right arm, Patient Position: Chair, Cuff Size: Adult Large)  Pulse 75  Temp 97.1  F (36.2  C) (Tympanic)  Resp 14  Wt 183 lb (83 kg)  LMP  (LMP Unknown)  Breastfeeding? No  BMI 32.42 kg/m2  General Appearance: NAD  Abdomen: Gravid, NT  Refer to flow sheet above.   A/P: 32 year old  at 37w6d  -- labor precautions reviewed  RTC in 1 week    Demetrio Cedillo MD  [facility]

## 2018-01-05 ENCOUNTER — OFFICE VISIT (OUTPATIENT)
Dept: MATERNAL FETAL MEDICINE | Facility: CLINIC | Age: 33
End: 2018-01-05
Attending: OBSTETRICS & GYNECOLOGY
Payer: COMMERCIAL

## 2018-01-05 ENCOUNTER — HOSPITAL ENCOUNTER (OUTPATIENT)
Dept: ULTRASOUND IMAGING | Facility: CLINIC | Age: 33
Discharge: HOME | End: 2018-01-05
Attending: OBSTETRICS & GYNECOLOGY | Admitting: OBSTETRICS & GYNECOLOGY
Payer: COMMERCIAL

## 2018-01-05 DIAGNOSIS — O35.HXX0 CLUB FOOT OF FETUS AFFECTING ANTEPARTUM CARE OF MOTHER, SINGLE OR UNSPECIFIED FETUS: Primary | ICD-10-CM

## 2018-01-05 DIAGNOSIS — O35.9XX0 SUSPECTED FETAL ANOMALY, ANTEPARTUM, SINGLE OR UNSPECIFIED FETUS: ICD-10-CM

## 2018-01-05 DIAGNOSIS — O35.HXX0 CLUB FOOT OF FETUS AFFECTING ANTEPARTUM CARE OF MOTHER, SINGLE OR UNSPECIFIED FETUS: ICD-10-CM

## 2018-01-05 PROCEDURE — 76819 FETAL BIOPHYS PROFIL W/O NST: CPT

## 2018-01-05 NOTE — MR AVS SNAPSHOT
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, single or unspecified fetus    -  1    Suspected fetal anomaly, antepartum, single or unspecified fetus           Follow-ups after your visit        Your next 10 appointments already scheduled     Jan 09, 2018  1:00 PM CST   LAB with RD LAB   [facility] ([facility])    [address] [place] [address]  [facility] [address] [phone]           Please do not eat 10-12 hours before your appointment if you are coming in fasting for labs on lipids, cholesterol, or glucose (sugar). This does not apply to pregnant women. Water, hot tea and black coffee (with nothing added) are okay. Do not drink other fluids, diet soda or chew gum.            Jan 11, 2018 11:45 AM CST   (Arrive by 11:30 AM)   KRISTEN HORVATH SINGLE with [email or web address]   [facility] Ultrasound - [place] ([facility])    [address]  [facility] [address] [phone]            Jan 11, 2018 12:15 PM CST   Radiology MD with UR KRISTEN MILLARD   [facility] - [facility])    [address]  [facility] [address]   [phone]           Please arrive at the time given for your first appointment. This visit is used internally to schedule the physician's time during your ultrasound.            Jan 11, 2018  3:00 PM CST   ESTABLISHED PRENATAL with Chika Phillips MD   [facility] ([facility])    [address] [facility] [address] [phone]            Jan 12, 2018   Procedure with  Urmila Cuevas MD   [facility] (--)    [address]  [place] [address] [phone]              Who to contact     If you have questions or need follow up information about today's clinic visit or your schedule please contact [facility] [place] directly at [phone].  Normal or non-critical lab and imaging results will be communicated to you by MyChart, letter or phone within 4 business days after the clinic has received the results. If you do not hear from us within 7 days, please contact the clinic through Matchhart or phone. If you have a critical or abnormal lab result, we will notify you by phone as soon as possible.  Submit refill requests through Envoy or call your pharmacy and they will forward the refill request to us. Please allow 3 business days for your refill to be completed.          Additional Information About Your Visit        Matchhart Information     Envoy gives you secure access to your electronic health record. If you see a primary care provider, you can also send messages to your care team and make appointments. If you have questions, please call your primary care clinic.  If you do not have a primary care provider, please call [phone] and they will assist you.        Care EveryWhere ID     This is your Care EveryWhere ID. This could be used by other organizations to access your [facility] medical records  [id number]        Your Vitals Were     Last Period                   (LMP Unknown)            Blood Pressure from Last 3 Encounters:   01/04/18 121/63   12/27/17 118/64   12/18/17 110/68    Weight from Last 3 Encounters:   01/04/18 83 kg (183 lb)   12/27/17 82.6 kg (182 lb)   12/18/17 82.9 kg (182 lb 12.8 oz)              Today, you had the following     No orders found for display       Primary Care Provider Office Phone # Fax #    Kendy Bolden [phone] [address] [facility] [address] [facility] [address]         Equal Access to Services     [facility] : Hadii aad ku hadreeghanshyam Bentley, waxavierjose valleessieha, qalinanilesh alegrealkenia barrios. So [facility] [phone].    ATENCIÓN: Si habla español, tiene a tejeda disposición servicios gratuitos de asistencia lingüística. Llame al [phone].    We comply with applicable federal civil rights laws and [place] laws. We do not discriminate on the basis of race, color, national origin, age, disability, sex, sexual orientation, or gender identity.            Thank you!     Thank you for choosing [facility]  for your care. Our goal is always to provide you with excellent care. Hearing back from our patients is one way we can continue to improve our services. Please take a few minutes to complete the written survey that you may receive in the mail after your visit with us. Thank you!             Your Updated Medication List - Protect others around you: Learn how to safely use, store and throw away your medicines at www.disposemymeds.org.          This list is accurate as of: 1/5/18 11:59 PM.  Always use your most recent med list.                   Brand Name Dispense Instructions for use Diagnosis    prenatal multivitamin plus iron 27-0.8 MG Tabs per tablet      Take 1 tablet by mouth daily

## 2018-01-08 DIAGNOSIS — Z01.818 PRE-OP EXAM: Primary | ICD-10-CM

## 2018-01-08 DIAGNOSIS — O34.219 PREVIOUS CESAREAN DELIVERY, ANTEPARTUM CONDITION OR COMPLICATION: ICD-10-CM

## 2018-01-09 ENCOUNTER — ANESTHESIA EVENT (OUTPATIENT)
Dept: OBGYN | Facility: CLINIC | Age: 33
End: 2018-01-09
Payer: COMMERCIAL

## 2018-01-09 DIAGNOSIS — O34.219 PREVIOUS CESAREAN DELIVERY, ANTEPARTUM CONDITION OR COMPLICATION: ICD-10-CM

## 2018-01-09 DIAGNOSIS — Z01.818 PRE-OP EXAM: ICD-10-CM

## 2018-01-09 LAB
ABO + RH BLD: NORMAL
ABO + RH BLD: NORMAL
BLD GP AB SCN SERPL QL: NORMAL
BLOOD BANK CMNT PATIENT-IMP: NORMAL
ERYTHROCYTE [DISTWIDTH] IN BLOOD BY AUTOMATED COUNT: 13.7 % (ref 10–15)
HCT VFR BLD AUTO: 38.8 % (ref 35–47)
HGB BLD-MCNC: 13 G/DL (ref 11.7–15.7)
MCH RBC QN AUTO: 30 PG (ref 26.5–33)
MCHC RBC AUTO-ENTMCNC: 33.5 G/DL (ref 31.5–36.5)
MCV RBC AUTO: 89 FL (ref 78–100)
PLATELET # BLD AUTO: 157 10E9/L (ref 150–450)
RBC # BLD AUTO: 4.34 10E12/L (ref 3.8–5.2)
SPECIMEN EXP DATE BLD: NORMAL
WBC # BLD AUTO: 10.3 10E9/L (ref 4–11)

## 2018-01-09 PROCEDURE — 86780 TREPONEMA PALLIDUM: CPT | Performed by: OBSTETRICS & GYNECOLOGY

## 2018-01-09 PROCEDURE — 36415 COLL VENOUS BLD VENIPUNCTURE: CPT | Performed by: OBSTETRICS & GYNECOLOGY

## 2018-01-09 PROCEDURE — 86900 BLOOD TYPING SEROLOGIC ABO: CPT | Performed by: OBSTETRICS & GYNECOLOGY

## 2018-01-09 PROCEDURE — 86901 BLOOD TYPING SEROLOGIC RH(D): CPT | Performed by: OBSTETRICS & GYNECOLOGY

## 2018-01-09 PROCEDURE — 85027 COMPLETE CBC AUTOMATED: CPT | Performed by: OBSTETRICS & GYNECOLOGY

## 2018-01-09 PROCEDURE — 86850 RBC ANTIBODY SCREEN: CPT | Performed by: OBSTETRICS & GYNECOLOGY

## 2018-01-09 NOTE — ANESTHESIA PREPROCEDURE EVALUATION
Anesthesia Evaluation     . Pt has had prior anesthetic. Type: Regional    No history of anesthetic complications          ROS/MED HX    ENT/Pulmonary:     (+)Intermittent asthma (Related to cold, has not used inhaler for >1 year. No current symptoms) , . .    Neurologic:       Cardiovascular:  - neg cardiovascular ROS       METS/Exercise Tolerance:     Hematologic:  - neg hematologic  ROS       Musculoskeletal:  - neg musculoskeletal ROS       GI/Hepatic:  - neg GI/hepatic ROS       Renal/Genitourinary:         Endo:  - neg endo ROS       Psychiatric:     (+) psychiatric history anxiety and depression      Infectious Disease:         Malignancy:         Other: Comment:   - at 39w  -Prior C/S done emergently with epidural for failure to progress                        Procedure: Procedure(s):  Repeat  Section  - Wound Class:     HPI: Sandra Steve is a 32 year old female who presents for above procedure.     PMHx/PSHx:  Past Medical History:   Diagnosis Date     Anxiety      Asthma      Chickenpox      Depression        Past Surgical History:   Procedure Laterality Date     C/SECTION, LOW TRANSVERSE  2015         No current facility-administered medications on file prior to encounter.   Current Outpatient Prescriptions on File Prior to Encounter:  Prenatal Vit-Fe Fumarate-FA (PRENATAL MULTIVITAMIN  PLUS IRON) 27-0.8 MG TABS per tablet Take 1 tablet by mouth daily       Social Hx:   Social History   Substance Use Topics     Smoking status: Never Smoker     Smokeless tobacco: Never Used     Alcohol use Yes      Comment: 1 drink weekly-quit with pregnancy       Allergies: No Known Allergies      NPO Status: Per ASA Guidelines    Labs:    Blood Bank:  Lab Results   Component Value Date    ABO O 07/10/2017    RH  Pos 07/10/2017    AS Neg 07/10/2017     BMP:  No results for input(s): NA, POTASSIUM, CHLORIDE, CO2, BUN, CR, GLC, LAW in the last 37783 hours.  CBC:   Recent Labs   Lab Test  18   1225    WBC  10.3   RBC  4.34   HGB  13.0   HCT  38.8   MCV  89   MCH  30.0   MCHC  33.5   RDW  13.7   PLT  157     Coags:  No results for input(s): INR, PTT, FIBR in the last 33537 hours.        Physical Exam  Normal systems: cardiovascular and pulmonary    Airway   Mallampati: III  TM distance: >3 FB  Neck ROM: full    Dental     Cardiovascular   Rhythm and rate: regular and normal      Pulmonary    breath sounds clear to auscultation                    Anesthesia Plan      History & Physical Review  History and physical reviewed and following examination; no interval change.    ASA Status:  2 .        Plan for Spinal   PONV prophylaxis:  Ondansetron (or other 5HT-3)    -Consented to C/S under spinal and TAP blocks post op. Discussed risks/benefits of procedure, agreed to proceed.           Postoperative Care  Postoperative pain management:  Multi-modal analgesia, Neuraxial analgesia and IV analgesics.      Consents  Anesthetic plan, risks, benefits and alternatives discussed with:  Patient.  Use of blood products discussed: Yes.   Use of blood products discussed with Patient.  Consented to blood products.  .      Desiree Wade MD CA-1

## 2018-01-10 LAB — T PALLIDUM IGG+IGM SER QL: NEGATIVE

## 2018-01-11 ENCOUNTER — HOSPITAL ENCOUNTER (OUTPATIENT)
Dept: ULTRASOUND IMAGING | Facility: CLINIC | Age: 33
Discharge: HOME | End: 2018-01-11
Attending: OBSTETRICS & GYNECOLOGY | Admitting: OBSTETRICS & GYNECOLOGY
Payer: COMMERCIAL

## 2018-01-11 ENCOUNTER — OFFICE VISIT (OUTPATIENT)
Dept: MATERNAL FETAL MEDICINE | Facility: CLINIC | Age: 33
End: 2018-01-11
Attending: OBSTETRICS & GYNECOLOGY
Payer: COMMERCIAL

## 2018-01-11 ENCOUNTER — PRENATAL OFFICE VISIT (OUTPATIENT)
Dept: OBGYN | Facility: CLINIC | Age: 33
End: 2018-01-11
Payer: COMMERCIAL

## 2018-01-11 VITALS
HEIGHT: 63 IN | WEIGHT: 184.6 LBS | BODY MASS INDEX: 32.71 KG/M2 | TEMPERATURE: 97.6 F | HEART RATE: 83 BPM | SYSTOLIC BLOOD PRESSURE: 110 MMHG | DIASTOLIC BLOOD PRESSURE: 69 MMHG

## 2018-01-11 DIAGNOSIS — O35.HXX0 CLUB FOOT OF FETUS AFFECTING ANTEPARTUM CARE OF MOTHER, SINGLE OR UNSPECIFIED FETUS: Primary | ICD-10-CM

## 2018-01-11 DIAGNOSIS — Z98.891 H/O: C-SECTION: ICD-10-CM

## 2018-01-11 DIAGNOSIS — O35.9XX0 SUSPECTED FETAL ANOMALY, ANTEPARTUM, SINGLE OR UNSPECIFIED FETUS: ICD-10-CM

## 2018-01-11 DIAGNOSIS — Z34.83 PRENATAL CARE, SUBSEQUENT PREGNANCY, THIRD TRIMESTER: Primary | ICD-10-CM

## 2018-01-11 DIAGNOSIS — O35.HXX0 CLUB FOOT OF FETUS AFFECTING ANTEPARTUM CARE OF MOTHER, SINGLE OR UNSPECIFIED FETUS: ICD-10-CM

## 2018-01-11 PROCEDURE — 59426 ANTEPARTUM CARE ONLY: CPT | Performed by: OBSTETRICS & GYNECOLOGY

## 2018-01-11 PROCEDURE — 99207 ZZC PRENATAL VISIT: CPT | Performed by: OBSTETRICS & GYNECOLOGY

## 2018-01-11 PROCEDURE — 76819 FETAL BIOPHYS PROFIL W/O NST: CPT

## 2018-01-11 NOTE — MR AVS SNAPSHOT
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 of mother, single or unspecified fetus    -  1    Suspected fetal anomaly, antepartum, single or unspecified fetus        H/O:            Follow-ups after your visit        Your next 10 appointments already scheduled     2018  3:00 PM CST   ESTABLISHED PRENATAL with Chika Phillips MD   [facility]    [address] [facility] [place] [address] [phone]            2018   Procedure with Urmila Cuevas MD   [facility] (--)    [address]  [place] [address] [phone]              Who to contact     If you have questions or need follow up information about today's clinic visit or your schedule please contact [facility] directly at [phone].  Normal or non-critical lab and imaging results will be communicated to you by MyChart, letter or phone within 4 business days after the clinic has received the results. If you do not hear from us within 7 days, please contact the clinic through Alcrestahart or phone. If you have a critical or abnormal lab result, we will notify you by phone as soon as possible.  Submit refill requests through Theatro or call your pharmacy and they will forward the refill request to us. Please allow 3 business days for your refill to be completed.          Additional Information About Your Visit        MyChart Information     Theatro gives you secure access to your electronic health record. If you see a primary care provider, you can also send messages to your care team and make appointments. If you have questions,  please call your primary care clinic.  If you do not have a primary care provider, please call [phone] and they will assist you.        Care EveryWhere ID     This is your Care EveryWhere ID. This could be used by other organizations to access your [facility] medical records  [id number]        Your Vitals Were     Last Period                   (LMP Unknown)            Blood Pressure from Last 3 Encounters:   01/04/18 121/63   12/27/17 118/64   12/18/17 110/68    Weight from Last 3 Encounters:   01/04/18 83 kg (183 lb)   12/27/17 82.6 kg (182 lb)   12/18/17 82.9 kg (182 lb 12.8 oz)              Today, you had the following     No orders found for display       Primary Care Provider Office Phone # Fax #    Kendy Bolden [phone]       [address] [facility] [address] [facility]  [facility] [address]        Equal Access to Services     LISA FRASER : Hadii aad ku hadasho Soomaali, waaxda luqadaha, qaybta kaalmada adeegyada, waxay idiin hayaan nadja khgeena tapia . So [facility] [phone].    ATENCIÓN: Si raminla espjeffrey, tiene a tejeda disposición servicios gratuitos de asistencia lingüística. Llame al [phone].    We comply with applicable federal civil rights laws and [place] laws. We do not discriminate on the basis of race, color, national origin, age, disability, sex, sexual orientation, or gender identity.            Thank you!     Thank you for choosing [facility]  for your care. Our goal is always to provide you with excellent care. Hearing back from our patients is one way we can continue to improve our services. Please take a few minutes to complete the written survey that you may receive in the mail after your visit with us. Thank you!             Your Updated Medication List - Protect others around you: Learn how to safely use, store and throw away your medicines at www.disposemymeds.org.          This list is accurate as of: 1/11/18 12:28 PM.  Always  use your most recent med list.                   Brand Name Dispense Instructions for use Diagnosis    prenatal multivitamin plus iron 27-0.8 MG Tabs per tablet      Take 1 tablet by mouth daily

## 2018-01-11 NOTE — MR AVS SNAPSHOT
After Visit Summary   1/11/2018    Sandra Steve    MRN: [id number]           Patient Information     Date Of Birth          1985        Visit Information        Provider Department      1/11/2018 3:00 PM Chika Phillips MD [facility]        Today's Diagnoses     Prenatal care, subsequent pregnancy, third trimester    -  1       Follow-ups after your visit        Your next 10 appointments already scheduled     Jan 12, 2018   Procedure with Urmila Cuevas MD   [facility] (--)    [address] [facility]  [facility] [address] [phone]              Who to contact     If you have questions or need follow up information about today's clinic visit or your schedule please contact [facility] directly at [phone].  Normal or non-critical lab and imaging results will be communicated to you by MyChart, letter or phone within 4 business days after the clinic has received the results. If you do not hear from us within 7 days, please contact the clinic through MyChart or phone. If you have a critical or abnormal lab result, we will notify you by phone as soon as possible.  Submit refill requests through [place] [employer] or call your pharmacy and they will forward the refill request to us. Please allow 3 business days for your refill to be completed.          Additional Information About Your Visit        MyChart Information     [employer] gives you secure access to your electronic health record. If you see a primary care provider, you can also send messages to your care team and make appointments. If you have questions, please call your primary care clinic.  If you do not have a primary care provider, please call [phone] and they will assist you.        Care EveryWhere ID     This is your Care EveryWhere ID. This could be used by other organizations to access your [facility] medical records  [id number]        Your Vitals Were     Pulse Temperature Height Last Period Breastfeeding?  "BMI (Body Mass Index)    83 97.6  F (36.4  C) (Tympanic) 5' 3\" (1.6 m) (LMP Unknown) No 32.7 kg/m2       Blood Pressure from Last 3 Encounters:   01/11/18 110/69   01/04/18 121/63   12/27/17 118/64    Weight from Last 3 Encounters:   01/11/18 184 lb 9.6 oz (83.7 kg)   01/04/18 183 lb (83 kg)   12/27/17 182 lb (82.6 kg)              Today, you had the following     No orders found for display       Primary Care Provider Office Phone # Fax #    Kendy Bolden [phone]       [address] [facility] [address] [facility] [address]        Equal Access to Services     LISA FRASER : Hadii aad ku hadasho Soliam, waaxda luqadaha, qaybta kaalmada adeegyada, kenia tapia . So [facility] [phone].    ATENCIÓN: Si habla español, tiene a tejeda disposición servicios gratuitos de asistencia lingüística. Rock al [phone].    We comply with applicable federal civil rights laws and [place] laws. We do not discriminate on the basis of race, color, national origin, age, disability, sex, sexual orientation, or gender identity.            Thank you!     Thank you for choosing [facility]  for your care. Our goal is always to provide you with excellent care. Hearing back from our patients is one way we can continue to improve our services. Please take a few minutes to complete the written survey that you may receive in the mail after your visit with us. Thank you!             Your Updated Medication List - Protect others around you: Learn how to safely use, store and throw away your medicines at www.disposemymeds.org.          This list is accurate as of: 1/11/18  3:32 PM.  Always use your most recent med list.                   Brand Name Dispense Instructions for use Diagnosis    prenatal multivitamin plus iron 27-0.8 MG Tabs per tablet      Take 1 tablet by mouth daily          "

## 2018-01-11 NOTE — NURSING NOTE
"Chief Complaint   Patient presents with     Prenatal Care       Initial /69 (BP Location: Left arm, Patient Position: Chair, Cuff Size: Adult Large)  Pulse 83  Temp 97.6  F (36.4  C) (Tympanic)  Ht 5' 3\" (1.6 m)  Wt 184 lb 9.6 oz (83.7 kg)  LMP  (LMP Unknown)  Breastfeeding? No  BMI 32.7 kg/m2 Estimated body mass index is 32.7 kg/(m^2) as calculated from the following:    Height as of this encounter: 5' 3\" (1.6 m).    Weight as of this encounter: 184 lb 9.6 oz (83.7 kg).  Medication Reconciliation: complete   Shonna Khan CMA      "

## 2018-01-11 NOTE — PROGRESS NOTES
"CC: Here for routine prenatal visit @ 38w6d   HPI: + FM, no ctx, no LOF, no VB.  No complaints.     PE: /69 (BP Location: Left arm, Patient Position: Chair, Cuff Size: Adult Large)  Pulse 83  Temp 97.6  F (36.4  C) (Tympanic)  Ht 5' 3\" (1.6 m)  Wt 184 lb 9.6 oz (83.7 kg)  LMP  (LMP Unknown)  Breastfeeding? No  BMI 32.7 kg/m2   See OB flowsheet    BPP     A/P  @ 38w6d normal pregnancy    1. Routine prenatal care    RTC 6 week postpartum    Chika Phillips M.D.      PRE-OP EVALUATION:  Today's date: 2018    Sandra Steve (: 1985) presents for pre-operative evaluation assessment as requested by Dr. Cuevas.  She requires evaluation and anesthesia risk assessment prior to undergoing surgery/procedure for treatment of prior  .  Proposed procedure: repeat     Date of Surgery/ Procedure: 18  Time of Surgery/ Procedure: 1030  Hospital/Surgical Facility: [facility]  Primary Physician: Chika Phillips M.D.   Type of Anesthesia Anticipated: Spinal    Patient has a Health Care Directive or Living Will:  NO    1. NO - Do you have a history of heart attack, stroke, stent, bypass or surgery on an artery in the head, neck, heart or legs?  2. NO - Do you ever have any pain or discomfort in your chest?  3. NO - Do you have a history of  Heart Failure?  4. NO - Are you troubled by shortness of breath when: walking on the level, up a slight hill or at night?  5. NO - Do you currently have a cold, bronchitis or other respiratory infection?  6. NO - Do you have a cough, shortness of breath or wheezing?  7. NO - Do you sometimes get pains in the calves of your legs when you walk?  8. YES - DO YOU OR ANYONE IN YOUR FAMILY HAVE PREVIOUS HISTORY OF BLOOD CLOTS? Grandparents are on blood thinners  9. NO - Do you or does anyone in your family have a serious bleeding problem such as prolonged bleeding following surgeries or cuts?  10. NO - Have you ever had problems with anemia or been " told to take iron pills?  11. NO - Have you had any abnormal blood loss such as black, tarry or bloody stools, or abnormal vaginal bleeding?  12. NO - Have you ever had a blood transfusion?  13. NO - Have you or any of your relatives ever had problems with anesthesia?  14. NO - Do you have sleep apnea, excessive snoring or daytime drowsiness?  15. NO - Do you have any prosthetic heart valves?  16. NO - Do you have prosthetic joints?  17. YES - IS THERE ANY CHANCE THAT YOU MAY BE PREGNANT? current        HPI:                                                      Brief HPI related to upcoming procedure: 33 yo  @ 38w6d planning on repeat  at [place] due to  concerns      See problem list for active medical problems.  Problems all longstanding and stable, except as noted/documented.  See ROS for pertinent symptoms related to these conditions.                                                                                                  .    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 mother 2017     Priority: Medium     H/O:  07/10/2017     Priority: Medium     Plan repeat  @ 39 weeks        Past Medical History:   Diagnosis Date     Anxiety      Asthma      Chickenpox      Depression      Past Surgical History:   Procedure Laterality Date     C/SECTION, LOW TRANSVERSE  2015     Current Outpatient Prescriptions   Medication Sig Dispense Refill     Prenatal Vit-Fe Fumarate-FA (PRENATAL MULTIVITAMIN  PLUS IRON) 27-0.8 MG TABS per tablet Take 1  "tablet by mouth daily       OTC products: None, except as noted above    No Known Allergies   Latex Allergy: NO    Social History   Substance Use Topics     Smoking status: Never Smoker     Smokeless tobacco: Never Used     Alcohol use Yes      Comment: 1 drink weekly-quit with pregnancy     History   Drug Use No       REVIEW OF SYSTEMS:                                                    C: NEGATIVE for fever, chills, change in weight  E/M: NEGATIVE for ear, mouth and throat problems  R: NEGATIVE for significant cough or SOB  CV: NEGATIVE for chest pain, palpitations or peripheral edema    EXAM:                                                    /69 (BP Location: Left arm, Patient Position: Chair, Cuff Size: Adult Large)  Pulse 83  Temp 97.6  F (36.4  C) (Tympanic)  Ht 5' 3\" (1.6 m)  Wt 184 lb 9.6 oz (83.7 kg)  LMP  (LMP Unknown)  Breastfeeding? No  BMI 32.7 kg/m2  GENERAL APPEARANCE: healthy, alert and no distress  RESP: lungs clear to auscultation - no rales, rhonchi or wheezes  CV: regular rate and rhythm, normal S1 S2, no S3 or S4 and no murmur, click or rub   ABDOMEN: soft, nontender, no HSM or masses and bowel sounds normal  NEURO: Normal strength and tone, sensory exam grossly normal, mentation intact and speech normal    DIAGNOSTICS:                                                    Hemoglobin (indicated for history of anemia or procedure with significant blood loss such as tonsillectomy, major intraperitoneal surgery, vascular surgery, major spine surgery, total joint replacement)    Recent Labs   Lab Test  18   1225  10/06/17   1537  07/10/17   1611   HGB  13.0  12.6  13.2   PLT  157   --   202        IMPRESSION:                                                    Reason for surgery/procedure: prior  section    The proposed surgical procedure is considered INTERMEDIATE risk.    REVISED CARDIAC RISK INDEX  The patient has the following serious cardiovascular risks for " perioperative complications such as (MI, PE, VFib and 3  AV Block):  No serious cardiac risks  INTERPRETATION: 1 risks: Class II (low risk - 0.9% complication rate)    The patient has the following additional risks for perioperative complications:  No identified additional risks      ICD-10-CM    1. Prenatal care, subsequent pregnancy, third trimester Z34.83        RECOMMENDATIONS:                                                          --Patient is to take all scheduled medications on the day of surgery EXCEPT for modifications listed below.    APPROVAL GIVEN to proceed with proposed procedure, without further diagnostic evaluation       Signed Electronically by: Chika Phillips MD    Copy of this evaluation report is provided to requesting physician.    [place] Preop Guidelines

## 2018-01-11 NOTE — H&P
[facility]  OB History and Physical      Sandra Steve MRN# [id number]   Age: 32 year old YOB: 1985     CC:  scheduled  section delivery    HPI:  Ms. Sandra Steve is a 32 year old  at 30w0d by 7w4d US, LMP unknown, who presents for scheduled  section delivery.  She very sporadic contractions but no vaginal bleeding or loss of fluid. She reports regular fetal movement. No recent illnesses, headache/vision changes, abdominal pain.    Pregnancy Complications:  1. H/O   2. Bilateral club feet and clenched fists, normal Progenity and SMA testing  3. Asthma (only related to URI, no inhaler use in >1yr)  4. Anxiety    Prenatal Labs:   Lab Results   Component Value Date    ABO O 2018    RH Pos 2018    AS Neg 2018    HEPBANG Nonreactive 07/10/2017    TREPAB Negative 2018    HGB 13.0 2018     GBS Status:   Lab Results   Component Value Date    GBS Negative 2017     Ultrasounds (selected)  1. Dating 7w4d US (previous 5w5d as well)  2. Anatomy scan: 20+4, small choroid plexus cyst  3. Anatomy scan: 21+3, b/l clenched fists and clubbed feet, otherwise normal with no choroid plexus cyst  4. Growth (most recent): 17 36w6d 8lb5oz 92nd %ile, b/l clenched fists and clubbed feet  OB History  Obstetric History       T1      L1     SAB0   TAB0   Ectopic0   Multiple0   Live Births1       # Outcome Date GA Lbr Donald/2nd Weight Sex Delivery Anes PTL Lv   2 Current            1 Term 08/05/15 40w0d  3.402 kg (7 lb 8 oz)  CS-LTranv   MAGDALENA      Name: Zen        GYN History  PID after IUD insertion    PMHx:   Past Medical History:   Diagnosis Date     Anxiety      Asthma      Chickenpox      Depression      PSHx:   Past Surgical History:   Procedure Laterality Date     C/SECTION, LOW TRANSVERSE  2015     Meds:   Prescriptions Prior to Admission   Medication Sig Dispense Refill Last Dose     Prenatal Vit-Fe  "Fumarate-FA (PRENATAL MULTIVITAMIN  PLUS IRON) 27-0.8 MG TABS per tablet Take 1 tablet by mouth daily   Past Week at Unknown time     Allergies:  No Known Allergies   FmHx:   Family History   Problem Relation Age of Onset     Substance Abuse Father      recovered alcohol     Depression Father      Depression Brother      Emphysema Maternal Grandfather      CEREBROVASCULAR DISEASE Paternal Grandmother      DIABETES Paternal Grandfather      SocHx: She denies any tobacco, alcohol, or other drug use during this pregnancy.    ROS: Complete 10-point ROS negative except as noted in HPI.  She denies headache, vision changes, chest pain, shortness of breath, RUQ pain, nausea, vomiting, dysuria, or extremity edema.    PE:  Vit:   Patient Vitals for the past 4 hrs:   BP Temp Temp src Pulse Height Weight   18 0815 114/70 98.2  F (36.8  C) Oral 95 - -   18 0804 - - - - 1.6 m (5' 3\") 83.9 kg (185 lb)      Gen: Well-appearing, NAD, comfortable   Pulm: Breathing comfortably on room air  Abd: Soft, gravid, non-tender  Ext: Minimal LE edema b/l    Pres:  ceph by Leopold's   EFW:  9lb-9.5lb by ultrasound extrapolation based on 36w6d ultrasound,  c/w Leopold's               FHT: Baseline 145, moderate variability, present accelerations, no decelerations   Branch: Sporadic contractions at home, none today    Assessment  Ms. Sandra Steve is a 32 year old , at 39w0d, who presents for scheduled  section.    Plan  1. Admit to L&D  2. Preop: CBC, type and screen, anti-Treponema drawn 1/10/17. Hgb 13.0. Plt 157. NPO/IVF.  3. Fetus: reactive NST.  Continue EFM and toco.  Placenta anterior.  Bilateral club feet and clenched fists.  4. Rh positive  5. Pain:  Spinal anesthesia anticipated.  6. Routine Prenatal:  1. Rubella immune  2. GBS negative  3. GCT 90 (normal)    The patient was discussed with Dr. Cuevas who is in agreement with the treatment plan.    Mona Alvarez MD  OBGYN PGY-2  [phone] (OB PGY2 " Pager)  9:19 PM 1/10/2018      NST reactive and no questions about .  Will proceed. NICU for delivery due to fetal feet and hands seen on ultrasound.  Urmila Cuevas MD

## 2018-01-12 ENCOUNTER — HOSPITAL ENCOUNTER (INPATIENT)
Facility: CLINIC | Age: 33
LOS: 3 days | Discharge: HOME | End: 2018-01-15
Attending: OBSTETRICS & GYNECOLOGY | Admitting: OBSTETRICS & GYNECOLOGY
Payer: COMMERCIAL

## 2018-01-12 ENCOUNTER — ANESTHESIA (OUTPATIENT)
Dept: OBGYN | Facility: CLINIC | Age: 33
End: 2018-01-12
Payer: COMMERCIAL

## 2018-01-12 ENCOUNTER — SURGERY (OUTPATIENT)
Age: 33
End: 2018-01-12

## 2018-01-12 DIAGNOSIS — Z98.891 S/P CESAREAN SECTION: Primary | ICD-10-CM

## 2018-01-12 PROBLEM — O34.219 PREVIOUS CESAREAN DELIVERY AFFECTING PREGNANCY: Status: ACTIVE | Noted: 2018-01-12

## 2018-01-12 PROCEDURE — 37000009 ZZH ANESTHESIA TECHNICAL FEE, EACH ADDTL 15 MIN: Performed by: OBSTETRICS & GYNECOLOGY

## 2018-01-12 PROCEDURE — 27210794 ZZH OR GENERAL SUPPLY STERILE: Performed by: OBSTETRICS & GYNECOLOGY

## 2018-01-12 PROCEDURE — 25000128 H RX IP 250 OP 636: Performed by: STUDENT IN AN ORGANIZED HEALTH CARE EDUCATION/TRAINING PROGRAM

## 2018-01-12 PROCEDURE — 71000014 ZZH RECOVERY PHASE 1 LEVEL 2 FIRST HR: Performed by: OBSTETRICS & GYNECOLOGY

## 2018-01-12 PROCEDURE — 25000128 H RX IP 250 OP 636: Performed by: OBSTETRICS & GYNECOLOGY

## 2018-01-12 PROCEDURE — 36000057 ZZH SURGERY LEVEL 3 1ST 30 MIN - UMMC: Performed by: OBSTETRICS & GYNECOLOGY

## 2018-01-12 PROCEDURE — 25000125 ZZHC RX 250: Performed by: STUDENT IN AN ORGANIZED HEALTH CARE EDUCATION/TRAINING PROGRAM

## 2018-01-12 PROCEDURE — C1765 ADHESION BARRIER: HCPCS | Performed by: OBSTETRICS & GYNECOLOGY

## 2018-01-12 PROCEDURE — 71000015 ZZH RECOVERY PHASE 1 LEVEL 2 EA ADDTL HR: Performed by: OBSTETRICS & GYNECOLOGY

## 2018-01-12 PROCEDURE — 40000170 ZZH STATISTIC PRE-PROCEDURE ASSESSMENT II: Performed by: OBSTETRICS & GYNECOLOGY

## 2018-01-12 PROCEDURE — 36000059 ZZH SURGERY LEVEL 3 EA 15 ADDTL MIN UMMC: Performed by: OBSTETRICS & GYNECOLOGY

## 2018-01-12 PROCEDURE — 25000132 ZZH RX MED GY IP 250 OP 250 PS 637: Performed by: OBSTETRICS & GYNECOLOGY

## 2018-01-12 PROCEDURE — 37000008 ZZH ANESTHESIA TECHNICAL FEE, 1ST 30 MIN: Performed by: OBSTETRICS & GYNECOLOGY

## 2018-01-12 PROCEDURE — 59515 CESAREAN DELIVERY: CPT | Mod: GC | Performed by: OBSTETRICS & GYNECOLOGY

## 2018-01-12 PROCEDURE — 12000032 ZZH R&B OB CRITICAL UMMC

## 2018-01-12 PROCEDURE — 25000125 ZZHC RX 250: Performed by: OBSTETRICS & GYNECOLOGY

## 2018-01-12 RX ORDER — LIDOCAINE 40 MG/G
CREAM TOPICAL
Status: DISCONTINUED | OUTPATIENT
Start: 2018-01-12 | End: 2018-01-12

## 2018-01-12 RX ORDER — DIPHENHYDRAMINE HCL 25 MG
25 CAPSULE ORAL EVERY 6 HOURS PRN
Status: DISCONTINUED | OUTPATIENT
Start: 2018-01-12 | End: 2018-01-15 | Stop reason: HOSPADM

## 2018-01-12 RX ORDER — KETOROLAC TROMETHAMINE 30 MG/ML
INJECTION, SOLUTION INTRAMUSCULAR; INTRAVENOUS PRN
Status: DISCONTINUED | OUTPATIENT
Start: 2018-01-12 | End: 2018-01-12

## 2018-01-12 RX ORDER — IBUPROFEN 800 MG/1
800 TABLET, FILM COATED ORAL EVERY 6 HOURS PRN
Status: DISCONTINUED | OUTPATIENT
Start: 2018-01-12 | End: 2018-01-15 | Stop reason: HOSPADM

## 2018-01-12 RX ORDER — HYDROCORTISONE 2.5 %
CREAM (GRAM) TOPICAL 3 TIMES DAILY PRN
Status: DISCONTINUED | OUTPATIENT
Start: 2018-01-12 | End: 2018-01-15 | Stop reason: HOSPADM

## 2018-01-12 RX ORDER — SODIUM CHLORIDE, SODIUM LACTATE, POTASSIUM CHLORIDE, CALCIUM CHLORIDE 600; 310; 30; 20 MG/100ML; MG/100ML; MG/100ML; MG/100ML
INJECTION, SOLUTION INTRAVENOUS CONTINUOUS PRN
Status: DISCONTINUED | OUTPATIENT
Start: 2018-01-12 | End: 2018-01-12

## 2018-01-12 RX ORDER — OXYTOCIN/0.9 % SODIUM CHLORIDE 30/500 ML
340 PLASTIC BAG, INJECTION (ML) INTRAVENOUS CONTINUOUS PRN
Status: DISCONTINUED | OUTPATIENT
Start: 2018-01-12 | End: 2018-01-15 | Stop reason: HOSPADM

## 2018-01-12 RX ORDER — CEFAZOLIN SODIUM 2 G/100ML
2 INJECTION, SOLUTION INTRAVENOUS
Status: COMPLETED | OUTPATIENT
Start: 2018-01-12 | End: 2018-01-12

## 2018-01-12 RX ORDER — EPHEDRINE SULFATE 50 MG/ML
5 INJECTION, SOLUTION INTRAMUSCULAR; INTRAVENOUS; SUBCUTANEOUS
Status: DISCONTINUED | OUTPATIENT
Start: 2018-01-12 | End: 2018-01-12

## 2018-01-12 RX ORDER — NALOXONE HYDROCHLORIDE 0.4 MG/ML
.1-.4 INJECTION, SOLUTION INTRAMUSCULAR; INTRAVENOUS; SUBCUTANEOUS
Status: ACTIVE | OUTPATIENT
Start: 2018-01-12 | End: 2018-01-13

## 2018-01-12 RX ORDER — EPHEDRINE SULFATE 50 MG/ML
INJECTION, SOLUTION INTRAMUSCULAR; INTRAVENOUS; SUBCUTANEOUS PRN
Status: DISCONTINUED | OUTPATIENT
Start: 2018-01-12 | End: 2018-01-12

## 2018-01-12 RX ORDER — CARBOPROST TROMETHAMINE 250 UG/ML
250 INJECTION, SOLUTION INTRAMUSCULAR
Status: DISCONTINUED | OUTPATIENT
Start: 2018-01-12 | End: 2018-01-15 | Stop reason: HOSPADM

## 2018-01-12 RX ORDER — SIMETHICONE 80 MG
80 TABLET,CHEWABLE ORAL 4 TIMES DAILY PRN
Status: DISCONTINUED | OUTPATIENT
Start: 2018-01-12 | End: 2018-01-15 | Stop reason: HOSPADM

## 2018-01-12 RX ORDER — CEFAZOLIN SODIUM 1 G
1 VIAL (EA) INJECTION SEE ADMIN INSTRUCTIONS
Status: DISCONTINUED | OUTPATIENT
Start: 2018-01-12 | End: 2018-01-12

## 2018-01-12 RX ORDER — AMOXICILLIN 250 MG
1 CAPSULE ORAL 2 TIMES DAILY PRN
Status: DISCONTINUED | OUTPATIENT
Start: 2018-01-12 | End: 2018-01-15 | Stop reason: HOSPADM

## 2018-01-12 RX ORDER — METHYLERGONOVINE MALEATE 0.2 MG/ML
200 INJECTION INTRAVENOUS
Status: DISCONTINUED | OUTPATIENT
Start: 2018-01-12 | End: 2018-01-15 | Stop reason: HOSPADM

## 2018-01-12 RX ORDER — ONDANSETRON 2 MG/ML
4 INJECTION INTRAMUSCULAR; INTRAVENOUS EVERY 30 MIN PRN
Status: DISCONTINUED | OUTPATIENT
Start: 2018-01-12 | End: 2018-01-12

## 2018-01-12 RX ORDER — ONDANSETRON 2 MG/ML
4 INJECTION INTRAMUSCULAR; INTRAVENOUS EVERY 6 HOURS PRN
Status: DISCONTINUED | OUTPATIENT
Start: 2018-01-12 | End: 2018-01-15 | Stop reason: HOSPADM

## 2018-01-12 RX ORDER — ACETAMINOPHEN 325 MG/1
650 TABLET ORAL EVERY 4 HOURS PRN
Status: DISCONTINUED | OUTPATIENT
Start: 2018-01-15 | End: 2018-01-15 | Stop reason: HOSPADM

## 2018-01-12 RX ORDER — OXYTOCIN 10 [USP'U]/ML
10 INJECTION, SOLUTION INTRAMUSCULAR; INTRAVENOUS
Status: DISCONTINUED | OUTPATIENT
Start: 2018-01-12 | End: 2018-01-15 | Stop reason: HOSPADM

## 2018-01-12 RX ORDER — KETOROLAC TROMETHAMINE 30 MG/ML
30 INJECTION, SOLUTION INTRAMUSCULAR; INTRAVENOUS EVERY 6 HOURS
Status: DISPENSED | OUTPATIENT
Start: 2018-01-12 | End: 2018-01-13

## 2018-01-12 RX ORDER — OXYTOCIN/0.9 % SODIUM CHLORIDE 30/500 ML
PLASTIC BAG, INJECTION (ML) INTRAVENOUS CONTINUOUS PRN
Status: DISCONTINUED | OUTPATIENT
Start: 2018-01-12 | End: 2018-01-12

## 2018-01-12 RX ORDER — NALOXONE HYDROCHLORIDE 0.4 MG/ML
.1-.4 INJECTION, SOLUTION INTRAMUSCULAR; INTRAVENOUS; SUBCUTANEOUS
Status: DISCONTINUED | OUTPATIENT
Start: 2018-01-12 | End: 2018-01-12

## 2018-01-12 RX ORDER — MORPHINE SULFATE 1 MG/ML
INJECTION, SOLUTION EPIDURAL; INTRATHECAL; INTRAVENOUS PRN
Status: DISCONTINUED | OUTPATIENT
Start: 2018-01-12 | End: 2018-01-12

## 2018-01-12 RX ORDER — IBUPROFEN 600 MG/1
600 TABLET, FILM COATED ORAL EVERY 6 HOURS PRN
Status: DISCONTINUED | OUTPATIENT
Start: 2018-01-12 | End: 2018-01-15 | Stop reason: HOSPADM

## 2018-01-12 RX ORDER — SODIUM CHLORIDE, SODIUM LACTATE, POTASSIUM CHLORIDE, CALCIUM CHLORIDE 600; 310; 30; 20 MG/100ML; MG/100ML; MG/100ML; MG/100ML
INJECTION, SOLUTION INTRAVENOUS CONTINUOUS
Status: DISCONTINUED | OUTPATIENT
Start: 2018-01-12 | End: 2018-01-15 | Stop reason: HOSPADM

## 2018-01-12 RX ORDER — HYDROXYZINE HYDROCHLORIDE 25 MG/1
25 TABLET, FILM COATED ORAL EVERY 6 HOURS PRN
Status: DISCONTINUED | OUTPATIENT
Start: 2018-01-12 | End: 2018-01-15 | Stop reason: HOSPADM

## 2018-01-12 RX ORDER — FENTANYL CITRATE 50 UG/ML
25-50 INJECTION, SOLUTION INTRAMUSCULAR; INTRAVENOUS
Status: DISCONTINUED | OUTPATIENT
Start: 2018-01-12 | End: 2018-01-12

## 2018-01-12 RX ORDER — IBUPROFEN 400 MG/1
400 TABLET, FILM COATED ORAL EVERY 6 HOURS PRN
Status: DISCONTINUED | OUTPATIENT
Start: 2018-01-12 | End: 2018-01-15 | Stop reason: HOSPADM

## 2018-01-12 RX ORDER — CITRIC ACID/SODIUM CITRATE 334-500MG
30 SOLUTION, ORAL ORAL
Status: COMPLETED | OUTPATIENT
Start: 2018-01-12 | End: 2018-01-12

## 2018-01-12 RX ORDER — AMOXICILLIN 250 MG
2 CAPSULE ORAL 2 TIMES DAILY PRN
Status: DISCONTINUED | OUTPATIENT
Start: 2018-01-12 | End: 2018-01-15 | Stop reason: HOSPADM

## 2018-01-12 RX ORDER — BISACODYL 10 MG
10 SUPPOSITORY, RECTAL RECTAL DAILY PRN
Status: DISCONTINUED | OUTPATIENT
Start: 2018-01-14 | End: 2018-01-15 | Stop reason: HOSPADM

## 2018-01-12 RX ORDER — SODIUM CHLORIDE, SODIUM LACTATE, POTASSIUM CHLORIDE, CALCIUM CHLORIDE 600; 310; 30; 20 MG/100ML; MG/100ML; MG/100ML; MG/100ML
INJECTION, SOLUTION INTRAVENOUS CONTINUOUS
Status: DISCONTINUED | OUTPATIENT
Start: 2018-01-12 | End: 2018-01-12 | Stop reason: HOSPADM

## 2018-01-12 RX ORDER — MISOPROSTOL 200 UG/1
400 TABLET ORAL
Status: DISCONTINUED | OUTPATIENT
Start: 2018-01-12 | End: 2018-01-15 | Stop reason: HOSPADM

## 2018-01-12 RX ORDER — MORPHINE SULFATE 1 MG/ML
INJECTION, SOLUTION EPIDURAL; INTRATHECAL; INTRAVENOUS
Status: DISCONTINUED
Start: 2018-01-12 | End: 2018-01-12 | Stop reason: HOSPADM

## 2018-01-12 RX ORDER — MORPHINE SULFATE 1 MG/ML
200 INJECTION, SOLUTION EPIDURAL; INTRATHECAL; INTRAVENOUS ONCE
Status: DISCONTINUED | OUTPATIENT
Start: 2018-01-12 | End: 2018-01-12

## 2018-01-12 RX ORDER — OXYCODONE HYDROCHLORIDE 5 MG/1
5-10 TABLET ORAL
Status: DISCONTINUED | OUTPATIENT
Start: 2018-01-12 | End: 2018-01-15 | Stop reason: HOSPADM

## 2018-01-12 RX ORDER — DIPHENHYDRAMINE HYDROCHLORIDE 50 MG/ML
25 INJECTION INTRAMUSCULAR; INTRAVENOUS EVERY 6 HOURS PRN
Status: DISCONTINUED | OUTPATIENT
Start: 2018-01-12 | End: 2018-01-15 | Stop reason: HOSPADM

## 2018-01-12 RX ORDER — HYDROMORPHONE HYDROCHLORIDE 1 MG/ML
.3-.5 INJECTION, SOLUTION INTRAMUSCULAR; INTRAVENOUS; SUBCUTANEOUS EVERY 30 MIN PRN
Status: DISCONTINUED | OUTPATIENT
Start: 2018-01-12 | End: 2018-01-15 | Stop reason: HOSPADM

## 2018-01-12 RX ORDER — NALBUPHINE HYDROCHLORIDE 10 MG/ML
2.5-5 INJECTION, SOLUTION INTRAMUSCULAR; INTRAVENOUS; SUBCUTANEOUS EVERY 6 HOURS PRN
Status: DISCONTINUED | OUTPATIENT
Start: 2018-01-12 | End: 2018-01-12

## 2018-01-12 RX ORDER — ONDANSETRON 4 MG/1
4 TABLET, ORALLY DISINTEGRATING ORAL EVERY 30 MIN PRN
Status: DISCONTINUED | OUTPATIENT
Start: 2018-01-12 | End: 2018-01-12

## 2018-01-12 RX ORDER — ACETAMINOPHEN 325 MG/1
975 TABLET ORAL EVERY 8 HOURS
Status: COMPLETED | OUTPATIENT
Start: 2018-01-12 | End: 2018-01-15

## 2018-01-12 RX ORDER — LANOLIN 100 %
OINTMENT (GRAM) TOPICAL
Status: DISCONTINUED | OUTPATIENT
Start: 2018-01-12 | End: 2018-01-15 | Stop reason: HOSPADM

## 2018-01-12 RX ORDER — DEXTROSE, SODIUM CHLORIDE, SODIUM LACTATE, POTASSIUM CHLORIDE, AND CALCIUM CHLORIDE 5; .6; .31; .03; .02 G/100ML; G/100ML; G/100ML; G/100ML; G/100ML
INJECTION, SOLUTION INTRAVENOUS CONTINUOUS
Status: DISCONTINUED | OUTPATIENT
Start: 2018-01-12 | End: 2018-01-12

## 2018-01-12 RX ORDER — ONDANSETRON 2 MG/ML
INJECTION INTRAMUSCULAR; INTRAVENOUS PRN
Status: DISCONTINUED | OUTPATIENT
Start: 2018-01-12 | End: 2018-01-12

## 2018-01-12 RX ORDER — OXYTOCIN/0.9 % SODIUM CHLORIDE 30/500 ML
100 PLASTIC BAG, INJECTION (ML) INTRAVENOUS CONTINUOUS
Status: DISCONTINUED | OUTPATIENT
Start: 2018-01-12 | End: 2018-01-15 | Stop reason: HOSPADM

## 2018-01-12 RX ORDER — SODIUM CHLORIDE, SODIUM LACTATE, POTASSIUM CHLORIDE, CALCIUM CHLORIDE 600; 310; 30; 20 MG/100ML; MG/100ML; MG/100ML; MG/100ML
INJECTION, SOLUTION INTRAVENOUS CONTINUOUS
Status: DISCONTINUED | OUTPATIENT
Start: 2018-01-12 | End: 2018-01-12

## 2018-01-12 RX ORDER — BUPIVACAINE HYDROCHLORIDE 7.5 MG/ML
INJECTION, SOLUTION INTRASPINAL PRN
Status: DISCONTINUED | OUTPATIENT
Start: 2018-01-12 | End: 2018-01-12

## 2018-01-12 RX ADMIN — PHENYLEPHRINE HYDROCHLORIDE 100 MCG: 10 INJECTION, SOLUTION INTRAMUSCULAR; INTRAVENOUS; SUBCUTANEOUS at 11:10

## 2018-01-12 RX ADMIN — Medication 5 MG: at 11:20

## 2018-01-12 RX ADMIN — KETOROLAC TROMETHAMINE 30 MG: 30 INJECTION, SOLUTION INTRAMUSCULAR; INTRAVENOUS at 18:09

## 2018-01-12 RX ADMIN — CEFAZOLIN SODIUM 2 G: 2 INJECTION, SOLUTION INTRAVENOUS at 11:10

## 2018-01-12 RX ADMIN — PHENYLEPHRINE HYDROCHLORIDE 0.5 MCG/KG/MIN: 10 INJECTION, SOLUTION INTRAMUSCULAR; INTRAVENOUS; SUBCUTANEOUS at 11:05

## 2018-01-12 RX ADMIN — OXYTOCIN-SODIUM CHLORIDE 0.9% IV SOLN 30 UNIT/500ML 100 ML/HR: 30-0.9/5 SOLUTION at 16:14

## 2018-01-12 RX ADMIN — DIPHENHYDRAMINE HYDROCHLORIDE 25 MG: 50 INJECTION, SOLUTION INTRAMUSCULAR; INTRAVENOUS at 13:01

## 2018-01-12 RX ADMIN — ACETAMINOPHEN 975 MG: 325 TABLET, FILM COATED ORAL at 15:53

## 2018-01-12 RX ADMIN — Medication 5 MG: at 11:10

## 2018-01-12 RX ADMIN — MORPHINE SULFATE 0.2 MG: 1 INJECTION EPIDURAL; INTRATHECAL; INTRAVENOUS at 11:04

## 2018-01-12 RX ADMIN — HYDROXYZINE HYDROCHLORIDE 25 MG: 25 TABLET ORAL at 16:13

## 2018-01-12 RX ADMIN — HYDROXYZINE HYDROCHLORIDE 25 MG: 25 TABLET ORAL at 22:28

## 2018-01-12 RX ADMIN — SODIUM CHLORIDE, POTASSIUM CHLORIDE, SODIUM LACTATE AND CALCIUM CHLORIDE 1000 ML: 600; 310; 30; 20 INJECTION, SOLUTION INTRAVENOUS at 09:10

## 2018-01-12 RX ADMIN — SODIUM CHLORIDE, POTASSIUM CHLORIDE, SODIUM LACTATE AND CALCIUM CHLORIDE: 600; 310; 30; 20 INJECTION, SOLUTION INTRAVENOUS at 11:25

## 2018-01-12 RX ADMIN — BUPIVACAINE HYDROCHLORIDE IN DEXTROSE 1.6 ML: 7.5 INJECTION, SOLUTION SUBARACHNOID at 11:04

## 2018-01-12 RX ADMIN — SODIUM CITRATE AND CITRIC ACID MONOHYDRATE 30 ML: 500; 334 SOLUTION ORAL at 10:28

## 2018-01-12 RX ADMIN — ONDANSETRON 4 MG: 2 INJECTION INTRAMUSCULAR; INTRAVENOUS at 11:20

## 2018-01-12 RX ADMIN — DIPHENHYDRAMINE HYDROCHLORIDE 25 MG: 25 CAPSULE ORAL at 19:15

## 2018-01-12 RX ADMIN — SODIUM CHLORIDE, POTASSIUM CHLORIDE, SODIUM LACTATE AND CALCIUM CHLORIDE: 600; 310; 30; 20 INJECTION, SOLUTION INTRAVENOUS at 10:10

## 2018-01-12 RX ADMIN — SIMETHICONE CHEW TAB 80 MG 80 MG: 80 TABLET ORAL at 21:14

## 2018-01-12 RX ADMIN — KETOROLAC TROMETHAMINE 30 MG: 30 INJECTION, SOLUTION INTRAMUSCULAR at 12:15

## 2018-01-12 RX ADMIN — PHENYLEPHRINE HYDROCHLORIDE 100 MCG: 10 INJECTION, SOLUTION INTRAMUSCULAR; INTRAVENOUS; SUBCUTANEOUS at 11:15

## 2018-01-12 RX ADMIN — OXYTOCIN-SODIUM CHLORIDE 0.9% IV SOLN 30 UNIT/500ML 300 ML/HR: 30-0.9/5 SOLUTION at 11:34

## 2018-01-12 NOTE — PLAN OF CARE
Problem: Postpartum ( Delivery) (Adult,Obstetrics,Pediatric)  Goal: Signs and Symptoms of Listed Potential Problems Will be Absent, Minimized or Managed (Postpartum)  Signs and symptoms of listed potential problems will be absent, minimized or managed by discharge/transition of care (reference Postpartum ( Delivery) (Adult,Obstetrics,Pediatric) CPG).  Data: Sandra Steve transferred to [facility] via cart at 1445 after visit to baby. Baby in NICU at this time.  Action: Receiving unit notified of transfer: Yes. Patient and family notified of room change.  Belongings sent to receiving unit. Accompanied by Registered Nurse. Oriented patient to surroundings. Call light within reach.   Response: Patient tolerated transfer and is stable.

## 2018-01-12 NOTE — PROVIDER NOTIFICATION
01/12/18 1743   Provider Notification   Provider Name/Title G2   Method of Notification Electronic Page   Request Evaluate-Remote   Notification Reason Medication Request   Pt is still itchy after Benadryl and hydroxyzine and lotion.  Is there anything she can have for itching?  Thanks

## 2018-01-12 NOTE — OP NOTE
[facility]  Operative Note    Sandra Steve    1985   MRN [id number]  Date of Service: 2018   Surgeon: Urmila Cuevas MD  Assistants: Mona Alvarez MD     Pre-operative diagnosis:   IUP at 39w0d   Bilateral club feet and clenched fists, normal Progenity and SMA testing  Asthma, mild intermittent  H/O     Post-operative diagnosis:   Postpartum s/p repeat low transverse  section      Procedures: repeat low transverse  section with double layer uterine closure via Pfannenstiel incision     Anesthesia: Spinal with duramorph  IVF: 1600cc  UOP: 50cc  EBL: 800,  cc  Intraoperative medications: 2g ancef, IV pitocin    Indications: Sandra Steve is an 32 year old  at 39w0d who presented for repeat . The risks, benefits and alternatives were reviewed and the patient signed informed consent for the procedure. All questions were answered.     Findings: A single cephalic liveborn male weight pending, with apgars of 3, 7, and 9 and 1, 5, and 10 minutes.  Cord gases: arterial pH 7.25, venous pH 7.31.  Vacuum applied due to difficulty with delivery of head.  Normal appearing uterus, fallopian tubes, ovaries. No nuchal cord.  Clear amniotic fluid.  Intact placenta, 3V cord. Minimal fascial adhesions.  Minimal intraabdominal adhesions.    Specimen: cord blood    Complications: None apparent     Procedure Details: The patient was taken back to the operating room where she underwent spinal anesthesia. SCDs and schmidt catheter placed. She was prepped and draped in the usual sterile fashion in the dorsal supine position with a leftward tilt. A safety time out was performed. 2 g Ancef was administered. After testing to ensure adequate anesthesia, a Pfannenstiel skin incision was made with the scalpel and carried through the underlying layers to the fascia. The fascia was incised in the midline and extended laterally with Teo  scissors. The superior aspect of the fascial incision was grasped with kocher clamps, elevated and the underlying rectus muscles dissected off with a combination of blunt and sharp dissection.  Attention was then turned to the inferior aspect of the incision, which in a similar fashion was grasped, tented up and the rectus muscle dissected off the fascia. The rectus muscles were  in the midline.  Recto-fascial adhesions were lysed sharply with Bruce scissors.  The peritoneum was identified and entered bluntly.  Thin intraabdominal adhesions were carefully inspected and noted to be free of viscera and dissected sharply. The abdominal incision was stretched. The bladder blade was inserted. The lower uterine segment was incised in a transverse fashion with the scalpel. The incision was extended with digital pressure in cranial and caudal directions. The bladder blade was removed. The infant was cephalic and a vacuum was applied due to difficulty delivering the head/large size.  After one pop-off, the infant was delivered without difficulty.  The infant was not vigorous and the cord was immediately clamped and cut and handed to waiting NICU staff.   A segment of cord was taken for cord blood gases. The placenta was removed with gentle traction on the cord and fundal massage.     The uterus was exteriorized and cleared of all clots and debris. The uterine incision was repaired with 0- Monocryl in a running locked fashion. A second layer of 0-Monocryl was used to horizontally imbricate the incision. The uterus was noted to be hemostatic. Oxytocin was given through running IV.  The posterior cul-de-sac was irrigated and cleared of clots and debris. The uterus was then returned to the abdomen and noted to be hemostatic. The pericolic gutters were irrigated and cleared of clots.  A kocher clamp was used to elevate the fascia superiorly and inferiorly and areas of oozing were controlled with cautery until good  hemostasis was noted.  Seprafilm was placed over the hysterotomy and between the rectus and fascia. The fascia was closed with 0-Vicryl in a running fashion. The subcutaneous tissue was irrigated and areas of bleeding were controlled with cautery. The subcutaneous tissue was closed with 2-0 pain gut. The skin was closed with 4-0 Vicryl and overlayed with Dermabond. The patient tolerated the procedure well and was taken to the recovery room in stable condition. All lap, instrument, and sharps counts were correct times two. Dr. Cuevas was scrubbed and present for the procedure.     Mona Alvarez MD   PGY-2 OB/GYN  1/12/2018 12:34 PM      I was present and scrubbed for entirety of the surgical case and  agree with note as edited to reflect findings.      KAYE CUEVAS

## 2018-01-12 NOTE — DISCHARGE SUMMARY
[facility] Discharge Summary    Sandra Steve MRN# [id number]   Age: 32 year old YOB: 1985     Date of Admission:  2018  Date of Discharge:  1/15/18  Admitting Physician:  Urmila Cuevas MD  Discharge Physician:  Emma Yeung MD    Admit Dx:   IUP at 39w0d   Bilateral club feet and clenched fists, normal Progenity and SMA testing  Asthma, mild intermittent  H/O     Discharge Dx:  - Same as above, s/p repeat low transverse  section    Procedures:  - Repeat low transverse  section with double layer uterine closure via Pfannenstiel incision  - Spinal analgesia    Admit HPI:  Ms. Sandra Steve is a 32 year old  female at 39w0d by 7w4d US who presents for scheduled  section. Her pregnancy is complicated by: asthma, bilateral club feet and clenched fists, asthma, anxiety, and history of  section.    Please see her admit H&P for full details of her PMH, PSH, Meds, Allergies and exam on admit.    Operative Course:  Surgery was uncomplicated. QBL from the delivery was 435. Please see her  Section Operative Note for full details regarding her delivery.    Operative Findings:   A single cephalic liveborn male weight pending, with apgars done by NICU, Apgars 3, 7, 9.  Vacuum applied due to difficulty delivering fetal head.  Normal appearing uterus, fallopian tubes, ovaries. No nuchal cord.  Clear amniotic fluid.  Intact placenta, 3V cord. Minimal fascial adhesions.  Minimal intraabdominal adhesions.    Postoperative Course:  Her postoperative course was uncomplicated. On POD#3, she was meeting all of her postpartum goals and deemed stable for discharge. She was voiding without difficulty, tolerating a regular diet without nausea and vomiting, her pain was well controlled on oral pain medicines and her lochia was appropriate. Her hemoglobin prior to delivery was 13.0 and after delivery was 11.2. Her Rh status was  positive and Rhogam was not indicated.    Discharge Medications:      Review of your medicines      START taking       Dose / Directions    ibuprofen 600 MG tablet   Commonly known as:  ADVIL/MOTRIN        Dose:  600 mg   Take 1 tablet (600 mg) by mouth every 6 hours as needed for moderate pain   Quantity:  30 tablet   Refills:  1       oxyCODONE-acetaminophen 5-325 MG per tablet   Commonly known as:  PERCOCET        Dose:  1-2 tablet   Take 1-2 tablets by mouth every 4 hours as needed for pain maximum 8 tablet(s) per day   Quantity:  30 tablet   Refills:  0       senna-docusate 8.6-50 MG per tablet   Commonly known as:  ERICA-COLACE        Dose:  1-2 tablet   Take 1-2 tablets by mouth 2 times daily as needed   Quantity:  60 tablet   Refills:  1         CONTINUE these medicines which have NOT CHANGED       Dose / Directions    prenatal multivitamin plus iron 27-0.8 MG Tabs per tablet        Dose:  1 tablet   Take 1 tablet by mouth daily   Refills:  0            Where to get your medicines      These medications were sent to [facility] [address]  [address] [address]     Phone:  [phone]      ibuprofen 600 MG tablet     senna-docusate 8.6-50 MG per tablet         Some of these will need a paper prescription and others can be bought over the counter. Ask your nurse if you have questions.     Bring a paper prescription for each of these medications      oxyCODONE-acetaminophen 5-325 MG per tablet           Discharge/Disposition:  Sandra Steve was discharged to home in stable condition with the following instructions/medications:  1) Call for temperature > 100.4, bright red vaginal bleeding >1 pad an hour x 2 hours, foul smelling vaginal discharge, pain not controlled by usual oral pain meds, persistent nausea and vomiting not controlled on medications, drainage or redness from incision site  2) She desired Mirena vs. nuvaring for contraception.  3)  For feeding she decided to bottle feed.  4) She was instructed to follow-up with her primary OB in 6 weeks for a routine postpartum visit.  5) Discharge activity:  No heavy lifting >15 lbs or strenuous activity for 6 weeks, pelvic rest for 6 weeks, no driving or operating machinery while on narcotics.    Mona Alvarez MD  PGY2 OBGYN

## 2018-01-12 NOTE — IP AVS SNAPSHOT
MRN:[id number]                      After Visit Summary   2018    Sandra Steve    MRN: [id number]           Thank you!     Thank you for choosing [facility] for your care. Our goal is always to provide you with excellent care. Hearing back from our patients is one way we can continue to improve our services. Please take a few minutes to complete the written survey that you may receive in the mail after you visit with us. Thank you!        Patient Information     Date Of Birth          1985        Designated Caregiver       Most Recent Value    Caregiver    Will someone help with your care after discharge? no      About your hospital stay     You were admitted on:  2018 You last received care in the:  [facility]    You were discharged on:  January 15, 2018        Reason for your hospital stay       Maternity care                  Who to Call     For medical emergencies, please call 911.  For non-urgent questions about your medical care, please call your primary care provider or clinic, [phone]  For questions related to your surgery, please call your surgery clinic        Attending Provider     Provider Specialty    Urmila Cuevas MD OB/Gyn       Primary Care Provider Office Phone # Fax #    Chika Phillips [phone]      After Care Instructions     Activity       Review discharge instructions            Diet       Resume previous diet            Discharge Instructions - Postpartum visit       Schedule postpartum visit with your provider and return to clinic in 6 weeks.    Activity Instructions:     -  delivery: No lifting more than 15 pounds for 6 weeks.  Nothing in the vagina for 6 weeks, no intercourse for 6 weeks. No strenuous exercise for 6 weeks. No driving until you have stopped taking your pain medications.      Call your health care provider if you have any of the following: Fever above 100.4 F; opening or drainage from your  incision; soaking a sanitary pad with blood within 1 hour, or you see blood clots larger than a golf ball; malodorous vaginal discharge, severe or worsening pain uncontrolled by your pain medications, nausea and vomiting, severe headaches, changes in vision, calf swelling or pain, shortness of breath, problems coping with sadness, anxiety, or depression.  If you have any concerns about hurting yourself or the baby, call your provider immediately. You are encouraged to call with questions or concerns after you return home.                  Further instructions from your care team       Postop  Birth Instructions    Activity       Do not lift more than 10 pounds for 6 weeks after surgery.  Ask family and friends for help when you need it.    No driving until you have stopped taking your pain medications (usually two weeks after surgery).    No heavy exercise or activity for 6 weeks.  Don't do anything that will put a strain on your surgery site.    Don't strain when using the toilet.  Your care team may prescribe a stool softener if you have problems with your bowel movements.     To care for your incision:       Keep the incision clean and dry.    Do not soak your incision in water. No swimming or hot tubs until it has fully healed. You may soak in the bathtub if the water level is below your incision.    Do not use peroxide, gel, cream, lotion, or ointment on your incision.    Adjust your clothes to avoid pressure on your surgery site (check the elastic in your underwear for example).     You may see a small amount of clear or pink drainage and this is normal.  Check with your health care provider:       If the drainage increases or has an odor.    If the incision reddens, you have swelling, or develop a rash.    If you have increased pain and the medicine we prescribed doesn't help.    If you have a fever above 100.4 F (38 C) with or without chills when placing thermometer under your tongue.   The area  "around your incision (surgery wound), will feel numb.  This is normal. The numbness should go away in less than a year.     Keep your hands clean:  Always wash your hands before touching your incision (surgery wound). This helps reduce your risk of infection. If your hands aren't dirty, you may use an alcohol hand-rub to clean your hands. Keep your nails clean and short.    Call your healthcare provider if you have any of these symptoms:       You soak a sanitary pad with blood within 1 hour, or you see blood clots larger than a golf ball.    Bleeding that lasts more than 6 weeks.    Vaginal discharge that smells bad.    Severe pain, cramping or tenderness in your lower belly area.    A need to urinate more frequently (use the toilet more often), more urgently (use the toilet very quickly), or it burns when you urinate.    Nausea and vomiting.    Redness, swelling or pain around a vein in your leg.    Problems breastfeeding or a red or painful area on your breast.    Chest pain and cough or are gasping for air.    Problems with coping with sadness, anxiety or depression. If you have concerns about hurting yourself or the baby, call your provider immediately.      You have questions or concerns after you return home.                  Pending Results     No orders found from 1/10/2018 to 1/13/2018.            Statement of Approval     Ordered          01/15/18 1057  I have reviewed and agree with all the recommendations and orders detailed in this document.  EFFECTIVE NOW     Approved and electronically signed by:  Emma Yeung MD             Admission Information     Date & Time Provider Department Dept. Phone    1/12/2018 Urmila Cuevas MD [facility] [phone]      Your Vitals Were     Blood Pressure Pulse Temperature Respirations Height Weight    123/91 95 97.9  F (36.6  C) (Oral) 8 1.6 m (5' 3\") 83.9 kg (185 lb)    Last Period Pulse Oximetry BMI (Body Mass Index)             (LMP Unknown) 97% " 32.77 kg/m2         [employer] Information     [employer] gives you secure access to your electronic health record. If you see a primary care provider, you can also send messages to your care team and make appointments. If you have questions, please call your primary care clinic.  If you do not have a primary care provider, please call [phone] and they will assist you.        Care EveryWhere ID     This is your Care EveryWhere ID. This could be used by other organizations to access your [place] medical records  [id number]        Equal Access to Services     LISA FRASER AH: Hadii gayle marsho Soomaali, waaxda luqadaha, qaybta kaalmada emi, kenia tapia . So [facility] [phone].    ATENCIÓN: Si habla español, tiene a tejeda disposición servicios gratuitos de asistencia lingüística. Llame al [phone].    We comply with applicable federal civil rights laws and [place] laws. We do not discriminate on the basis of race, color, national origin, age, disability, sex, sexual orientation, or gender identity.               Review of your medicines      START taking        Dose / Directions    ibuprofen 600 MG tablet   Commonly known as:  ADVIL/MOTRIN        Dose:  600 mg   Take 1 tablet (600 mg) by mouth every 6 hours as needed for moderate pain   Quantity:  30 tablet   Refills:  1       oxyCODONE-acetaminophen 5-325 MG per tablet   Commonly known as:  PERCOCET        Dose:  1-2 tablet   Take 1-2 tablets by mouth every 4 hours as needed for pain maximum 8 tablet(s) per day   Quantity:  30 tablet   Refills:  0       senna-docusate 8.6-50 MG per tablet   Commonly known as:  ERICA-COLACE        Dose:  1-2 tablet   Take 1-2 tablets by mouth 2 times daily as needed   Quantity:  60 tablet   Refills:  1         CONTINUE these medicines which have NOT CHANGED        Dose / Directions    prenatal multivitamin plus iron 27-0.8 MG Tabs per tablet        Dose:  1 tablet   Take 1 tablet by mouth daily    Refills:  0            Where to get your medicines      These medications were sent to [facility] [place], [place] - [address]  [address], [facility] [address]     Phone:  [phone]     ibuprofen 600 MG tablet    senna-docusate 8.6-50 MG per tablet         Some of these will need a paper prescription and others can be bought over the counter. Ask your nurse if you have questions.     Bring a paper prescription for each of these medications     oxyCODONE-acetaminophen 5-325 MG per tablet                Protect others around you: Learn how to safely use, store and throw away your medicines at www.disposemymeds.org.             Medication List: This is a list of all your medications and when to take them. Check marks below indicate your daily home schedule. Keep this list as a reference.      Medications           Morning Afternoon Evening Bedtime As Needed    ibuprofen 600 MG tablet   Commonly known as:  ADVIL/MOTRIN   Take 1 tablet (600 mg) by mouth every 6 hours as needed for moderate pain   Last time this was given:  800 mg on 1/15/2018  9:39 AM                                oxyCODONE-acetaminophen 5-325 MG per tablet   Commonly known as:  PERCOCET   Take 1-2 tablets by mouth every 4 hours as needed for pain maximum 8 tablet(s) per day                                prenatal multivitamin plus iron 27-0.8 MG Tabs per tablet   Take 1 tablet by mouth daily                                senna-docusate 8.6-50 MG per tablet   Commonly known as:  ERICA-COLACE   Take 1-2 tablets by mouth 2 times daily as needed   Last time this was given:  1 tablet on 1/14/2018  8:50 AM

## 2018-01-12 NOTE — IP AVS SNAPSHOT
[facility]    [address] [address]    Phone:  [phone]                                       After Visit Summary   1/12/2018    Sandra Steve    MRN: [id number]           After Visit Summary Signature Page     I have received my discharge instructions, and my questions have been answered. I have discussed any challenges I see with this plan with the nurse or doctor.    ..........................................................................................................................................  Patient/Patient Representative Signature      ..........................................................................................................................................  Patient Representative Print Name and Relationship to Patient    ..................................................               ................................................  Date                                            Time    ..........................................................................................................................................  Reviewed by Signature/Title    ...................................................              ..............................................  Date                                                            Time

## 2018-01-12 NOTE — PLAN OF CARE
Problem: Surgery Nonspecified (Adult)  Goal: Signs and Symptoms of Listed Potential Problems Will be Absent, Minimized or Managed (Surgery Nonspecified)  Signs and symptoms of listed potential problems will be absent, minimized or managed by discharge/transition of care (reference Surgery Nonspecified (Adult) CPG).   Data: Patient presented to [facility] at 0800 for R C/S. .   Reason for maternal/fetal assessment per patient is Scheduled  Section  .  Patient is a . Prenatal record reviewed. Baby has several fetal anomalies.   Obstetric History       T1      L1     SAB0   TAB0   Ectopic0   Multiple0   Live Births1       # Outcome Date GA Lbr Donald/2nd Weight Sex Delivery Anes PTL Lv   2 Current            1 Term 08/05/15 40w0d  3.402 kg (7 lb 8 oz)  CS-LTranv   MAGDALENA      Name: Zen      . Medical history:   Past Medical History:   Diagnosis Date     Anxiety      Asthma      Chickenpox      Depression      PID (acute pelvic inflammatory disease)     related to IUD insertion   . Gestational Age 39w0d. VSS. Fetal movement present. Patient denies cramping, backache, vaginal discharge, pelvic pressure, UTI symptoms, GI problems, bloody show, vaginal bleeding, edema, headache, visual disturbances, epigastric or URQ pain, abdominal pain, rupture of membranes. Support persons are present, her  [place].  Action: Verbal consent for EFM. Triage assessment completed. EFM applied for fetal status. Baby found to be reactive. Uterine assessment reveal mild contractions felt as tightening to pt. Fetal assessment: Presumed adequate fetal oxygenation documented (see flow record).   Response: Dr. Cuevas informed of arrival. Plan per provider is R C/S. Patient verbalized agreement with plan. Patient transferred to Triage  room 1 ambulatory, oriented to room and call light.

## 2018-01-12 NOTE — ANESTHESIA POSTPROCEDURE EVALUATION
Patient: Sandra Steve    Procedure(s):  Repeat  Section  - Wound Class: II-Clean Contaminated    Diagnosis:Fetal Anomaly   Diagnosis Additional Information: No value filed.    Anesthesia Type:  Spinal, Peripheral Nerve Block for post-op pain at surgeon's request    Note:  Anesthesia Post Evaluation    Patient location during evaluation: PACU  Patient participation: Able to fully participate in evaluation  Level of consciousness: awake  Pain management: adequate  Airway patency: patent  Cardiovascular status: acceptable and stable  Respiratory status: acceptable and room air  Hydration status: acceptable  PONV: none     Anesthetic complications: None          Last vitals:  Vitals:    18 1225 18 1240 18 1255   BP: 109/55 112/68 110/44   Pulse:      Resp:  13 21   Temp: 37  C (98.6  F)     SpO2: 96% 100%        Pt opt out TAP block.      Electronically Signed By: Raúl Lorenzo MD  2018  1:08 PM

## 2018-01-12 NOTE — BRIEF OP NOTE
Brief Op Note    Sandra Steve    1985   MRN [id number]  Date of Service: 2018   Surgeon: Urmila Cuevas MD  Assistants: Mona Alvarez MD     Pre-operative diagnosis:   IUP at 39w0d   Bilateral club feet and clenched fists, normal Progenity and SMA testing  Asthma, mild intermittent  H/O     Post-operative diagnosis:   Postpartum s/p repeat low transverse  section      Procedures: repeat low transverse  section with double layer uterine closure via Pfannenstiel incision, vacuum assisted delivery     Anesthesia: Spinal with duramorph  IVF: 1600cc  UOP: 50cc  QBL: 435cc (EBL 800cc)  Intraoperative medications: 2g ancef, IV pitocin    Indications: Sandra Steve is an 32 year old  at 39w0d who presented for repeat . The risks, benefits and alternatives were reviewed and the patient signed informed consent for the procedure. All questions were answered.     Findings: A single cephalic liveborn male weight pending, with apgars done by NICU, documentation pending.  Vacuum applied due to bulbous head.  Normal appearing uterus, fallopian tubes, ovaries. No nuchal cord.  Clear amniotic fluid.  Intact placenta, 3V cord. Minimal fascial adhesions.  Minimal intraabdominal adhesions.    Specimen: cord gases, cord blood    Complications: None apparent    Urmila Cuevas MD

## 2018-01-12 NOTE — ANESTHESIA PROCEDURE NOTES
Spinal/LP Procedure Note    Spinal Block  Staff:     Anesthesiologist:  HANNAH LOPES    Resident/CRNA:  RAFAEL BARAHONA    Spinal/LP performed by resident/CRNA in presence of a teaching physician.    Location: In OR BEFORE Induction  Procedure Start/Stop Times:      1/12/2018 11:00 AM     1/12/2018 11:05 AM    patient identified, IV checked, site marked, risks and benefits discussed, informed consent, monitors and equipment checked, pre-op evaluation, at physician/surgeon's request and post-op pain management      Correct Patient: Yes      Correct Position: Yes      Correct Site: Yes      Correct Procedure: Yes      Correct Laterality:  Yes    Site Marked:  Yes  Procedure:     Procedure:  Intrathecal    ASA:  2    Position:  Sitting    Sterile Prep: chloraprep      Insertion site:  L3-4    Approach:  Midline    Needle Type:  Pedro    Needle gauge (G):  27    Local Skin Infiltration:  1% lidocaine    amount (ml):  2    Needle Length (in):  3.5    Introducer used: No      Attempts:  2    Redirects:  1    CSF:  Clear    Paresthesias:  No    Time injected:  11:04  Assessment/Narrative:     Sensory Level:  T4

## 2018-01-12 NOTE — ANESTHESIA CARE TRANSFER NOTE
Patient: Sandra Steve    Procedure(s):  Repeat  Section  - Wound Class: II-Clean Contaminated    Diagnosis: Fetal Anomaly   Diagnosis Additional Information: No value filed.    Anesthesia Type:   Spinal, Peripheral Nerve Block for post-op pain at surgeon's request     Note:  Airway :Room Air  Patient transferred to:PACU  Comments: Patient maintained on room air at end of case, VSS and respiratory status adequate. Patient transferred to [facility] and into PACU in L&D without incident. Stable VSS on transfer, report given to RN and care transfer complete.Handoff Report: Identifed the Patient, Identified the Reponsible Provider, Reviewed the pertinent medical history, Discussed the surgical course, Reviewed Intra-OP anesthesia mangement and issues during anesthesia, Set expectations for post-procedure period and Allowed opportunity for questions and acknowledgement of understanding      Vitals: (Last set prior to Anesthesia Care Transfer)    CRNA VITALS  2018 1150 - 2018 1230      2018             Pulse: 70    SpO2: 96 %                Electronically Signed By: Jose Sawyer MD  2018  12:30 PM

## 2018-01-12 NOTE — PLAN OF CARE
Problem: Surgery Nonspecified (Adult)  Goal: Signs and Symptoms of Listed Potential Problems Will be Absent, Minimized or Managed (Surgery Nonspecified)  Signs and symptoms of listed potential problems will be absent, minimized or managed by discharge/transition of care (reference Surgery Nonspecified (Adult) CPG).   Pt prepped for surgery. NPO since 1800 1/11/18. Consents for surgery, blood products and TAPS block explained, signed and witnessed. Plan for surgery as scheduled.

## 2018-01-13 LAB — HGB BLD-MCNC: 11.2 G/DL (ref 11.7–15.7)

## 2018-01-13 PROCEDURE — 25000132 ZZH RX MED GY IP 250 OP 250 PS 637: Performed by: OBSTETRICS & GYNECOLOGY

## 2018-01-13 PROCEDURE — 25000128 H RX IP 250 OP 636: Performed by: OBSTETRICS & GYNECOLOGY

## 2018-01-13 PROCEDURE — 12000028 ZZH R&B OB UMMC

## 2018-01-13 PROCEDURE — 36415 COLL VENOUS BLD VENIPUNCTURE: CPT | Performed by: OBSTETRICS & GYNECOLOGY

## 2018-01-13 PROCEDURE — 85018 HEMOGLOBIN: CPT | Performed by: OBSTETRICS & GYNECOLOGY

## 2018-01-13 RX ORDER — IBUPROFEN 600 MG/1
600 TABLET, FILM COATED ORAL EVERY 6 HOURS PRN
Qty: 30 TABLET | Refills: 1 | Status: SHIPPED | OUTPATIENT
Start: 2018-01-13 | End: 2018-04-20

## 2018-01-13 RX ORDER — AMOXICILLIN 250 MG
1-2 CAPSULE ORAL 2 TIMES DAILY PRN
Qty: 60 TABLET | Refills: 1 | Status: SHIPPED | OUTPATIENT
Start: 2018-01-13 | End: 2018-02-19

## 2018-01-13 RX ORDER — OXYCODONE AND ACETAMINOPHEN 5; 325 MG/1; MG/1
1-2 TABLET ORAL EVERY 4 HOURS PRN
Qty: 30 TABLET | Refills: 0 | Status: SHIPPED | OUTPATIENT
Start: 2018-01-13 | End: 2018-02-19

## 2018-01-13 RX ORDER — NALBUPHINE HYDROCHLORIDE 10 MG/ML
2.5 INJECTION, SOLUTION INTRAMUSCULAR; INTRAVENOUS; SUBCUTANEOUS EVERY 4 HOURS PRN
Status: DISCONTINUED | OUTPATIENT
Start: 2018-01-13 | End: 2018-01-15 | Stop reason: HOSPADM

## 2018-01-13 RX ADMIN — IBUPROFEN 800 MG: 800 TABLET ORAL at 00:20

## 2018-01-13 RX ADMIN — IBUPROFEN 800 MG: 800 TABLET ORAL at 06:08

## 2018-01-13 RX ADMIN — IBUPROFEN 800 MG: 800 TABLET ORAL at 12:41

## 2018-01-13 RX ADMIN — SENNOSIDES AND DOCUSATE SODIUM 1 TABLET: 8.6; 5 TABLET ORAL at 09:56

## 2018-01-13 RX ADMIN — ACETAMINOPHEN 975 MG: 325 TABLET, FILM COATED ORAL at 18:15

## 2018-01-13 RX ADMIN — SENNOSIDES AND DOCUSATE SODIUM 2 TABLET: 8.6; 5 TABLET ORAL at 19:36

## 2018-01-13 RX ADMIN — DIPHENHYDRAMINE HYDROCHLORIDE 25 MG: 25 CAPSULE ORAL at 01:42

## 2018-01-13 RX ADMIN — IBUPROFEN 800 MG: 800 TABLET ORAL at 18:15

## 2018-01-13 RX ADMIN — ACETAMINOPHEN 975 MG: 325 TABLET, FILM COATED ORAL at 00:20

## 2018-01-13 RX ADMIN — OXYCODONE HYDROCHLORIDE 5 MG: 5 TABLET ORAL at 19:36

## 2018-01-13 RX ADMIN — NALBUPHINE HYDROCHLORIDE 2.5 MG: 10 INJECTION, SOLUTION INTRAMUSCULAR; INTRAVENOUS; SUBCUTANEOUS at 01:47

## 2018-01-13 RX ADMIN — ACETAMINOPHEN 975 MG: 325 TABLET, FILM COATED ORAL at 09:56

## 2018-01-13 RX ADMIN — NALBUPHINE HYDROCHLORIDE 2.5 MG: 10 INJECTION, SOLUTION INTRAMUSCULAR; INTRAVENOUS; SUBCUTANEOUS at 05:56

## 2018-01-13 RX ADMIN — HYDROXYZINE HYDROCHLORIDE 25 MG: 25 TABLET ORAL at 04:47

## 2018-01-13 NOTE — PLAN OF CARE
Patient arrived to [facility] unit via zoom cart at 1500,with belongings, accompanied by spouse/ significant other. Infant in NICU.  Received report from GLADYS Dao . Unit and room orientation started. Call light within arms reach; no concerns present at this time. Continue with plan of care.

## 2018-01-13 NOTE — PLAN OF CARE
Problem: Patient Care Overview  Goal: Plan of Care/Patient Progress Review  Outcome: Improving  VS WNL. Able to move freely in bed, denies pain other then itching using benadryl Atarax and Nubain for post anesthesia itching. See flow sheet for assessments. Sleeping off and on as itching allows.

## 2018-01-13 NOTE — PROVIDER NOTIFICATION
01/12/18 2341   Provider Notification   Provider Name/Title G2   Method of Notification Electronic Page   Request Evaluate-Remote   Notification Reason Medication Request   Pt is still very, very itchy.  She is taking Benadryl Q6H and Hydroxyzine Q6H.  Can she please get something else?  Maybe Nubaine?  Thank you!

## 2018-01-13 NOTE — PROGRESS NOTES
Post Partum Progress Note    Subjective:  She is resting comfortably in bed this morning, able to get some sleep overnight. Pain is improving and well controlled on current medication regimen. Tolerating PO intake.  Lochia present and appropriate.  Voiding without difficulty.  Ambulating without dizziness or difficulty.  She denies headache, changes in vision, nausea/vomiting, chest pain, shortness of breath, RUQ pain, or worsening edema.  Bottlefeeding is planned.      Objective:  Vitals:    18 0500 18 0810 18 1700 18 0230   BP: 98/55 111/62 118/60 122/80   Pulse:       Resp:  18    Temp: 98.6  F (37  C) 97.6  F (36.4  C) 97.7  F (36.5  C) 98  F (36.7  C)   TempSrc: Oral  Oral Oral   SpO2: 97%      Weight:       Height:         General: NAD. A&Ox3.  CV/resp: no increased work of breathing, well perfused  Abd: Soft, non-tender, non-distended. Fundus is firm and below the umbilicus.  Incision c/d/i.   Ext: 1+ edema. No calf tenderness.    Assessment/Plan:  Sandra Steve is a 32 year old  female who is POD#2 s/p RLTCS. Stable in the post-operative period.    - Encourage routine post-operative goals including ambulation and incentive spirometry  - PNC: Rh positive. Rubella immune. No intervention indicated.  - Pain: controlled on oral medications  - Heme: Hgb 13.0> > 11.2    - GI: continue anti-emetics and stool softeners as needed.  - : Freedman removed  - Infant: in NICU  - Feeding: Plans on bottlefeeding  - BC: Plans on Mirena vs nuvaring    Discharge to home tomorrow     Brenda Rubin  OB/GYN Resident, PGY-3  2018 7:42 AM       Patient seen and examined by me, agree with above resident note. Doing well physically, worried about baby and scared at the thought they may have to leave him here.  Discussed.  Recovery is going well, as above, she has no complaints.  Cont routine cares.  D/c in am    COURT YANCEY MD

## 2018-01-13 NOTE — PLAN OF CARE
Problem: Patient Care Overview  Goal: Plan of Care/Patient Progress Review  Outcome: Improving  Patient doing very well. Full assessment and VS WDL. Pain well controlled with ibuprofen and tylenol. Does state the itchiness has subsided. Up ad mercedes in room and hallway. Down in NICU to see baby most of the day.

## 2018-01-13 NOTE — PROGRESS NOTES
POD # 1    SUBJECTIVE: Feels well physically, tearful this morning as just came back from NICU, reports that feeding tube for baby could not be inserted, not sure yet what other options will be.  Tolerating oral intake.     OBJECTIVE:   Patient Vitals for the past 24 hrs:   BP Temp Temp src Heart Rate Resp SpO2   01/13/18 0810 111/62 97.6  F (36.4  C) - 70 20 -   01/13/18 0500 98/55 98.6  F (37  C) Oral 70 18 97 %   01/13/18 0200 99/59 98  F (36.7  C) Oral 68 17 99 %   01/13/18 0020 95/50 98.2  F (36.8  C) Oral 69 16 96 %   01/12/18 2100 101/56 98  F (36.7  C) Oral 66 16 99 %   01/12/18 2000 101/55 98.3  F (36.8  C) Oral 62 16 100 %   01/12/18 1900 124/69 98  F (36.7  C) Oral 81 18 99 %   01/12/18 1800 134/70 98.1  F (36.7  C) Oral 80 16 100 %   01/12/18 1700 118/69 98  F (36.7  C) Oral 81 16 100 %   01/12/18 1600 121/68 98.2  F (36.8  C) Oral 86 16 100 %   01/12/18 1535 118/65 - - 83 18 100 %   01/12/18 1505 117/71 98.2  F (36.8  C) Oral 93 18 100 %   01/12/18 1410 112/70 - - 73 18 98 %   01/12/18 1355 111/60 - - 65 12 100 %   01/12/18 1345 109/63 - - - - -   01/12/18 1340 109/63 - - 68 17 96 %   01/12/18 1330 114/59 - - - - -   01/12/18 1325 114/59 - - 92 15 97 %   01/12/18 1315 112/55 - - - - -   01/12/18 1310 112/55 - - - 21 94 %   01/12/18 1300 110/44 - - - - -   01/12/18 1255 110/44 - - 80 21 -   01/12/18 1245 112/68 - - - - -   01/12/18 1240 112/68 - - 90 13 100 %   01/12/18 1225 109/55 98.6  F (37  C) Oral 66 - 96 %     Abdomen soft, non-tender.  Incision healing.  Fundus firm.      ASSESSMENT: Stable POD # 1.    PLAN: Routine care.  See juwan.      RAHEL Cruz MD

## 2018-01-13 NOTE — PLAN OF CARE
Problem: Patient Care Overview  Goal: Plan of Care/Patient Progress Review  Outcome: Improving  VSS and postpartum assessments WDL.  Up to bathroom with steady gait.  Into wheelchair to go visit infant in NICU.  Not pumping as pt is formula feeding infant.  Incisional liquid bandage intact.  Freedman catheter collecting adequate amount of urine.  Pt tolerating regular diet.  Pain managed with tylenol and toradol per MAR.  Pt c/o itching on face, somewhat relieved with benadryl, hydroxyzine, washing with warm water and baby shampoo and lotion.  PIV saline locked after second bag of pitocin.  , Austin present and supportive.  Room and unit orientation completed.  Reviewed welcome folder.  EDS completed, score=7 (question #10=0).  Will continue with postpartum cares and education per plan of care.

## 2018-01-14 PROCEDURE — 25000132 ZZH RX MED GY IP 250 OP 250 PS 637: Performed by: OBSTETRICS & GYNECOLOGY

## 2018-01-14 PROCEDURE — 12000028 ZZH R&B OB UMMC

## 2018-01-14 RX ADMIN — SENNOSIDES AND DOCUSATE SODIUM 1 TABLET: 8.6; 5 TABLET ORAL at 08:50

## 2018-01-14 RX ADMIN — IBUPROFEN 800 MG: 800 TABLET ORAL at 02:30

## 2018-01-14 RX ADMIN — IBUPROFEN 800 MG: 800 TABLET ORAL at 08:50

## 2018-01-14 RX ADMIN — ACETAMINOPHEN 975 MG: 325 TABLET, FILM COATED ORAL at 19:42

## 2018-01-14 RX ADMIN — ACETAMINOPHEN 975 MG: 325 TABLET, FILM COATED ORAL at 11:08

## 2018-01-14 RX ADMIN — ACETAMINOPHEN 975 MG: 325 TABLET, FILM COATED ORAL at 02:30

## 2018-01-14 RX ADMIN — OXYCODONE HYDROCHLORIDE 5 MG: 5 TABLET ORAL at 19:42

## 2018-01-14 RX ADMIN — IBUPROFEN 800 MG: 800 TABLET ORAL at 20:51

## 2018-01-14 NOTE — PLAN OF CARE
Problem: Patient Care Overview  Goal: Plan of Care/Patient Progress Review  Outcome: Improving  Patient doing very well. Full assessment and VS WDL. Up ad mercedes and tolerating it well. Encouraged to walk in hallway. Pain well controlled with prn pain meds. A bit weepy today at times as they learn more about the prognosis of their baby.

## 2018-01-14 NOTE — PLAN OF CARE
"Problem: Patient Care Overview  Goal: Plan of Care/Patient Progress Review  Outcome: Improving  PP assessment WNL. Vital signs stable. Pt up ad mercedes, steady gait. Intake and output remains appropriate. Encouraged fluid intake. Pain managed with Ibuprofen and tylenol and one time oxycodone for incisional pain-see MAR. Therapeutic communication used when discussing infant in NICU and the uncertainty of his condition and what the future holds.  She expressed feeling like she was helpless and wished she could \"do something\".  It was suggested if she liked a pump could be set up and that some mothers find comfort in \"doing something\" for their infant in the NICU even if breastfeeding isn't a long term goal. Incision is NISSA, no drainage noted. No further concerns, will continue with pt plan of care.         "

## 2018-01-15 VITALS
RESPIRATION RATE: 8 BRPM | OXYGEN SATURATION: 97 % | HEIGHT: 63 IN | TEMPERATURE: 97.9 F | WEIGHT: 185 LBS | SYSTOLIC BLOOD PRESSURE: 123 MMHG | HEART RATE: 95 BPM | BODY MASS INDEX: 32.78 KG/M2 | DIASTOLIC BLOOD PRESSURE: 91 MMHG

## 2018-01-15 PROCEDURE — 25000132 ZZH RX MED GY IP 250 OP 250 PS 637: Performed by: OBSTETRICS & GYNECOLOGY

## 2018-01-15 RX ADMIN — ACETAMINOPHEN 975 MG: 325 TABLET, FILM COATED ORAL at 06:46

## 2018-01-15 RX ADMIN — OXYCODONE HYDROCHLORIDE 5 MG: 5 TABLET ORAL at 00:15

## 2018-01-15 RX ADMIN — ACETAMINOPHEN 975 MG: 325 TABLET, FILM COATED ORAL at 14:13

## 2018-01-15 RX ADMIN — OXYCODONE HYDROCHLORIDE 5 MG: 5 TABLET ORAL at 06:46

## 2018-01-15 RX ADMIN — IBUPROFEN 800 MG: 800 TABLET ORAL at 09:39

## 2018-01-15 RX ADMIN — OXYCODONE HYDROCHLORIDE 5 MG: 5 TABLET ORAL at 14:03

## 2018-01-15 RX ADMIN — OXYCODONE HYDROCHLORIDE 5 MG: 5 TABLET ORAL at 03:09

## 2018-01-15 RX ADMIN — IBUPROFEN 800 MG: 800 TABLET ORAL at 03:10

## 2018-01-15 NOTE — PROGRESS NOTES
Postpartum Progress Note    Sandra Steve  [id number]    Subjective:     Post Partum Progress Note    Subjective:  She is resting comfortably in bed this morning.  No concerns this morning. Pain is improving and well controlled on current medication regimen. Tolerating PO intake.  Lochia present and minimal.  Voiding without difficulty.  Passing flatus. Ambulating without dizziness or difficulty.  She denies headache, changes in vision, nausea/vomiting, chest pain, shortness of breath, RUQ pain, or worsening edema.  Plans to bottlefeed.    Objective:  Vitals:    18 0230 18 0853 18 1630 01/15/18 0020   BP: 122/80 123/75 128/62 112/65   Pulse:       Resp:  16 17   Temp: 98  F (36.7  C)  98  F (36.7  C) 98.4  F (36.9  C)   TempSrc: Oral  Oral Oral   SpO2:       Weight:       Height:           General: NAD. A&Ox3.  CV: Regular rate and rhythm  Pulm: CTAB. Normal respiratory effort.  Abd: Soft, non-tender, non-distended. Fundus is firm and below the umbilicus.  Incision c/d/i.  Ext: trace edema. No calf tenderness.    Assessment/Plan:  Sandra Steve is a 32 year old  female who is POD#3 s/p RLTCS. Stable in the postpartum period..    - Encourage routine post-operative goals including ambulation and incentive spirometry  - PNC: Rh positive. Rubella immune. No intervention indicated.  - Pain: controlled on oral medications  - Heme: Hgb 13.0> > 11.2 Hemodynamically stable.  - GI: continue anti-emetics and stool softeners as needed.  - : Voiding spontaneously.  - Infant: Stable in NICU, Concern for arthrogryposis.  - Feeding: Plans on bottlefeeding.  - BC: Plans on Mirena vs. NuvaRing    Discharge to home on today    Juliette Nair MD  OB/GYN Resident, PGY-3  1/15/2018 6:38 AM

## 2018-01-15 NOTE — PLAN OF CARE
Problem: Patient Care Overview  Goal: Plan of Care/Patient Progress Review  Outcome: Improving  VS WNL, incision showing no signs of infection. Pt up and about in room tolerating well, states is having occasional cramping like in labor. Reason for this explained and hot packs offered and plan made with Pt to bring pain med's in when she can have them otherwise she will call if she needs anything to maximize sleep periods.

## 2018-01-15 NOTE — PLAN OF CARE
Patient discharged per ambulatory to NICU boarding room. Infant is in NICUt.ischarge instructions given. Encouraged to call for any problems, questions or concerns. RXs percocet, ibuprofen, senna reviewed and sent with pt.

## 2018-01-15 NOTE — PLAN OF CARE
Problem: Patient Care Overview  Goal: Plan of Care/Patient Progress Review  Outcome: Improving  Pt is alert and and oriented. VS and assessment WNL. Tearful due to baby status in NICU. Complained of pain and Took Tylenol, Ibuprofen and Oxycodone 1 tab only. Incision is CDI/NISSA. Independent in the room with her . Will continue to follow POC.

## 2018-01-15 NOTE — PROGRESS NOTES
[facility]  MATERNAL CHILD HEALTH   SOCIAL WORK PROGRESS NOTE      DATA:   Received order for SW to see for support related to baby's NICU admission.     INTERVENTION:   NICU psychosocial assessment completed.  Details of the assessment can be found in baby's chart in the NICU.  Provided supportive counseling related to baby's status and NICU admission.     ASSESSMENT:   Patient and spouse are appropriately concerned about baby and are tearful at times.  They are surrounded with good support.      PLAN:   SW will continue to follow along throughout baby's NICU admission for needs and for support.

## 2018-02-12 DIAGNOSIS — Z82.0 FAMILY HISTORY OF NEUROLOGICAL DISEASE: Primary | ICD-10-CM

## 2018-02-13 DIAGNOSIS — Z82.0 FAMILY HISTORY OF NEUROLOGICAL DISEASE: ICD-10-CM

## 2018-02-13 PROCEDURE — 81415 EXOME SEQUENCE ANALYSIS: CPT | Performed by: GENETIC COUNSELOR, MS

## 2018-02-13 PROCEDURE — 36415 COLL VENOUS BLD VENIPUNCTURE: CPT | Performed by: GENETIC COUNSELOR, MS

## 2018-02-13 PROCEDURE — 81460 WHOLE MITOCHONDRIAL GENOME: CPT | Performed by: GENETIC COUNSELOR, MS

## 2018-02-15 LAB
LOCATION PERFORMED: NORMAL
RESULT: NORMAL
SEND OUTS MISC TEST CODE: 897
SEND OUTS MISC TEST SPECIMEN: NORMAL
TEST NAME: NORMAL

## 2018-02-19 ENCOUNTER — OFFICE VISIT (OUTPATIENT)
Dept: FAMILY MEDICINE | Facility: CLINIC | Age: 33
End: 2018-02-19
Payer: COMMERCIAL

## 2018-02-19 VITALS
DIASTOLIC BLOOD PRESSURE: 64 MMHG | WEIGHT: 170 LBS | BODY MASS INDEX: 30.12 KG/M2 | SYSTOLIC BLOOD PRESSURE: 100 MMHG | TEMPERATURE: 98.9 F | HEIGHT: 63 IN | HEART RATE: 64 BPM

## 2018-02-19 DIAGNOSIS — J02.9 VIRAL PHARYNGITIS: Primary | ICD-10-CM

## 2018-02-19 LAB
DEPRECATED S PYO AG THROAT QL EIA: NORMAL
SPECIMEN SOURCE: NORMAL

## 2018-02-19 PROCEDURE — 87081 CULTURE SCREEN ONLY: CPT | Performed by: FAMILY MEDICINE

## 2018-02-19 PROCEDURE — 99213 OFFICE O/P EST LOW 20 MIN: CPT | Performed by: FAMILY MEDICINE

## 2018-02-19 PROCEDURE — 87880 STREP A ASSAY W/OPTIC: CPT | Performed by: FAMILY MEDICINE

## 2018-02-19 NOTE — NURSING NOTE
"Chief Complaint   Patient presents with     Pharyngitis       Initial /64  Pulse 64  Temp 98.9  F (37.2  C) (Tympanic)  Ht 5' 3\" (1.6 m)  Wt 170 lb (77.1 kg)  LMP  (LMP Unknown)  BMI 30.11 kg/m2 Estimated body mass index is 30.11 kg/(m^2) as calculated from the following:    Height as of this encounter: 5' 3\" (1.6 m).    Weight as of this encounter: 170 lb (77.1 kg).  Medication Reconciliation: complete  "

## 2018-02-19 NOTE — MR AVS SNAPSHOT
After Visit Summary   2/19/2018    Sandra Steve    MRN: [id number]           Patient Information     Date Of Birth          1985        Visit Information        Provider Department      2/19/2018 3:00 PM Tania Sheldon MD [facility]        Today's Diagnoses     Viral pharyngitis    -  1       Follow-ups after your visit        Your next 10 appointments already scheduled     Mar 15, 2018  2:45 PM CDT   [facility]   [facility]    [address] [address]   [phone]              Who to contact     Normal or non-critical lab and imaging results will be communicated to you by shipbeathart, letter or phone within 4 business days after the clinic has received the results. If you do not hear from us within 7 days, please contact the clinic through shipbeathart or phone. If you have a critical or abnormal lab result, we will notify you by phone as soon as possible.  Submit refill requests through OpenTable or call your pharmacy and they will forward the refill request to us. Please allow 3 business days for your refill to be completed.          If you need to speak with a  for additional information , please call: [phone]           Additional Information About Your Visit        OpenTable Information     OpenTable gives you secure access to your electronic health record. If you see a primary care provider, you can also send messages to your care team and make appointments. If you have questions, please call your primary care clinic.  If you do not have a primary care provider, please call [phone] and they will assist you.        Care EveryWhere ID     This is your Care EveryWhere ID. This could be used by other organizations to access your [place] medical records  [id number]        Your Vitals Were     Pulse Temperature Height Last Period BMI (Body Mass Index)  "      64 98.9  F (37.2  C) (Tympanic) 5' 3\" (1.6 m) (LMP Unknown) 30.11 kg/m2        Blood Pressure from Last 3 Encounters:   02/19/18 100/64   01/15/18 (!) 123/91   01/11/18 110/69    Weight from Last 3 Encounters:   02/19/18 170 lb (77.1 kg)   01/12/18 185 lb (83.9 kg)   01/11/18 184 lb 9.6 oz (83.7 kg)              We Performed the Following     Beta strep group A culture     Strep, Rapid Screen          Today's Medication Changes          These changes are accurate as of 2/19/18 11:59 PM.  If you have any questions, ask your nurse or doctor.               Stop taking these medicines if you haven't already. Please contact your care team if you have questions.     oxyCODONE-acetaminophen 5-325 MG per tablet   Commonly known as:  PERCOCET   Stopped by:  Tania Sheldon MD           prenatal multivitamin plus iron 27-0.8 MG Tabs per tablet   Stopped by:  Tania Sheldon MD           senna-docusate 8.6-50 MG per tablet   Commonly known as:  ERICA-COLACE   Stopped by:  Tania Sheldon MD                    Primary Care Provider Office Phone # Fax #    Chika Love Phillips [phone] [address] [facility] [address] [place] [address]        Equal Access to Services     [facility] EVELIO AH: Hadii gayle ku hadasho Soomaali, waaxda luqadaha, qaybta kaalmada adeegyada, kenia tapia . So [facility] [place] [phone].    ATENCIÓN: Si habla español, tiene a tejeda disposición servicios gratuitos de asistencia lingüística. Rock al [phone].    We comply with applicable federal civil rights laws and [place] laws. We do not discriminate on the basis of race, color, national origin, age, disability, sex, sexual orientation, or gender identity.            Thank you!     Thank you for choosing [facility]  for your care. Our goal is always to provide you with excellent care. Hearing back from our patients is one way we can continue to improve our services. Please take a few minutes to " complete the written survey that you may receive in the mail after your visit with us. Thank you!             Your Updated Medication List - Protect others around you: Learn how to safely use, store and throw away your medicines at www.disposemymeds.org.          This list is accurate as of 18 11:59 PM.  Always use your most recent med list.                   Brand Name Dispense Instructions for use Diagnosis    ibuprofen 600 MG tablet    ADVIL/MOTRIN    30 tablet    Take 1 tablet (600 mg) by mouth every 6 hours as needed for moderate pain    S/P  section

## 2018-02-19 NOTE — PROGRESS NOTES
"  SUBJECTIVE:   Sandra Steve is a 32 year old female who presents to clinic today for the following health issues:      Acute Illness   Acute illness concerns: Sore throat and low grade fevers  Onset: 3 days    Fever: YES- Low grade    Chills/Sweats: no    Headache (location?): YES    Sinus Pressure:no    Conjunctivitis:  no    Ear Pain: no    Rhinorrhea: no    Congestion: no    Sore Throat: YES     Cough: no    Wheeze: no    Decreased Appetite: no    Nausea: no    Vomiting: no    Diarrhea:  no    Dysuria/Freq.: no    Fatigue/Achiness: YES    Sick/Strep Exposure: YES- daughter with strep     Therapies Tried and outcome: None        ROS:  Constitutional, HEENT, cardiovascular, pulmonary, gi and gu systems are negative, except as otherwise noted.    This document serves as a record of the services and decisions personally performed and made by Tania Sheldon MD. It was created on his behalf by Charles Garcia, a trained medical scribe. The creation of this document is based the provider's statements to the medical scribe.  Charles Garcia 2:58 PM February 19, 2018  OBJECTIVE:   /64  Pulse 64  Temp 98.9  F (37.2  C) (Tympanic)  Ht 5' 3\" (1.6 m)  Wt 170 lb (77.1 kg)  LMP  (LMP Unknown)  BMI 30.11 kg/m2  Body mass index is 30.11 kg/(m^2).       GENERAL: alert and no distress  EYES: Eyes grossly normal to inspection, conjunctivae and sclerae normal  HENT: ear canals and TM's normal, throat showed mild erythema without exudate.  NECK: no adenopathy, no asymmetry, masses, or scars and thyroid normal to palpation  RESP: lungs clear to auscultation - no rales, rhonchi or wheezes  CV: regular rate and rhythm, normal S1 S2, no murmur  MS: no gross musculoskeletal defects noted, no edema      Results for orders placed or performed in visit on 02/19/18   Strep, Rapid Screen   Result Value Ref Range    Specimen Description Throat     Rapid Strep A Screen       NEGATIVE: No Group A streptococcal antigen detected by " immunoassay, await culture report.       ASSESSMENT/PLAN:     (J02.9) Viral pharyngitis  (primary encounter diagnosis)  Comment: Rapid screen was negative, awaiting culture results. Likely viral, will not treat with antibiotics.   Plan: Strep, Rapid Screen            There are no Patient Instructions on file for this visit.      Patient will follow up if symptoms worsen or do not improve. Patient instructed to call with any questions or concerns.    The information in this document, created by a scribe for me, accurately reflects the services I personally performed and the decisions made by me. I have reviewed and approved this document for accuracy. 2:58 PM 2/19/2018    Tania Sheldon MD  [facility]

## 2018-02-20 LAB
BACTERIA SPEC CULT: NORMAL
SPECIMEN SOURCE: NORMAL

## 2018-03-18 ENCOUNTER — HEALTH MAINTENANCE LETTER (OUTPATIENT)
Age: 33
End: 2018-03-18

## 2018-04-20 ENCOUNTER — OFFICE VISIT (OUTPATIENT)
Dept: OBGYN | Facility: CLINIC | Age: 33
End: 2018-04-20
Payer: COMMERCIAL

## 2018-04-20 VITALS
TEMPERATURE: 98.7 F | WEIGHT: 165 LBS | DIASTOLIC BLOOD PRESSURE: 58 MMHG | SYSTOLIC BLOOD PRESSURE: 130 MMHG | BODY MASS INDEX: 29.23 KG/M2 | HEART RATE: 61 BPM

## 2018-04-20 DIAGNOSIS — Z30.430 ENCOUNTER FOR IUD INSERTION: ICD-10-CM

## 2018-04-20 DIAGNOSIS — Z30.014 ENCOUNTER FOR INITIAL PRESCRIPTION OF INTRAUTERINE CONTRACEPTIVE DEVICE (IUD): ICD-10-CM

## 2018-04-20 LAB — HCG UR QL: NEGATIVE

## 2018-04-20 PROCEDURE — 99207 ZZC POST PARTUM EXAM: CPT | Performed by: OBSTETRICS & GYNECOLOGY

## 2018-04-20 PROCEDURE — 87624 HPV HI-RISK TYP POOLED RSLT: CPT | Performed by: OBSTETRICS & GYNECOLOGY

## 2018-04-20 PROCEDURE — 81025 URINE PREGNANCY TEST: CPT | Performed by: OBSTETRICS & GYNECOLOGY

## 2018-04-20 PROCEDURE — 58300 INSERT INTRAUTERINE DEVICE: CPT | Performed by: OBSTETRICS & GYNECOLOGY

## 2018-04-20 PROCEDURE — G0145 SCR C/V CYTO,THINLAYER,RESCR: HCPCS | Performed by: OBSTETRICS & GYNECOLOGY

## 2018-04-20 NOTE — MR AVS SNAPSHOT
After Visit Summary   4/20/2018    Sandra Steve    MRN: [id number]           Patient Information     Date Of Birth          1985        Visit Information        Provider Department      4/20/2018 9:30 AM Demetrio Cedillo MD [facility]        Today's Diagnoses     Routine postpartum follow-up    -  1    Encounter for IUD insertion        Encounter for initial prescription of intrauterine contraceptive device (IUD)           Follow-ups after your visit        Who to contact     If you have questions or need follow up information about today's clinic visit or your schedule please contact [facility] directly at [phone].  Normal or non-critical lab and imaging results will be communicated to you by MyChart, letter or phone within 4 business days after the clinic has received the results. If you do not hear from us within 7 days, please contact the clinic through TakeChargehart or phone. If you have a critical or abnormal lab result, we will notify you by phone as soon as possible.  Submit refill requests through Moolta or call your pharmacy and they will forward the refill request to us. Please allow 3 business days for your refill to be completed.          Additional Information About Your Visit        MyChart Information     Moolta gives you secure access to your electronic health record. If you see a primary care provider, you can also send messages to your care team and make appointments. If you have questions, please call your primary care clinic.  If you do not have a primary care provider, please call [phone] and they will assist you.        Care EveryWhere ID     This is your Care EveryWhere ID. This could be used by other organizations to access your [facility] medical records  [id number]        Your Vitals Were     Pulse Temperature Last Period Breastfeeding? BMI (Body Mass Index)       61 98.7  F (37.1  C) (Tympanic) (LMP Unknown) No 29.23 kg/m2         Blood Pressure from Last 3 Encounters:   04/20/18 130/58   02/19/18 100/64   01/15/18 (!) 123/91    Weight from Last 3 Encounters:   04/20/18 165 lb (74.8 kg)   02/19/18 170 lb (77.1 kg)   01/12/18 185 lb (83.9 kg)              We Performed the Following     HC LEVONORGESTREL IU 52MG 5 YR     HCG Qual, Urine - [facility],  [place], [place]  ([id number])     INSERTION INTRAUTERINE DEVICE     Pap imaged thin layer screen with HPV - recommended age 30 - 65 years (select HPV order below)          Today's Medication Changes          These changes are accurate as of 4/20/18  1:09 PM.  If you have any questions, ask your nurse or doctor.               Start taking these medicines.        Dose/Directions    levonorgestrel 20 MCG/24HR IUD   Commonly known as:  MIRENA   Used for:  Encounter for initial prescription of intrauterine contraceptive device (IUD)   Started by:  Demetrio Cedillo MD        Dose:  1 each   1 each (20 mcg) by Intrauterine route continuous   Refills:  0            Where to get your medicines      Some of these will need a paper prescription and others can be bought over the counter.  Ask your nurse if you have questions.     You don't need a prescription for these medications     levonorgestrel 20 MCG/24HR IUD                Primary Care Provider Office Phone # Fax #    Chika Love Phillips [phone] [address] [facility] [address] [place] [address]        Equal Access to Services     [facility] : Hadii gayle marsho Soliam, waaxda luqadaha, qaybta kaalmada emi, kenia hernandez. So [facility] [phone].    ATENCIÓN: Si habla español, tiene a tejeda disposición servicios gratuitos de asistencia lingüística. Llame al [phone].    We comply with applicable federal civil rights laws and [place] laws. We do not discriminate on the basis of race, color, national origin, age, disability, sex, sexual orientation, or gender identity.            Thank you!      Thank you for choosing [facility]  for your care. Our goal is always to provide you with excellent care. Hearing back from our patients is one way we can continue to improve our services. Please take a few minutes to complete the written survey that you may receive in the mail after your visit with us. Thank you!             Your Updated Medication List - Protect others around you: Learn how to safely use, store and throw away your medicines at www.disposemymeds.org.          This list is accurate as of 4/20/18  1:09 PM.  Always use your most recent med list.                   Brand Name Dispense Instructions for use Diagnosis    levonorgestrel 20 MCG/24HR IUD    MIRENA     1 each (20 mcg) by Intrauterine route continuous    Encounter for initial prescription of intrauterine contraceptive device (IUD)

## 2018-04-20 NOTE — NURSING NOTE
"Initial /58 (BP Location: Left arm, Patient Position: Chair, Cuff Size: Adult Regular)  Pulse 61  Temp 98.7  F (37.1  C) (Tympanic)  Wt 165 lb (74.8 kg)  LMP  (LMP Unknown)  Breastfeeding? No  BMI 29.23 kg/m2 Estimated body mass index is 29.23 kg/(m^2) as calculated from the following:    Height as of 2/19/18: 5' 3\" (1.6 m).    Weight as of this encounter: 165 lb (74.8 kg). .    Katty Matthews    "

## 2018-04-20 NOTE — PROGRESS NOTES
"6 week Postpartum Visit Note    S:  Sandra Steve is here for her 6-week postpartum checkup.     Delivery Date: 1/12/2018.    Delivering provider:  Urmila Cuevas MD   Type of delivery:  RLTCS .     Infant gender:  Boy \"Christophe\", weight 7 pounds 14 oz. Baby still in the NICU  Feeding Method:  Bottlefed.  Complications reported with feeding:  Tracheotomy tube inserted.    Bleeding:  None.  Menses resumed:  None  Bowel/Urinary problems:  No    PHQ-9 score: 4  Last Pap smear: NIL as of 1/2015    Contraception Planned:  IUD Mirena  She  has had intercourse since delivery and experienced  No discomfort.  .    Current tobacco use:  No  Hx of Abuse:  No  ================================================================  ROS: 10 point ROS neg other than the symptoms noted above in the HPI.     O:  EXAM:  /58 (BP Location: Left arm, Patient Position: Chair, Cuff Size: Adult Regular)  Pulse 61  Temp 98.7  F (37.1  C) (Tympanic)  Wt 165 lb (74.8 kg)  LMP  (LMP Unknown)  Breastfeeding? No  BMI 29.23 kg/m2    General: healthy, alert and no distress  Psych: negative for sleep disturbance, anxiety, nervous breakdown, depression, thoughts of self-harm, thoughts of hurting someone else and hallucinations  Breasts:  Non-tender  Abdomen: Soft, flat, non-tender  Incision:  well healed   Vulva:  Normal genitalia and Bartholin's, Urethra, [place]'s normal  Vagina:  normal with good muscle tone, without discharge and rugated  Cervix:  Multiparous,, no lesions and pink, moist, closed, without lesion or CMT.    Uterus:  fully involuted and non-tender    Adnexa:  No masses, nodularity, tenderness  Recto-vaginal:   anus normal    Procedure: IUD insertion  After informed consent was obtained from the patient, a speculum was placed in the vagina to visualized the cervix.  The cervix was then swabbed with a betadine prep.  Tenaculum was placed at the 12 o'clock position on the cervix and the uterus sounded to 9 cm.  The Mirena  IUD " was then placed in the usual fashion under sterile technique.  Strings were clipped about 2 cm from the cervical os.  Tenaculum was removed and cervix was hemostatic. There were no complications. The patient tolerated the procedure well.    NDC#:[id number] (MIRENA)      A:   32 year old  here for  6 weeks postpartum visit. Doing well.     P:  Contraception: Mirena IUD insertion  The patient should feel for the IUD strings after her next menses.  If unable to locate them, she should return to clinic for a speculum examination for confirmation that the IUD is in place. Bleeding pattern of this particular IUD was discussed with the patient. She is aware that the IUD will need to be removed in 5 years or PRN.  She is to return to clinic for her next annual or PRN.  Feeding: Bottle (formula)   Return for annual exam or PRN    Demetrio Cedillo MD  [facility]

## 2018-04-21 ASSESSMENT — PATIENT HEALTH QUESTIONNAIRE - PHQ9: SUM OF ALL RESPONSES TO PHQ QUESTIONS 1-9: 4

## 2018-04-24 LAB
COPATH REPORT: NORMAL
PAP: NORMAL

## 2018-04-24 NOTE — PROGRESS NOTES
Pap smear is normal.   Return in 3 years for repeat testing.     Demetrio Cedillo MD  [facility]

## 2018-04-26 LAB
FINAL DIAGNOSIS: NORMAL
HPV HR 12 DNA CVX QL NAA+PROBE: NEGATIVE
HPV16 DNA SPEC QL NAA+PROBE: NEGATIVE
HPV18 DNA SPEC QL NAA+PROBE: NEGATIVE
SPECIMEN DESCRIPTION: NORMAL
SPECIMEN SOURCE CVX/VAG CYTO: NORMAL

## 2018-06-25 LAB — Lab: NORMAL

## 2019-01-02 ENCOUNTER — OFFICE VISIT (OUTPATIENT)
Dept: FAMILY MEDICINE | Facility: CLINIC | Age: 34
End: 2019-01-02
Payer: COMMERCIAL

## 2019-01-02 VITALS
BODY MASS INDEX: 27.82 KG/M2 | SYSTOLIC BLOOD PRESSURE: 110 MMHG | WEIGHT: 157 LBS | HEIGHT: 63 IN | HEART RATE: 64 BPM | DIASTOLIC BLOOD PRESSURE: 76 MMHG

## 2019-01-02 DIAGNOSIS — F43.21 GRIEF REACTION: Primary | ICD-10-CM

## 2019-01-02 DIAGNOSIS — G47.00 PERSISTENT DISORDER OF INITIATING OR MAINTAINING SLEEP: ICD-10-CM

## 2019-01-02 PROCEDURE — 99214 OFFICE O/P EST MOD 30 MIN: CPT | Performed by: FAMILY MEDICINE

## 2019-01-02 RX ORDER — ESCITALOPRAM OXALATE 10 MG/1
TABLET ORAL
Qty: 30 TABLET | Refills: 1 | Status: SHIPPED | OUTPATIENT
Start: 2019-01-02 | End: 2019-02-15

## 2019-01-02 RX ORDER — TRAZODONE HYDROCHLORIDE 50 MG/1
50 TABLET, FILM COATED ORAL AT BEDTIME
Qty: 90 TABLET | Refills: 3 | Status: SHIPPED | OUTPATIENT
Start: 2019-01-02 | End: 2019-02-15

## 2019-01-02 ASSESSMENT — MIFFLIN-ST. JEOR: SCORE: 1396.28

## 2019-01-02 NOTE — PROGRESS NOTES
SUBJECTIVE:                                                    Sandra Steve is a 33 year old female who presents to clinic today for the following health issues:    Depression and Anxiety Follow-Up  Here today to discuss starting something again for mental health, tried Zoloft about year ago after son was born but had suicidal thoughts side effect.     Status since last visit: Worsened     Other associated symptoms:None    Complicating factors:     Significant life event: Yes-  Lost son 3 weeks ago, he was born with significant medical concerns.      Current substance abuse: None    PHQ 2017   PHQ-9 Total Score 4 4   Q9: Suicide Ideation Not at all Not at all     No flowsheet data found.  In the past two weeks have you had thoughts of suicide or self-harm?  No.    Do you have concerns about your personal safety or the safety of others?   No  PHQ-9  English  PHQ-9   Any Language  MENDEL-7  Suicide Assessment Five-step Evaluation and Treatment (SAFE-T)          Problem list and histories reviewed & adjusted, as indicated.  Additional history: tried zoloft was given by her ob she could not getout of bed for a week and had suicidal thoughts on it   Not sleeping   ? Tried lexapro a few years ago no side effects but didn't stay on this       Didn't think she had side effects   She is open to counseling which is needed   She thinks her  needs this also       She does have a 3 year old          Patient Active Problem List   Diagnosis     Prenatal care, subsequent pregnancy, third trimester     H/O:      Club foot of fetus affecting antepartum care of mother     Fetal macrocephaly affecting antepartum care of mother, not applicable or unspecified fetus     Previous  delivery affecting pregnancy     S/P  section     Encounter for insertion of mirena IUD     Past Surgical History:   Procedure Laterality Date     C/SECTION, LOW TRANSVERSE  2015      SECTION N/A 2018  "   Procedure:  SECTION;  Repeat  Section ;  Surgeon: Urmila Cuevas MD;  Location:  [facility]       Social History     Tobacco Use     Smoking status: Never Smoker     Smokeless tobacco: Never Used   Substance Use Topics     Alcohol use: Yes     Comment: 1 drink weekly-quit with pregnancy     Family History   Problem Relation Age of Onset     Substance Abuse Father         recovered alcohol     Depression Father      Depression Brother      Emphysema Maternal Grandfather      Cerebrovascular Disease Paternal Grandmother      Diabetes Paternal Grandfather            ROS:  Constitutional, HEENT, cardiovascular, pulmonary, gi and gu systems are negative, except as otherwise noted.    OBJECTIVE:                                                    /76   Pulse 64   Ht 1.6 m (5' 3\")   Wt 71.2 kg (157 lb)   BMI 27.81 kg/m   Body mass index is 27.81 kg/m .   GENERAL APPEARANCE: healthy, alert and no distress  MENTAL STATUS EXAM:    1. Clinical observations: Sandra was clean and was well groomed. Sandra's emotional presentation was open and cooperative. She spoke clear and articulate. She maintained good eye contact and she was cooperative in answering questions.   2. She appeared to be well-oriented in all spheres with coherent, logical, goal directed and relevent thinking.   3. Thought content: She denies no abnormal thought process.   4. Affect and mood: Kettys affect is described as normal/appropriate and tearful and her emotional attitude was open and cooperative. She reports the following sypmtoms: sad feeling or depressed, loss of appetite, loss of weight, difficulty sleeping, difficulty concentrating, drawing away from people, lack of interest or enjoyment, feeling negative about the future, less energy than usual, feeling fatiqued, too much worry and nervous or tense feeling.    5. Sensorium and cognition: She was in contact with reality and oriented to time, place and person.  She " demonstrated no impairment in immediate, recent, or remote memory. Her insight was adequate and her  intelligence appeared to be average.         ASSESSMENT/PLAN:                                                      1. Grief reaction  She will also contact the hospice team. Could not remember the groups or counselor suggestions   - escitalopram (LEXAPRO) 10 MG tablet; Take 1/2 tablet for 1-2 weeks then increase to 1 tablet  Dispense: 30 tablet; Refill: 1  - MENTAL HEALTH REFERRAL  - Adult; Outpatient Treatment; Individual/Couples/Family/Group Therapy/Health Psychology; [facility] [place]: [facility] [phone]; We will contact you to schedule the appointment or please call with any questions    2. Persistent disorder of initiating or maintaining sleep  Patient Instructions   Try the trazodone  Start with 1/2 tablet an hour before bedtime.   You can increase by 1/2 tablet every few nights to a maximum of 3 tablets.  If you are groggy the next day after changing doses try cutting back to the dose you were taking before.   Let me know if you need to try something else or if you are having side effects from this.         Start the lexapro increase after 2 weeks   Make a therapy appointment if you are having       - traZODone (DESYREL) 50 MG tablet; Take 1 tablet (50 mg) by mouth At Bedtime  Dispense: 90 tablet; Refill: 3     reports that  has never smoked. she has never used smokeless tobacco.          Sylwia Monk M.D.    [facility]

## 2019-01-02 NOTE — PATIENT INSTRUCTIONS
Try the trazodone  Start with 1/2 tablet an hour before bedtime.   You can increase by 1/2 tablet every few nights to a maximum of 3 tablets.  If you are groggy the next day after changing doses try cutting back to the dose you were taking before.   Let me know if you need to try something else or if you are having side effects from this.         Start the lexapro increase after 2 weeks   Make a therapy appointment if you are having

## 2019-02-15 ENCOUNTER — OFFICE VISIT (OUTPATIENT)
Dept: FAMILY MEDICINE | Facility: CLINIC | Age: 34
End: 2019-02-15
Payer: COMMERCIAL

## 2019-02-15 VITALS
DIASTOLIC BLOOD PRESSURE: 60 MMHG | BODY MASS INDEX: 27.29 KG/M2 | TEMPERATURE: 98 F | HEIGHT: 63 IN | RESPIRATION RATE: 12 BRPM | HEART RATE: 60 BPM | WEIGHT: 154 LBS | SYSTOLIC BLOOD PRESSURE: 100 MMHG

## 2019-02-15 DIAGNOSIS — F33.41 MAJOR DEPRESSIVE DISORDER, RECURRENT EPISODE, IN PARTIAL REMISSION (H): Primary | ICD-10-CM

## 2019-02-15 DIAGNOSIS — F41.1 GAD (GENERALIZED ANXIETY DISORDER): ICD-10-CM

## 2019-02-15 DIAGNOSIS — F43.21 GRIEF REACTION: ICD-10-CM

## 2019-02-15 DIAGNOSIS — G47.00 PERSISTENT DISORDER OF INITIATING OR MAINTAINING SLEEP: ICD-10-CM

## 2019-02-15 DIAGNOSIS — O35.09X0: ICD-10-CM

## 2019-02-15 PROCEDURE — 99214 OFFICE O/P EST MOD 30 MIN: CPT | Performed by: FAMILY MEDICINE

## 2019-02-15 RX ORDER — TRAZODONE HYDROCHLORIDE 50 MG/1
50-100 TABLET, FILM COATED ORAL AT BEDTIME
Qty: 180 TABLET | Refills: 0 | Status: SHIPPED | OUTPATIENT
Start: 2019-02-15 | End: 2021-10-19

## 2019-02-15 RX ORDER — HYDROXYZINE HYDROCHLORIDE 10 MG/1
10-40 TABLET, FILM COATED ORAL 3 TIMES DAILY PRN
Qty: 60 TABLET | Refills: 0 | Status: SHIPPED | OUTPATIENT
Start: 2019-02-15 | End: 2021-10-19

## 2019-02-15 RX ORDER — ESCITALOPRAM OXALATE 10 MG/1
10 TABLET ORAL DAILY
Qty: 90 TABLET | Refills: 1 | Status: SHIPPED | OUTPATIENT
Start: 2019-02-15 | End: 2019-04-26

## 2019-02-15 RX ORDER — BUPROPION HYDROCHLORIDE 150 MG/1
150 TABLET ORAL EVERY MORNING
Qty: 90 TABLET | Refills: 0 | Status: SHIPPED | OUTPATIENT
Start: 2019-02-15 | End: 2019-04-26

## 2019-02-15 ASSESSMENT — PATIENT HEALTH QUESTIONNAIRE - PHQ9: SUM OF ALL RESPONSES TO PHQ QUESTIONS 1-9: 8

## 2019-02-15 ASSESSMENT — MIFFLIN-ST. JEOR: SCORE: 1382.67

## 2019-02-15 ASSESSMENT — PAIN SCALES - GENERAL: PAINLEVEL: NO PAIN (0)

## 2019-02-15 NOTE — LETTER
My Depression Action Plan  Name: Sandra Steve   Date of Birth 1985  Date: 2/15/2019    My doctor: Chika Phillips   My clinic: [address] [address] [phone]          GREEN    ZONE   Good Control    What it looks like:     Things are going generally well. You have normal up s and down s. You may even feel depressed from time to time, but bad moods usually last less than a day.   What you need to do:  1. Continue to care for yourself (see self care plan)  2. Check your depression survival kit and update it as needed  3. Follow your physician s recommendations including any medication.  4. Do not stop taking medication unless you consult with your physician first.           YELLOW         ZONE Getting Worse    What it looks like:     Depression is starting to interfere with your life.     It may be hard to get out of bed; you may be starting to isolate yourself from others.    Symptoms of depression are starting to last most all day and this has happened for several days.     You may have suicidal thoughts but they are not constant.   What you need to do:     1. Call your care team, your response to treatment will improve if you keep your care team informed of your progress. Yellow periods are signs an adjustment may need to be made.     2. Continue your self-care, even if you have to fake it!    3. Talk to someone in your support network    4. Open up your depression survival kit           RED    ZONE Medical Alert - Get Help    What it looks like:     Depression is seriously interfering with your life.     You may experience these or other symptoms: You can t get out of bed most days, can t work or engage in other necessary activities, you have trouble taking care of basic hygiene, or basic responsibilities, thoughts of suicide or death that will not go away, self-injurious behavior.     What you need to do:  1. Call your care team and request a  same-day appointment. If they are not available (weekends or after hours) call your local crisis line, emergency room or 911.            Depression Self Care Plan / Survival Kit    Self-Care for Depression  Here s the deal. Your body and mind are really not as separate as most people think.  What you do and think affects how you feel and how you feel influences what you do and think. This means if you do things that people who feel good do, it will help you feel better.  Sometimes this is all it takes.  There is also a place for medication and therapy depending on how severe your depression is, so be sure to consult with your medical provider and/ or Behavioral Health Consultant if your symptoms are worsening or not improving.     In order to better manage my stress, I will:    Exercise  Get some form of exercise, every day. This will help reduce pain and release endorphins, the  feel good  chemicals in your brain. This is almost as good as taking antidepressants!  This is not the same as joining a gym and then never going! (they count on that by the way ) It can be as simple as just going for a walk or doing some gardening, anything that will get you moving.      Hygiene   Maintain good hygiene (Get out of bed in the morning, Make your bed, Brush your teeth, Take a shower, and Get dressed like you were going to work, even if you are unemployed).  If your clothes don't fit try to get ones that do.    Diet  I will strive to eat foods that are good for me, drink plenty of water, and avoid excessive sugar, caffeine, alcohol, and other mood-altering substances.  Some foods that are helpful in depression are: complex carbohydrates, B vitamins, flaxseed, fish or fish oil, fresh fruits and vegetables.    Psychotherapy  I agree to participate in Individual Therapy (if recommended).    Medication  If prescribed medications, I agree to take them.  Missing doses can result in serious side effects.  I understand that drinking  alcohol, or other illicit drug use, may cause potential side effects.  I will not stop my medication abruptly without first discussing it with my provider.    Staying Connected With Others  I will stay in touch with my friends, family members, and my primary care provider/team.    Use your imagination  Be creative.  We all have a creative side; it doesn t matter if it s oil painting, sand castles, or mud pies! This will also kick up the endorphins.    Witness Beauty  (AKA stop and smell the roses) Take a look outside, even in mid-winter. Notice colors, textures. Watch the squirrels and birds.     Service to others  Be of service to others.  There is always someone else in need.  By helping others we can  get out of ourselves  and remember the really important things.  This also provides opportunities for practicing all the other parts of the program.    Humor  Laugh and be silly!  Adjust your TV habits for less news and crime-drama and more comedy.    Control your stress  Try breathing deep, massage therapy, biofeedback, and meditation. Find time to relax each day.     My support system    Clinic Contact:  Phone number:    Contact 1:  Phone number:    Contact 2:  Phone number:    Gnosticism/:  Phone number:    Therapist:  Phone number:    Local crisis center:    Phone number:    Other community support:  Phone number:

## 2019-02-15 NOTE — PROGRESS NOTES
SUBJECTIVE:   Sandra Steve is a 33 year old female who presents to clinic today for the following health issues:      Depression Followup    Status since last visit: Worsened more panic attacks    See PHQ-9 for current symptoms.  Other associated symptoms: None    Complicating factors:   Significant life event:  Yes-  Death of son   Current substance abuse:  None  Anxiety or Panic symptoms:  Yes-  Panic Sx's    PHQ 2017 2018 2/15/2019   PHQ-9 Total Score 4 4 8   Q9: Suicide Ideation Not at all Not at all Not at all       PHQ-9  English  PHQ-9   Any Language  Suicide Assessment Five-step Evaluation and Treatment (SAFE-T)    Amount of exercise or physical activity: 2-3 days/week for an average of 45-60 minutes    Problems taking medications regularly: No    Medication side effects: none    Diet: regular (no restrictions)    Panic attacks nightly and middle of the night    On lexapro  - started 6 weeks ago - taking 10mg   Feels it is helping during the day - feeling more positive and hopeful   No si/hi     On trazodone at night - she using 1-2 tablets at night, two works well     Tried zoloft in the past- didn't work     Wondering about a prn med for anxiety     Hasn't done any counseling yet    son  last year     Review of systems:  No headache  No tremor   No cp/sob    Problem list and histories reviewed & adjusted, as indicated.  Additional history: as documented    Patient Active Problem List   Diagnosis     Prenatal care, subsequent pregnancy, third trimester     H/O:      Club foot of fetus affecting antepartum care of mother     Fetal macrocephaly affecting antepartum care of mother, not applicable or unspecified fetus     Previous  delivery affecting pregnancy     S/P  section     Encounter for insertion of mirena IUD     MENDEL (generalized anxiety disorder)     Major depressive disorder, recurrent episode, in partial remission (H)     Grief reaction      "Persistent disorder of initiating or maintaining sleep     Current Outpatient Medications   Medication     buPROPion (WELLBUTRIN XL) 150 MG 24 hr tablet     escitalopram (LEXAPRO) 10 MG tablet     hydrOXYzine (ATARAX) 10 MG tablet     levonorgestrel (MIRENA) 20 MCG/24HR IUD     traZODone (DESYREL) 50 MG tablet     No current facility-administered medications for this visit.       No Known Allergies    /60 (BP Location: Right arm, Patient Position: Chair, Cuff Size: Adult Regular)   Pulse 60   Temp 98  F (36.7  C) (Tympanic)   Resp 12   Ht 1.6 m (5' 3\")   Wt 69.9 kg (154 lb)   LMP  (LMP Unknown)   Breastfeeding? No   BMI 27.28 kg/m    GENERAL - Pt is alert and oriented in no acute distress.  Affect is appropriate. Good eye contact.  PSYCH - Pt is clean and well-dressed. Oriented to person,place,and time. Cooperative. No speech abnormalities. No psychomotor agitation or retardation.  Thought process was normal and thought content was free of suicidal/homicidal ideation, obsessions, and delusions. Insight and judgement were good.      Assessment/Plan -  (F33.41) Major depressive disorder, recurrent episode, in partial remission (H)  (primary encounter diagnosis)  Comment: discussion around MDD, MENDEL, and grief reaction. Will continue lexapro and augment with wellbutrin. Will try atarax for anxiety prn, use higher dose at bedtime and hold trazodone for now. Send me a mychart in a week and let me know how it is going.  Schedule counseling. The patient indicates understanding of these issues and agrees with the plan.   Plan:     (O35.0XX0) Fetal macrocephaly affecting antepartum care of mother, not applicable or unspecified fetus  Comment: son  last year. She is grieving   Plan: DEPRESSION ACTION PLAN (DAP), buPROPion         (WELLBUTRIN XL) 150 MG 24 hr tablet,         hydrOXYzine (ATARAX) 10 MG tablet            (F43.21) Grief reaction  Comment:  Will schedule with counselor   Plan: escitalopram " (LEXAPRO) 10 MG tablet, buPROPion         (WELLBUTRIN XL) 150 MG 24 hr tablet,         hydrOXYzine (ATARAX) 10 MG tablet            (G47.00) Persistent disorder of initiating or maintaining sleep  Comment:   Plan: buPROPion (WELLBUTRIN XL) 150 MG 24 hr tablet,         hydrOXYzine (ATARAX) 10 MG tablet, traZODone         (DESYREL) 50 MG tablet            (F41.1) MENDEL (generalized anxiety disorder)  Comment: see above   Plan:     LULI Mcgowan MD

## 2019-03-06 ENCOUNTER — MYC MEDICAL ADVICE (OUTPATIENT)
Dept: FAMILY MEDICINE | Facility: CLINIC | Age: 34
End: 2019-03-06

## 2019-04-26 ENCOUNTER — OFFICE VISIT (OUTPATIENT)
Dept: FAMILY MEDICINE | Facility: CLINIC | Age: 34
End: 2019-04-26
Payer: COMMERCIAL

## 2019-04-26 VITALS
DIASTOLIC BLOOD PRESSURE: 65 MMHG | RESPIRATION RATE: 14 BRPM | SYSTOLIC BLOOD PRESSURE: 121 MMHG | HEIGHT: 63 IN | HEART RATE: 57 BPM | WEIGHT: 160.1 LBS | BODY MASS INDEX: 28.37 KG/M2 | TEMPERATURE: 98.3 F

## 2019-04-26 DIAGNOSIS — F43.21 GRIEF REACTION: ICD-10-CM

## 2019-04-26 DIAGNOSIS — F33.41 MAJOR DEPRESSIVE DISORDER, RECURRENT EPISODE, IN PARTIAL REMISSION (H): Primary | ICD-10-CM

## 2019-04-26 DIAGNOSIS — G47.00 PERSISTENT DISORDER OF INITIATING OR MAINTAINING SLEEP: ICD-10-CM

## 2019-04-26 PROBLEM — Z34.83 PRENATAL CARE, SUBSEQUENT PREGNANCY, THIRD TRIMESTER: Status: RESOLVED | Noted: 2017-05-22 | Resolved: 2019-04-26

## 2019-04-26 PROBLEM — O34.219 PREVIOUS CESAREAN DELIVERY AFFECTING PREGNANCY: Status: RESOLVED | Noted: 2018-01-12 | Resolved: 2019-04-26

## 2019-04-26 PROBLEM — O35.HXX0 CLUB FOOT OF FETUS AFFECTING ANTEPARTUM CARE OF MOTHER: Status: RESOLVED | Noted: 2017-09-11 | Resolved: 2019-04-26

## 2019-04-26 PROCEDURE — 99213 OFFICE O/P EST LOW 20 MIN: CPT | Performed by: FAMILY MEDICINE

## 2019-04-26 RX ORDER — ESCITALOPRAM OXALATE 20 MG/1
20 TABLET ORAL DAILY
Qty: 90 TABLET | Refills: 3 | Status: SHIPPED | OUTPATIENT
Start: 2019-04-26 | End: 2021-10-19

## 2019-04-26 RX ORDER — BUPROPION HYDROCHLORIDE 150 MG/1
150 TABLET ORAL EVERY MORNING
Qty: 90 TABLET | Refills: 3 | Status: SHIPPED | OUTPATIENT
Start: 2019-04-26 | End: 2021-10-19

## 2019-04-26 ASSESSMENT — ANXIETY QUESTIONNAIRES
1. FEELING NERVOUS, ANXIOUS, OR ON EDGE: SEVERAL DAYS
7. FEELING AFRAID AS IF SOMETHING AWFUL MIGHT HAPPEN: SEVERAL DAYS
GAD7 TOTAL SCORE: 5
2. NOT BEING ABLE TO STOP OR CONTROL WORRYING: SEVERAL DAYS
3. WORRYING TOO MUCH ABOUT DIFFERENT THINGS: SEVERAL DAYS
6. BECOMING EASILY ANNOYED OR IRRITABLE: SEVERAL DAYS
5. BEING SO RESTLESS THAT IT IS HARD TO SIT STILL: NOT AT ALL

## 2019-04-26 ASSESSMENT — MIFFLIN-ST. JEOR: SCORE: 1410.34

## 2019-04-26 ASSESSMENT — PATIENT HEALTH QUESTIONNAIRE - PHQ9
5. POOR APPETITE OR OVEREATING: NOT AT ALL
SUM OF ALL RESPONSES TO PHQ QUESTIONS 1-9: 5

## 2019-04-26 NOTE — PROGRESS NOTES
"  SUBJECTIVE:   Sandra Steve is a 33 year old female who presents to clinic today for the following   health issues:    F/u for MDD and MENDEL  -   Was seen in clinic on 2/15/19 and started wellbutrin.   Continued on lexapro.   Atarax given for prn symptoms and to try at night     Today -   lexapro 20 and wellbutrin 150 - on both for 6 weeks   Mood is better   Motivated and hopeless   Feeling good. Wants to continue on current meds     Hasn't needed the atarax for anxiety  Sleep has improved. Not using any meds     Counselor - helping   Plans to continue through the summer     Review of systems:  No headache   No n/v     Additional history: as documented      Patient Active Problem List   Diagnosis     Prenatal care, subsequent pregnancy, third trimester     H/O:      Club foot of fetus affecting antepartum care of mother     Fetal macrocephaly affecting antepartum care of mother, not applicable or unspecified fetus     Previous  delivery affecting pregnancy     S/P  section     Encounter for insertion of mirena IUD     MENDEL (generalized anxiety disorder)     Major depressive disorder, recurrent episode, in partial remission (H)     Grief reaction     Persistent disorder of initiating or maintaining sleep     Current Outpatient Medications   Medication     buPROPion (WELLBUTRIN XL) 150 MG 24 hr tablet     escitalopram (LEXAPRO) 10 MG tablet     hydrOXYzine (ATARAX) 10 MG tablet     levonorgestrel (MIRENA) 20 MCG/24HR IUD     traZODone (DESYREL) 50 MG tablet     No current facility-administered medications for this visit.      .   No Known Allergies      /65 (BP Location: Right arm, Patient Position: Sitting, Cuff Size: Adult Regular)   Pulse 57   Temp 98.3  F (36.8  C) (Tympanic)   Resp 14   Ht 1.6 m (5' 3\")   Wt 72.6 kg (160 lb 1.6 oz)   BMI 28.36 kg/m    GENERAL - Pt is alert and oriented in no acute distress.  Affect is appropriate. Good eye contact.  PSYCH - Pt is clean and " well-dressed. Oriented to person,place,and time. Cooperative. No speech abnormalities. No psychomotor agitation or retardation.  Thought process was normal and thought content was free of suicidal/homicidal ideation, obsessions, and delusions. Insight and judgement were good.      Assessment/Plan -    (F33.41) Major depressive disorder, recurrent episode, in partial remission (H)  (primary encounter diagnosis)  Comment: meds are working well for her. Continue with counseling. meds refilled. The patient indicates understanding of these issues and agrees with the plan.   Plan:  buPROPion (WELLBUTRIN XL) 150 MG 24 hr tablet,         escitalopram (LEXAPRO) 20 MG tablet              (F43.21) Grief reaction  Comment:   Plan: buPROPion (WELLBUTRIN XL) 150 MG 24 hr tablet,         escitalopram (LEXAPRO) 20 MG tablet            (G47.00) Persistent disorder of initiating or maintaining sleep  Comment:  Not using any sleep meds now but will leave them on her med list as prn   Plan: buPROPion (WELLBUTRIN XL) 150 MG 24 hr tablet,         escitalopram (LEXAPRO) 20 MG tablet          F/u in one year for well exam or sooner prn     LULI Mcgowan MD

## 2019-04-27 ASSESSMENT — ANXIETY QUESTIONNAIRES: GAD7 TOTAL SCORE: 5

## 2019-06-17 DIAGNOSIS — F43.21 GRIEF REACTION: ICD-10-CM

## 2019-06-17 DIAGNOSIS — O35.09X0: ICD-10-CM

## 2019-06-17 DIAGNOSIS — G47.00 PERSISTENT DISORDER OF INITIATING OR MAINTAINING SLEEP: ICD-10-CM

## 2019-06-17 RX ORDER — BUPROPION HYDROCHLORIDE 150 MG/1
TABLET ORAL
Qty: 90 TABLET | Refills: 0 | OUTPATIENT
Start: 2019-06-17

## 2019-06-17 NOTE — TELEPHONE ENCOUNTER
"BUPROPION HCL ER (XL) 150MG TB24      Last Written Prescription Date:  4/26/19  Last Fill Quantity: 90,   # refills: 3  Last Office Visit: 6/3/19  Future Office visit:       Requested Prescriptions   Pending Prescriptions Disp Refills     buPROPion (WELLBUTRIN XL) 150 MG 24 hr tablet [Pharmacy Med Name: BUPROPION HCL ER (XL) 150MG TB24] 90 tablet 0     Sig: TAKE ONE TABLET BY MOUTH EVERY MORNING       SSRIs Protocol Passed - 6/17/2019  3:58 PM        Passed - Recent (12 mo) or future (30 days) visit within the authorizing provider's specialty     Patient had office visit in the last 12 months or has a visit in the next 30 days with authorizing provider or within the authorizing provider's specialty.  See \"Patient Info\" tab in inbasket, or \"Choose Columns\" in Meds & Orders section of the refill encounter.              Passed - Medication is Bupropion     If the medication is Bupropion (Wellbutrin), and the patient is taking for smoking cessation; OK to refill.          Passed - Medication is active on med list        Passed - Patient is age 18 or older        Passed - No active pregnancy on record        Passed - No positive pregnancy test in last 12 months          "

## 2019-06-17 NOTE — TELEPHONE ENCOUNTER
There's a script on file. Deleting request.     See Randy  Pharmacy Technician, Nate  [facility]  Phone: [phone]  Fax: [phone]

## 2019-09-16 ENCOUNTER — OFFICE VISIT (OUTPATIENT)
Dept: FAMILY MEDICINE | Facility: CLINIC | Age: 34
End: 2019-09-16
Payer: COMMERCIAL

## 2019-09-16 VITALS
RESPIRATION RATE: 20 BRPM | BODY MASS INDEX: 29.09 KG/M2 | WEIGHT: 164.2 LBS | HEIGHT: 63 IN | SYSTOLIC BLOOD PRESSURE: 106 MMHG | DIASTOLIC BLOOD PRESSURE: 64 MMHG | HEART RATE: 64 BPM | OXYGEN SATURATION: 98 % | TEMPERATURE: 99.3 F

## 2019-09-16 DIAGNOSIS — Z23 NEED FOR PROPHYLACTIC VACCINATION AND INOCULATION AGAINST INFLUENZA: ICD-10-CM

## 2019-09-16 DIAGNOSIS — L70.0 ACNE VULGARIS: Primary | ICD-10-CM

## 2019-09-16 PROCEDURE — 90686 IIV4 VACC NO PRSV 0.5 ML IM: CPT | Performed by: PHYSICIAN ASSISTANT

## 2019-09-16 PROCEDURE — 90471 IMMUNIZATION ADMIN: CPT | Performed by: PHYSICIAN ASSISTANT

## 2019-09-16 PROCEDURE — 99213 OFFICE O/P EST LOW 20 MIN: CPT | Mod: 25 | Performed by: PHYSICIAN ASSISTANT

## 2019-09-16 RX ORDER — BENZOYL PEROXIDE 5 G/100G
GEL TOPICAL DAILY
Qty: 60 G | Refills: 1 | Status: SHIPPED | OUTPATIENT
Start: 2019-09-16 | End: 2022-03-18

## 2019-09-16 RX ORDER — MUPIROCIN CALCIUM 20 MG/G
CREAM TOPICAL 2 TIMES DAILY
Qty: 15 G | Refills: 0 | Status: SHIPPED | OUTPATIENT
Start: 2019-09-16 | End: 2019-09-21

## 2019-09-16 ASSESSMENT — MIFFLIN-ST. JEOR: SCORE: 1423.94

## 2019-09-16 NOTE — PROGRESS NOTES
"Subjective     Sandra Steve is a 34 year old female who presents to clinic today for the following health issues:    HPI   Rash  Onset: 2 days    Description:   Location: Chin  Character: raised, flakey, draining yesterday, today it is dried up.    Itching (Pruritis): YES    Progression of Symptoms:  worsening    Accompanying Signs & Symptoms:  Fever: no   Body aches or joint pain: no   Sore throat symptoms: no   Recent cold symptoms: no     History:   Previous similar rash: YES    Precipitating factors:   Exposure to similar rash: no   New exposures: None   Recent travel: no     Alleviating factors:  None    Therapies Tried and outcome: None    Concerns of impetigo.    Maggy Lorenzo [facility]          Reviewed and updated as needed this visit by Provider         Review of Systems   ROS COMP: Constitutional, HEENT, cardiovascular, pulmonary, gi and gu systems are negative, except as otherwise noted.      Objective    /64   Pulse 64   Temp 99.3  F (37.4  C) (Tympanic)   Resp 20   Ht 1.6 m (5' 3\")   Wt 74.5 kg (164 lb 3.2 oz)   LMP  (LMP Unknown)   SpO2 98%   Breastfeeding? No   BMI 29.09 kg/m    Body mass index is 29.09 kg/m .  Physical Exam   GENERAL: healthy, alert and no distress  SKIN: no suspicious lesions or rashes. A dry, flaking papule noted on chin.  No surrounding erythema.  No yellow crust.  No other lesions.             Assessment & Plan     1. Acne vulgaris      Acne care discussed.  I suggest daily skin cleansing with a mild face wash/benzoyl peroxide . May use bactroban for a few days to help clear this up faster.      Will start with topical gel, see epic for orders.    Follow up in four to six weeks if symptoms not significantly improved.  Cautioned that symptoms may worsen before improving.  Use of non-comedogenic cosmetics and cleansers recommended.  Risk, benefit, intended purposes and side effect of medication discussed.     - mupirocin (BACTROBAN) 2 % external cream; Apply topically 2 " "times daily for 5 days  Dispense: 15 g; Refill: 0  - benzoyl peroxide 5 % topical gel; Apply topically daily Wash, rinse and dry before applying; may bleach hair, fabric.  Dispense: 60 g; Refill: 1    2. Need for prophylactic vaccination and inoculation against influenza  due  - INFLUENZA VACCINE IM > 6 MONTHS VALENT IIV4 [31027]     BMI:   Estimated body mass index is 29.09 kg/m  as calculated from the following:    Height as of this encounter: 1.6 m (5' 3\").    Weight as of this encounter: 74.5 kg (164 lb 3.2 oz).               Return in about 2 weeks (around 9/30/2019) for a recheck if symptoms do not improve.    Alejandra Westfall PA-C  [facility]        "

## 2020-03-10 ENCOUNTER — HEALTH MAINTENANCE LETTER (OUTPATIENT)
Age: 35
End: 2020-03-10

## 2020-12-27 ENCOUNTER — HEALTH MAINTENANCE LETTER (OUTPATIENT)
Age: 35
End: 2020-12-27

## 2021-03-24 ENCOUNTER — THERAPY VISIT (OUTPATIENT)
Dept: PHYSICAL THERAPY | Facility: CLINIC | Age: 36
End: 2021-03-24
Payer: COMMERCIAL

## 2021-03-24 DIAGNOSIS — H81.12 BENIGN PAROXYSMAL POSITIONAL VERTIGO OF LEFT EAR: Primary | ICD-10-CM

## 2021-03-24 PROCEDURE — 97161 PT EVAL LOW COMPLEX 20 MIN: CPT | Mod: GP | Performed by: PHYSICAL THERAPIST

## 2021-03-24 PROCEDURE — 95992 CANALITH REPOSITIONING PROC: CPT | Mod: GP | Performed by: PHYSICAL THERAPIST

## 2021-03-24 NOTE — PROGRESS NOTES
Physical Therapy Initial Evaluation  Subjective:  HPI                    Objective:  System    Physical Exam    General     ROS  S:  Sandra is a 35 year old patient complaining of vertigo.  She woke up on Saturday with a headache and then turned into dizziness.  She reports constant dizziness and spinning.  She feels nausea as well. She did go to ER yesterday and was given Meclazine and Zofran  Onset of symptoms: Saturday 3/20/2021      Is this a recurrent problem: no   Progression since onset: staying the same  Frequency of episodes/time of day: constant  Duration of episodes: dizziness seems constant   Exacerbating factors: she feels off balance walking, difficulty with reading, feels dizzy even laying in bed, but not sure if any particular movements increase dizziness.  Relieving factors: laying still  Previous treatments:  -  Special tests/diagnostics performed: -  Significant medical hx: anxiety, depression  Meds: Meclazine, Zofran, anti-depressant  Occupation:  director    General health reported by patient: good  Red Flags: none      O:  Cervical ROM screen: normal  Oculomotor/gaze stability screen: horizontal gaze stability increased dizziness, No gaze evoked nystagmus but did become dizzy and headache increased with gaze to the left, normal on right.    Hallpike-Stiven maneuver:  R: negative  L: positive for spinning feeling of dizziness, no nystagmus noted  Horizontal canal test:  R: nt  L: nt  Balance testing:  Could do SLS 15 sec each leg, felt off-balance per her report.    Assessment/Plan:    Patient is a 35 year old female with vertigo complaints.    Patient has the following significant findings with corresponding treatment plan.                Diagnosis 1:  BPPV  Impaired balance - canalith repositioning    Therapy Evaluation Codes:   1) History comprised of:   Personal factors that impact the plan of care:      Anxiety.    Comorbidity factors that impact the plan of care are:       None.     Medications impacting care: Anti-depressant and Meclazine.  2) Examination of Body Systems comprised of:   Body structures and functions that impact the plan of care:      Head.   Activity limitations that impact the plan of care are:      Bending, Walking, Working and Laying down.  3) Clinical presentation characteristics are:   Stable/Uncomplicated.  4) Decision-Making    Low complexity using standardized patient assessment instrument and/or measureable assessment of functional outcome.  Cumulative Therapy Evaluation is: Low complexity.    Previous and current functional limitations:  (See Goal Flow Sheet for this information)    Short term and Long term goals: (See Goal Flow Sheet for this information)     Communication ability:  Patient appears to be able to clearly communicate and understand verbal and written communication and follow directions correctly.  Treatment Explanation - The following has been discussed with the patient:   RX ordered/plan of care  Anticipated outcomes  Possible risks and side effects  This patient would benefit from PT intervention to resume normal activities.   Rehab potential is excellent.    Frequency:  1 X week, once daily  Duration:  for 2 weeks  Discharge Plan:  Achieve all LTG.  Independent in home treatment program.  Reach maximal therapeutic benefit.    Please refer to the daily flowsheet for treatment today, total treatment time and time spent performing 1:1 timed codes.

## 2021-04-24 ENCOUNTER — HEALTH MAINTENANCE LETTER (OUTPATIENT)
Age: 36
End: 2021-04-24

## 2021-06-02 ENCOUNTER — RECORDS - HEALTHEAST (OUTPATIENT)
Dept: ADMINISTRATIVE | Facility: CLINIC | Age: 36
End: 2021-06-02

## 2021-10-09 ENCOUNTER — HEALTH MAINTENANCE LETTER (OUTPATIENT)
Age: 36
End: 2021-10-09

## 2021-10-19 ENCOUNTER — VIRTUAL VISIT (OUTPATIENT)
Dept: OBGYN | Facility: CLINIC | Age: 36
End: 2021-10-19
Payer: COMMERCIAL

## 2021-10-19 DIAGNOSIS — F41.9 RECURRENT SEVERE MAJOR DEPRESSIVE DISORDER WITH ANXIETY (H): Primary | ICD-10-CM

## 2021-10-19 DIAGNOSIS — F33.2 RECURRENT SEVERE MAJOR DEPRESSIVE DISORDER WITH ANXIETY (H): Primary | ICD-10-CM

## 2021-10-19 PROCEDURE — 99204 OFFICE O/P NEW MOD 45 MIN: CPT | Mod: TEL | Performed by: OBSTETRICS & GYNECOLOGY

## 2021-10-19 RX ORDER — BUPROPION HYDROCHLORIDE 150 MG/1
150 TABLET ORAL EVERY MORNING
Qty: 90 TABLET | Refills: 0 | Status: SHIPPED | OUTPATIENT
Start: 2021-10-19 | End: 2022-05-05

## 2021-10-19 ASSESSMENT — PATIENT HEALTH QUESTIONNAIRE - PHQ9
SUM OF ALL RESPONSES TO PHQ QUESTIONS 1-9: 23
5. POOR APPETITE OR OVEREATING: NEARLY EVERY DAY

## 2021-10-19 ASSESSMENT — ANXIETY QUESTIONNAIRES
IF YOU CHECKED OFF ANY PROBLEMS ON THIS QUESTIONNAIRE, HOW DIFFICULT HAVE THESE PROBLEMS MADE IT FOR YOU TO DO YOUR WORK, TAKE CARE OF THINGS AT HOME, OR GET ALONG WITH OTHER PEOPLE: EXTREMELY DIFFICULT
GAD7 TOTAL SCORE: 20
5. BEING SO RESTLESS THAT IT IS HARD TO SIT STILL: NEARLY EVERY DAY
6. BECOMING EASILY ANNOYED OR IRRITABLE: NEARLY EVERY DAY
2. NOT BEING ABLE TO STOP OR CONTROL WORRYING: NEARLY EVERY DAY
1. FEELING NERVOUS, ANXIOUS, OR ON EDGE: NEARLY EVERY DAY
7. FEELING AFRAID AS IF SOMETHING AWFUL MIGHT HAPPEN: NEARLY EVERY DAY
3. WORRYING TOO MUCH ABOUT DIFFERENT THINGS: MORE THAN HALF THE DAYS

## 2021-10-19 NOTE — PROGRESS NOTES
Sandra is a 36 year old who is being evaluated via a billable telephone visit.      What phone number would you like to be contacted at? [phone]    How would you like to obtain your AVS? Hay    Assessment & Plan     (F33.2,  F41.9) Recurrent severe major depressive disorder with anxiety (H)  (primary encounter diagnosis)  Comment: discussed importance of balancing medication therapy with behavioral therapy.  Her symptoms are more acute given her recent job stress and her ongoing grief symptoms.  Since she has been off medication for many months, she is significantly under treated.  She didn't tolerate Zoloft (thought to give her vertigo) and she didn't like how trazodone made her feel.  She doesn't recall any significant issues with wellbutrin or lexapro aside from them not working effectively.  However, she was never maximized on any particular dosing.      Will resume her wellbutrin and start with monotherapy since she has a lot of anhedonia/lack of inertia symptoms.  Will titrate up accordingly while she is awaiting referral to psychiatry.      Plan: MENTAL HEALTH REFERRAL  - Adult; Psychiatry;         Psychiatry; [facility] [phone]; We will contact you to schedule the         appointment or please call with any questions    wellbutrin  mg po every day: RX sent.              30 minutes spent on the date of the encounter doing chart review, patient visit and documentation       Phone call duration: 20 minutes       Depression Screening Follow Up    PHQ 10/19/2021   PHQ-9 Total Score 23   Q9: Thoughts of better off dead/self-harm past 2 weeks Several days     Follow Up      Follow Up Actions Taken  Crisis resource information provided in the After Visit Summary  Referred patient back to mental health provider  Mental Health Referral placed    Discussed the following ways the patient can remain in a safe environment:  be around others  MEDICATIONS:        - Start taking  "wellbutrin  CONSULTATION/REFERRAL to Psychiatry      Chika Phillips MD  [facility]    Alpa Flores is a 36 year old who presents for the following health issues:    HPI     Depression and Anxiety Follow-Up    How are you doing with your depression since your last visit? Worsened     How are you doing with your anxiety since your last visit?  Worsened     Are you having other symptoms that might be associated with depression or anxiety? No    Have you had a significant life event? Grief or Loss     Do you have any concerns with your use of alcohol or other drugs? No     Patient has been struggling since the death of her son, Christophe, in 2018.      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  She is actively looking for alternative work.  She feels \"triggered\" but the increase in work as it reminds her of how she felt after the death of her son.      She has thoughts of suicidality, but has no intention or plan.  She states she would never actually go through with a plan or take action.      Her relationship with her  is somewhat strained \"since they are grieving differently\" and \"he is already over it and I'm not\".      Her therapist recommended that she seek additional medical attention because she does not feel that therapy alone will help her.     She has been off anti-depressants due to side effects from Zoloft (vertigo 3/2021).   She has been on wellbutrin and lexapro and trazodone in the past with variable results.  She was not maxed out on any one particular medication.     Social History     Tobacco Use     Smoking status: Never Smoker     Smokeless tobacco: Never Used   Vaping Use     Vaping Use: Never used   Substance Use Topics     Alcohol use: Yes     Comment: 1 drink weekly-quit with pregnancy     Drug use: No     PHQ 2/15/2019 4/26/2019 10/19/2021   PHQ-9 Total " Score 8 5 23   Q9: Thoughts of better off dead/self-harm past 2 weeks Not at all Not at all Several days     MENDEL-7 SCORE 4/26/2019 10/19/2021   Total Score 5 20     Last PHQ-9 10/19/2021   1.  Little interest or pleasure in doing things 3   2.  Feeling down, depressed, or hopeless 2   3.  Trouble falling or staying asleep, or sleeping too much 2   4.  Feeling tired or having little energy 3   5.  Poor appetite or overeating 3   6.  Feeling bad about yourself 3   7.  Trouble concentrating 3   8.  Moving slowly or restless 3   Q9: Thoughts of better off dead/self-harm past 2 weeks 1   PHQ-9 Total Score 23   Difficulty at work, home, or with people Extremely dIfficult     MENDEL-7  10/19/2021   1. Feeling nervous, anxious, or on edge 3   2. Not being able to stop or control worrying 3   3. Worrying too much about different things 2   4. Trouble relaxing 3   5. Being so restless that it is hard to sit still 3   6. Becoming easily annoyed or irritable 3   7. Feeling afraid, as if something awful might happen 3   MENDEL-7 Total Score 20   If you checked any problems, how difficult have they made it for you to do your work, take care of things at home, or get along with other people? Extremely difficult           Objective           Vitals:  No vitals were obtained today due to virtual visit.    Physical Exam   healthy, alert and mild distress  PSYCH: Alert and oriented times 3; coherent speech, normal   rate and volume, able to articulate logical thoughts, able   to abstract reason, no tangential thoughts, no hallucinations   or delusions  Her affect is pleasant, sad but able to occasionally laugh during our conversation.   RESP: No cough, no audible wheezing, able to talk in full sentences  Remainder of exam unable to be completed due to telephone visits    Chika Phillips M.D.

## 2021-10-20 ASSESSMENT — ANXIETY QUESTIONNAIRES: GAD7 TOTAL SCORE: 20

## 2022-03-18 ENCOUNTER — OFFICE VISIT (OUTPATIENT)
Dept: OBGYN | Facility: CLINIC | Age: 37
End: 2022-03-18
Payer: COMMERCIAL

## 2022-03-18 ENCOUNTER — OFFICE VISIT (OUTPATIENT)
Dept: FAMILY MEDICINE | Facility: CLINIC | Age: 37
End: 2022-03-18
Payer: COMMERCIAL

## 2022-03-18 VITALS
WEIGHT: 150 LBS | RESPIRATION RATE: 16 BRPM | HEART RATE: 48 BPM | TEMPERATURE: 97 F | SYSTOLIC BLOOD PRESSURE: 90 MMHG | BODY MASS INDEX: 26.57 KG/M2 | DIASTOLIC BLOOD PRESSURE: 70 MMHG

## 2022-03-18 VITALS
DIASTOLIC BLOOD PRESSURE: 70 MMHG | RESPIRATION RATE: 16 BRPM | BODY MASS INDEX: 26.58 KG/M2 | OXYGEN SATURATION: 99 % | HEIGHT: 63 IN | HEART RATE: 45 BPM | WEIGHT: 150 LBS | SYSTOLIC BLOOD PRESSURE: 90 MMHG | TEMPERATURE: 97 F

## 2022-03-18 DIAGNOSIS — Z30.433 ENCOUNTER FOR REMOVAL AND REINSERTION OF INTRAUTERINE CONTRACEPTIVE DEVICE: ICD-10-CM

## 2022-03-18 DIAGNOSIS — I49.3 PVC'S (PREMATURE VENTRICULAR CONTRACTIONS): Primary | ICD-10-CM

## 2022-03-18 DIAGNOSIS — Z12.4 CERVICAL CANCER SCREENING: Primary | ICD-10-CM

## 2022-03-18 DIAGNOSIS — Z30.430 ENCOUNTER FOR INSERTION OF MIRENA IUD: ICD-10-CM

## 2022-03-18 DIAGNOSIS — Z11.3 SCREEN FOR STD (SEXUALLY TRANSMITTED DISEASE): ICD-10-CM

## 2022-03-18 DIAGNOSIS — R00.2 PALPITATIONS: ICD-10-CM

## 2022-03-18 PROBLEM — Z98.891 S/P CESAREAN SECTION: Status: RESOLVED | Noted: 2018-01-12 | Resolved: 2022-03-18

## 2022-03-18 PROBLEM — O35.09X0: Status: RESOLVED | Noted: 2017-10-17 | Resolved: 2022-03-18

## 2022-03-18 LAB
ANION GAP SERPL CALCULATED.3IONS-SCNC: 5 MMOL/L (ref 3–14)
BUN SERPL-MCNC: 14 MG/DL (ref 7–30)
CALCIUM SERPL-MCNC: 9.7 MG/DL (ref 8.5–10.1)
CHLORIDE BLD-SCNC: 104 MMOL/L (ref 94–109)
CO2 SERPL-SCNC: 29 MMOL/L (ref 20–32)
CREAT SERPL-MCNC: 0.9 MG/DL (ref 0.52–1.04)
ERYTHROCYTE [DISTWIDTH] IN BLOOD BY AUTOMATED COUNT: 12.4 % (ref 10–15)
GFR SERPL CREATININE-BSD FRML MDRD: 86 ML/MIN/1.73M2
GLUCOSE BLD-MCNC: 81 MG/DL (ref 70–99)
HCT VFR BLD AUTO: 42.7 % (ref 35–47)
HGB BLD-MCNC: 14.2 G/DL (ref 11.7–15.7)
MAGNESIUM SERPL-MCNC: 1.9 MG/DL (ref 1.6–2.3)
MCH RBC QN AUTO: 29.7 PG (ref 26.5–33)
MCHC RBC AUTO-ENTMCNC: 33.3 G/DL (ref 31.5–36.5)
MCV RBC AUTO: 89 FL (ref 78–100)
PLATELET # BLD AUTO: 207 10E3/UL (ref 150–450)
POTASSIUM BLD-SCNC: 3.9 MMOL/L (ref 3.4–5.3)
RBC # BLD AUTO: 4.78 10E6/UL (ref 3.8–5.2)
SODIUM SERPL-SCNC: 138 MMOL/L (ref 133–144)
TSH SERPL DL<=0.005 MIU/L-ACNC: 0.96 MU/L (ref 0.4–4)
WBC # BLD AUTO: 7.1 10E3/UL (ref 4–11)

## 2022-03-18 PROCEDURE — 36415 COLL VENOUS BLD VENIPUNCTURE: CPT | Performed by: INTERNAL MEDICINE

## 2022-03-18 PROCEDURE — 80048 BASIC METABOLIC PNL TOTAL CA: CPT | Performed by: INTERNAL MEDICINE

## 2022-03-18 PROCEDURE — 87491 CHLMYD TRACH DNA AMP PROBE: CPT | Performed by: OBSTETRICS & GYNECOLOGY

## 2022-03-18 PROCEDURE — 84443 ASSAY THYROID STIM HORMONE: CPT | Performed by: INTERNAL MEDICINE

## 2022-03-18 PROCEDURE — 87624 HPV HI-RISK TYP POOLED RSLT: CPT | Performed by: OBSTETRICS & GYNECOLOGY

## 2022-03-18 PROCEDURE — G0145 SCR C/V CYTO,THINLAYER,RESCR: HCPCS | Performed by: OBSTETRICS & GYNECOLOGY

## 2022-03-18 PROCEDURE — 83735 ASSAY OF MAGNESIUM: CPT | Performed by: INTERNAL MEDICINE

## 2022-03-18 PROCEDURE — 93000 ELECTROCARDIOGRAM COMPLETE: CPT | Performed by: INTERNAL MEDICINE

## 2022-03-18 PROCEDURE — 87591 N.GONORRHOEAE DNA AMP PROB: CPT | Performed by: OBSTETRICS & GYNECOLOGY

## 2022-03-18 PROCEDURE — 99214 OFFICE O/P EST MOD 30 MIN: CPT | Performed by: INTERNAL MEDICINE

## 2022-03-18 PROCEDURE — 58301 REMOVE INTRAUTERINE DEVICE: CPT | Performed by: OBSTETRICS & GYNECOLOGY

## 2022-03-18 PROCEDURE — 58300 INSERT INTRAUTERINE DEVICE: CPT | Performed by: OBSTETRICS & GYNECOLOGY

## 2022-03-18 PROCEDURE — 85027 COMPLETE CBC AUTOMATED: CPT | Performed by: INTERNAL MEDICINE

## 2022-03-18 RX ORDER — HYDROXYZINE HYDROCHLORIDE 10 MG/1
TABLET, FILM COATED ORAL
COMMUNITY
Start: 2022-03-09

## 2022-03-18 RX ORDER — FLUOXETINE 10 MG/1
CAPSULE ORAL
COMMUNITY
Start: 2022-03-09 | End: 2022-05-05

## 2022-03-18 ASSESSMENT — PATIENT HEALTH QUESTIONNAIRE - PHQ9
SUM OF ALL RESPONSES TO PHQ QUESTIONS 1-9: 15
SUM OF ALL RESPONSES TO PHQ QUESTIONS 1-9: 15

## 2022-03-18 ASSESSMENT — PAIN SCALES - GENERAL: PAINLEVEL: MODERATE PAIN (4)

## 2022-03-18 NOTE — PATIENT INSTRUCTIONS
"1. The abnormal heart rhythm is called PVCs - premature ventricular contractions.  2. In a young healthy person, this is almost always benign   3. Blood work today  4. Get a 3 day monitor for full heart rhythm eval  5. Follow-up TBD based on heart monitor      Patient Education     Understanding Premature Ventricular Contractions (PVCs)  Premature ventricular contractions (PVCs) are a type of abnormal heartbeat (arrhythmia). They are commonly found in people of all ages.     How PVCs happen    Your heart has 4 chambers: 2 upper atria and 2 lower ventricles. Normally, a special group of \"pacemaker\" cells begins the signal to start your heartbeat. These cells are in the sinoatrial (SA) node in the right atrium. The signal quickly travels down your heart s conducting system. It moves to the left and right ventricle. As it travels, the signal triggers nearby parts of your heart to contract. This allows your heart to squeeze in a coordinated way.   During a PVC, the signal to start your heartbeat instead comes from one of the ventricles. This signal is premature, meaning it happens before the SA node has had a chance to fire. The signal spreads through the rest of your heart, causing a heartbeat. If this happens very soon after the previous heartbeat, your heart will push out very little blood. This causes a feeling of a pause between beats. If it happens a little later, your heart pushes out an almost normal amount of blood. This leads to a feeling of an extra heartbeat. So the heart has a  premature  heartbeat in between normal heartbeats.   What causes PVCs?  Certain things can help set off a premature signal in the ventricles. These include:    Advancing age    Reduced blood flow to your heart (such as coronary artery disease)    Scarring after a heart attack    Electrolyte problems, such as low sodium or potassium levels    Caffeine    Alcohol    Nicotine    Illegal drugs, such as cocaine and " "methamphetamine    Increased adrenaline, such as with anxiety    Certain medicines such as digoxin    Endocrine (hormone) problems, such as hyperthyroid (too much thyroid hormone)  Many heart conditions raise the risk for PVCs. These include:    High blood pressure    Heart attack    Coronary heart disease    Dilated cardiomyopathy    Hypertrophic cardiomyopathy    Congenital heart disease    Heart failure  They often happen in people without any heart disease. But PVCs are somewhat more common in people with some kind of heart disease.    What are the symptoms of PVCs?  Most people with occasional PVCs don t have symptoms. The more PVCs you have, the more likely you are to feel them. When symptoms do happen, they are usually minor. Symptoms may include:     An awareness of the heart beating    A fluttering or flip-flop feeling in your chest    Feeling of a \"skipped\" or \"extra\" heartbeat    Dizziness and near-fainting    A pulsing sensation in the neck  PVCs may cause more severe symptoms if you have another heart problem, such as heart failure.    How are PVCs diagnosed?  Your healthcare provider will ask about your medical history and give you a physical exam. An electrocardiogram (ECG) is the main test for diagnosis. This test records the electrical activity of your heart. During an ECG, small pads (electrodes) are placed on your chest, arms, and legs. Wires connect the pads to a machine, which records your heart s electrical signals. This test allows your provider to look at the signal of your heartbeat for a brief time. Any PVCs that occur during this time will show up on the ECG. In some cases, your healthcare provider might advise ECG monitoring over a day or more, up to several years. This can help to diagnose PVCs that don t happen often. . There are several types of heart monitors:     Holter monitor. This monitor is a small box with wires connected to pads on your chest. You wear it for 1 to 2 days. It " provides a constant recording of heart activity. After the test is done, your healthcare provider analyzes the recording.    Event monitors. These monitors are also small boxes with wires connected to pads on your chest. They are generally worn for 3 to 4 weeks. But they can be used for several months. One kind is a memory loop recorder. This monitor records constantly. But it stores the recording only when you press a button. The other kind is a credit card-sized recorder. This monitor is turned on only during an episode. With both types, you send the recordings of symptoms to your healthcare provider over the phone.    Mobile cardiac outpatient telemetry (MCOT).  This type of event monitor is usually used for 30 days. It uses cell phone technology to send data in real time to a monitoring center where trained technicians can review it. Or it can contact a healthcare provider for a life-threatening problem.    Patch monitor. This is a small self-contained adhesive patch that can record your heart rhythm for up to 2 weeks.    Insertable (or implantable) cardiac monitor (ICM).  This small device is implanted under the skin. It can be used to monitor the heart rhythm for several years.    Commercially available wearable heart rhythm monitors . These include smartwatches or wristbands. They may be useful for detecting abnormal heart rhythms.  These may be the only tests your healthcare provider will need. You may need more testing if you have PVCs often, or many in a row. Your provider may look at other causes, including possible heart problems. These tests might include:     Echocardiography. This test uses ultrasound to evaluate your heart s structure and function.    Cardiac stress testing. This test checks how your heart responds to exercise and to evaluate heart artery blood flow.    Cardiac CT or MRI. These imaging tests make detailed pictures of the heart.    Blood tests. This is done to check electrolyte and  thyroid levels.  [employer] last reviewed this educational content on 7/1/2020 2000-2021 [employer]. All rights reserved. This information is not intended as a substitute for professional medical care. Always follow your healthcare professional's instructions.

## 2022-03-18 NOTE — PROGRESS NOTES
SUBJECTIVE:    Is a pregnancy test required: No.  Was a consent obtained?  Yes    Subjective: Sandra Steve is a 36 year old  presents for IUD and desires Mirena type IUD.  She requests removal of the IUD because she feels like this one is displaced and would prefer to have it replaced    Patient has been given the opportunity to ask questions about all forms of birth control, including all options appropriate for Sandra Steve. Discussed that no method of birth control, except abstinence is 100% effective against pregnancy or sexually transmitted infection.     Sandra Steve understands she may have the IUD removed at any time. IUD should be removed by a health care provider and the current IUD will be removed today.    The entire removal and insertion procedure was reviewed with the patient, including care after placement.    Today's PHQ-2 Score:   PHQ-2 (  [employer]) 3/18/2022   Q1: Little interest or pleasure in doing things 1   Q2: Feeling down, depressed or hopeless 2   PHQ-2 Score 3   PHQ-2 Total Score (12-17 Years)- Positive if 3 or more points; Administer PHQ-A if positive -   Q1: Little interest or pleasure in doing things Several days   Q2: Feeling down, depressed or hopeless More than half the days   PHQ-2 Score 3       PROCEDURE:    A speculum exam was performed and the cervix was visualized. The IUD string was visualized. Using ring forceps, the string  was grasped and the IUD removed intact.    Under sterile technique, cervix was visualized with speculum and prepped with Betadine solution swab x 3.   The uterus sounded to 7.5 cm. IUD prepared for placement, and IUD inserted according to 's instructions without difficulty or significant ressitance, and deployed at the fundus. The strings were visualized and trimmed to 2.5 cm from the external os. Tenaculum was removed and hemostasis noted. Speculum removed.  Patient tolerated procedure well.    LOT# [id number]  Exp: 2024  NDC#  79160-760-77  EBL: minimal    Complications: none      POST PROCEDURE:    Given 's handouts, including when to have IUD removed, list of danger s/sx, side effects and follow up recommended. Encouraged condom use for prevention of STD. Advised to call for any fever, for prolonged or severe pain or bleeding, abnormal vaginal dischage, or unable to palpate strings. She was advised to use pain medications (ibuprofen) as needed for mild to moderate pain. Advised to follow-up in clinic in 4-6 weeks for IUD string check if unable to find strings or as directed by provider.     Chika Phillips MD    During office visit, her pulse and BP were hard to obtain with the machine.  Pulse rate was in the 40's when I checked, and her heart rate was irregular on ascultation.  Patient was brought to see Dr. Hammonds after her office visit here to work up her irregular pulse.     Chika Phillips M.D.

## 2022-03-18 NOTE — NURSING NOTE
"Initial BP 90/70 (BP Location: Right arm, Patient Position: Chair, Cuff Size: Adult Regular)   Pulse (!) 48   Temp 97  F (36.1  C) (Tympanic)   Resp 16   Wt 68 kg (150 lb)   Breastfeeding No   BMI 26.57 kg/m   Estimated body mass index is 26.57 kg/m  as calculated from the following:    Height as of 9/16/19: 1.6 m (5' 3\").    Weight as of this encounter: 68 kg (150 lb). .    Shonna Khan, DADA    "

## 2022-03-18 NOTE — PROGRESS NOTES
"  Assessment & Plan     PVC's (premature ventricular contractions) - almost certainly benign in a young healthy person with no known heart condition and underlying anxiety.  The adrenaline related to anxiety is likely the culprit.  May consider propranolol after work-up is complete.  Get the Zio patch and blood work to rule out any other cause.  Patient extremely anxious about these new finding.  It is difficult to say if the anxiety is driving the palpitations, or the palpitations are driving the anxiety    Palpitations  - EKG 12-lead complete w/read - Clinics  - EKG 12-lead complete w/read - Clinics  - Basic metabolic panel  (Ca, Cl, CO2, Creat, Gluc, K, Na, BUN); Future  - TSH with free T4 reflex; Future  - CBC with platelets; Future  - Leadless EKG Monitor 3 to 7 Days; Future  - Magnesium; Future  - Basic metabolic panel  (Ca, Cl, CO2, Creat, Gluc, K, Na, BUN)  - TSH with free T4 reflex  - CBC with platelets  - Magnesium             BMI:   Estimated body mass index is 26.57 kg/m  as calculated from the following:    Height as of this encounter: 1.6 m (5' 3\").    Weight as of this encounter: 68 kg (150 lb).       Depression Screening Follow Up    PHQ 3/18/2022   PHQ-9 Total Score 15   Q9: Thoughts of better off dead/self-harm past 2 weeks Several days   F/U: Thoughts of suicide or self-harm No   F/U: Safety concerns No     Last PHQ-9 3/18/2022   1.  Little interest or pleasure in doing things 2   2.  Feeling down, depressed, or hopeless 2   3.  Trouble falling or staying asleep, or sleeping too much 2   4.  Feeling tired or having little energy 2   5.  Poor appetite or overeating 2   6.  Feeling bad about yourself 2   7.  Trouble concentrating 1   8.  Moving slowly or restless 1   Q9: Thoughts of better off dead/self-harm past 2 weeks 1   PHQ-9 Total Score 15   Difficulty at work, home, or with people -   In the past two weeks have you had thoughts of suicide or self harm? No   Do you have concerns about your " "personal safety or the safety of others? No               Follow Up    Follow Up Actions Taken  Referred patient back to mental health provider    Discussed the following ways the patient can remain in a safe environment:  be around others  Patient Instructions   1. The abnormal heart rhythm is called PVCs - premature ventricular contractions.  2. In a young healthy person, this is almost always benign   3. Blood work today  4. Get a 3 day monitor for full heart rhythm eval  5. Follow-up TBD based on heart monitor      Patient Education     Understanding Premature Ventricular Contractions (PVCs)  Premature ventricular contractions (PVCs) are a type of abnormal heartbeat (arrhythmia). They are commonly found in people of all ages.     How PVCs happen    Your heart has 4 chambers: 2 upper atria and 2 lower ventricles. Normally, a special group of \"pacemaker\" cells begins the signal to start your heartbeat. These cells are in the sinoatrial (SA) node in the right atrium. The signal quickly travels down your heart s conducting system. It moves to the left and right ventricle. As it travels, the signal triggers nearby parts of your heart to contract. This allows your heart to squeeze in a coordinated way.   During a PVC, the signal to start your heartbeat instead comes from one of the ventricles. This signal is premature, meaning it happens before the SA node has had a chance to fire. The signal spreads through the rest of your heart, causing a heartbeat. If this happens very soon after the previous heartbeat, your heart will push out very little blood. This causes a feeling of a pause between beats. If it happens a little later, your heart pushes out an almost normal amount of blood. This leads to a feeling of an extra heartbeat. So the heart has a  premature  heartbeat in between normal heartbeats.   What causes PVCs?  Certain things can help set off a premature signal in the ventricles. These include:    Advancing " "age    Reduced blood flow to your heart (such as coronary artery disease)    Scarring after a heart attack    Electrolyte problems, such as low sodium or potassium levels    Caffeine    Alcohol    Nicotine    Illegal drugs, such as cocaine and methamphetamine    Increased adrenaline, such as with anxiety    Certain medicines such as digoxin    Endocrine (hormone) problems, such as hyperthyroid (too much thyroid hormone)  Many heart conditions raise the risk for PVCs. These include:    High blood pressure    Heart attack    Coronary heart disease    Dilated cardiomyopathy    Hypertrophic cardiomyopathy    Congenital heart disease    Heart failure  They often happen in people without any heart disease. But PVCs are somewhat more common in people with some kind of heart disease.    What are the symptoms of PVCs?  Most people with occasional PVCs don t have symptoms. The more PVCs you have, the more likely you are to feel them. When symptoms do happen, they are usually minor. Symptoms may include:     An awareness of the heart beating    A fluttering or flip-flop feeling in your chest    Feeling of a \"skipped\" or \"extra\" heartbeat    Dizziness and near-fainting    A pulsing sensation in the neck  PVCs may cause more severe symptoms if you have another heart problem, such as heart failure.    How are PVCs diagnosed?  Your healthcare provider will ask about your medical history and give you a physical exam. An electrocardiogram (ECG) is the main test for diagnosis. This test records the electrical activity of your heart. During an ECG, small pads (electrodes) are placed on your chest, arms, and legs. Wires connect the pads to a machine, which records your heart s electrical signals. This test allows your provider to look at the signal of your heartbeat for a brief time. Any PVCs that occur during this time will show up on the ECG. In some cases, your healthcare provider might advise ECG monitoring over a day or more, up " to several years. This can help to diagnose PVCs that don t happen often. . There are several types of heart monitors:     Holter monitor. This monitor is a small box with wires connected to pads on your chest. You wear it for 1 to 2 days. It provides a constant recording of heart activity. After the test is done, your healthcare provider analyzes the recording.    Event monitors. These monitors are also small boxes with wires connected to pads on your chest. They are generally worn for 3 to 4 weeks. But they can be used for several months. One kind is a memory loop recorder. This monitor records constantly. But it stores the recording only when you press a button. The other kind is a credit card-sized recorder. This monitor is turned on only during an episode. With both types, you send the recordings of symptoms to your healthcare provider over the phone.    Mobile cardiac outpatient telemetry (MCOT).  This type of event monitor is usually used for 30 days. It uses cell phone technology to send data in real time to a monitoring center where trained technicians can review it. Or it can contact a healthcare provider for a life-threatening problem.    Patch monitor. This is a small self-contained adhesive patch that can record your heart rhythm for up to 2 weeks.    Insertable (or implantable) cardiac monitor (ICM).  This small device is implanted under the skin. It can be used to monitor the heart rhythm for several years.    Commercially available wearable heart rhythm monitors . These include smartwatches or wristbands. They may be useful for detecting abnormal heart rhythms.  These may be the only tests your healthcare provider will need. You may need more testing if you have PVCs often, or many in a row. Your provider may look at other causes, including possible heart problems. These tests might include:     Echocardiography. This test uses ultrasound to evaluate your heart s structure and function.    Cardiac  stress testing. This test checks how your heart responds to exercise and to evaluate heart artery blood flow.    Cardiac CT or MRI. These imaging tests make detailed pictures of the heart.    Blood tests. This is done to check electrolyte and thyroid levels.  [employer] last reviewed this educational content on 7/1/2020 2000-2021 [employer]. All rights reserved. This information is not intended as a substitute for professional medical care. Always follow your healthcare professional's instructions.               No follow-ups on file.    Christie Hammonds, [facility]    Alpa Flores is a 36 year old who presents for the following health issues  accompanied by her self.    History of Present Illness       Mental Health Follow-up:                    Today's PHQ-9         PHQ-9 Total Score: 15  PHQ-9 Q9 Thoughts of better off dead/self-harm past 2 weeks :   (P) Several days  Thoughts of suicide or self harm: (P) No  Self-harm Plan:     Self-harm Action:       Safety concerns for self or others: (P) No            Vascular Disease:  She presents for follow up of vascular disease.  She never takes nitroglycerin. She is not taking daily aspirin.    She eats 2-3 servings of fruits and vegetables daily.She consumes 1 sweetened beverage(s) daily.She exercises with enough effort to increase her heart rate 30 to 60 minutes per day.  She exercises with enough effort to increase her heart rate 6 days per week. She is missing 1 dose(s) of medications per week.       Chief Complaint   Patient presents with     low HR and BP     lightheaded got worse after mirena got put in        Low HR:  --has 1 herbal tea/day  --no tobacco, minimal EtOH use (1-4 x month), no herbs/supplements, drugs  --no personal/fam history of cardiac disease - paternal grandmother with history of a-fib  --walks on treadmill, elliptical 6 days week - no symptoms   --occ feels heart is irregular, but not often  "  --has underlying anxiety so has attributed the symptoms to that  --severe grief - death of son - so does have times where she wakes up at night - attributes to panic attacks.  Follows with therapy and psychiatry    Current Outpatient Medications   Medication Sig Dispense Refill     buPROPion (WELLBUTRIN XL) 150 MG 24 hr tablet Take 1 tablet (150 mg) by mouth every morning May increase to 2 tablets (300 mg) after 1-2 weeks 90 tablet 0     FLUoxetine (PROZAC) 10 MG capsule TAKE 1 CAPSULE BY MOUTH EVERY DAY       hydrOXYzine (ATARAX) 10 MG tablet TAKE 1 TO 2 TABLETS BY MOUTH 3 TIMES DAILY AS NEEDED       levonorgestrel (MIRENA) 20 MCG/24HR IUD 1 each (20 mcg) by Intrauterine route once       levonorgestrel (MIRENA) 20 MCG/24HR IUD 1 each (20 mcg) by Intrauterine route continuous               Review of Systems   Constitutional, HEENT, cardiovascular, pulmonary, gi and gu systems are negative, except as otherwise noted.      Objective    BP 90/70 (BP Location: Right arm, Patient Position: Sitting, Cuff Size: Adult Regular)   Pulse (!) 45   Temp 97  F (36.1  C) (Tympanic)   Resp 16   Ht 1.6 m (5' 3\")   Wt 68 kg (150 lb)   SpO2 99%   BMI 26.57 kg/m    Body mass index is 26.57 kg/m .  Physical Exam   GENERAL APPEARANCE: healthy, alert and no distress  RESP: lungs clear to auscultation - no rales, rhonchi or wheezes  CV: normal S1 S2, no S3 or S4, no murmur, click or rub and frequent extasystoles with compensatory pause  PSYCH: mentation appears normal, anxious, crying and worried    EKG - Reviewed and interpreted by me 1st degree AV block with PVC with compensatory pause            "

## 2022-03-19 LAB
C TRACH DNA SPEC QL PROBE+SIG AMP: NEGATIVE
N GONORRHOEA DNA SPEC QL NAA+PROBE: NEGATIVE

## 2022-03-19 ASSESSMENT — PATIENT HEALTH QUESTIONNAIRE - PHQ9: SUM OF ALL RESPONSES TO PHQ QUESTIONS 1-9: 15

## 2022-03-22 LAB
BKR LAB AP GYN ADEQUACY: NORMAL
BKR LAB AP GYN INTERPRETATION: NORMAL
BKR LAB AP HPV REFLEX: NORMAL
BKR LAB AP PREVIOUS ABNORMAL: NORMAL
PATH REPORT.COMMENTS IMP SPEC: NORMAL
PATH REPORT.COMMENTS IMP SPEC: NORMAL
PATH REPORT.RELEVANT HX SPEC: NORMAL

## 2022-03-23 ENCOUNTER — HOSPITAL ENCOUNTER (OUTPATIENT)
Dept: CARDIOLOGY | Facility: CLINIC | Age: 37
Discharge: HOME | End: 2022-03-23
Attending: INTERNAL MEDICINE | Admitting: INTERNAL MEDICINE
Payer: COMMERCIAL

## 2022-03-23 DIAGNOSIS — R00.2 PALPITATIONS: ICD-10-CM

## 2022-03-23 PROCEDURE — 93244 EXT ECG>48HR<7D REV&INTERPJ: CPT | Performed by: INTERNAL MEDICINE

## 2022-03-23 PROCEDURE — 93242 EXT ECG>48HR<7D RECORDING: CPT

## 2022-03-24 LAB
HUMAN PAPILLOMA VIRUS 16 DNA: NEGATIVE
HUMAN PAPILLOMA VIRUS 18 DNA: NEGATIVE
HUMAN PAPILLOMA VIRUS FINAL DIAGNOSIS: NORMAL
HUMAN PAPILLOMA VIRUS OTHER HR: NEGATIVE

## 2022-03-30 ENCOUNTER — OFFICE VISIT (OUTPATIENT)
Dept: FAMILY MEDICINE | Facility: CLINIC | Age: 37
End: 2022-03-30
Payer: COMMERCIAL

## 2022-03-30 VITALS
OXYGEN SATURATION: 99 % | SYSTOLIC BLOOD PRESSURE: 100 MMHG | TEMPERATURE: 97.8 F | DIASTOLIC BLOOD PRESSURE: 72 MMHG | WEIGHT: 148 LBS | BODY MASS INDEX: 26.22 KG/M2 | HEIGHT: 63 IN | HEART RATE: 85 BPM | RESPIRATION RATE: 16 BRPM

## 2022-03-30 DIAGNOSIS — L73.9 FOLLICULITIS: Primary | ICD-10-CM

## 2022-03-30 PROCEDURE — 99213 OFFICE O/P EST LOW 20 MIN: CPT | Performed by: FAMILY MEDICINE

## 2022-03-30 RX ORDER — CEPHALEXIN 500 MG/1
500 CAPSULE ORAL 3 TIMES DAILY
Qty: 21 CAPSULE | Refills: 0 | Status: SHIPPED | OUTPATIENT
Start: 2022-03-30 | End: 2022-04-06

## 2022-03-30 ASSESSMENT — ENCOUNTER SYMPTOMS
CARDIOVASCULAR NEGATIVE: 1
PSYCHIATRIC NEGATIVE: 1
MUSCULOSKELETAL NEGATIVE: 1
ADENOPATHY: 1
EYES NEGATIVE: 1
GASTROINTESTINAL NEGATIVE: 1
ALLERGIC/IMMUNOLOGIC NEGATIVE: 1
CONSTITUTIONAL NEGATIVE: 1
WOUND: 1
ENDOCRINE NEGATIVE: 1
NEUROLOGICAL NEGATIVE: 1
RESPIRATORY NEGATIVE: 1

## 2022-03-30 ASSESSMENT — PAIN SCALES - GENERAL: PAINLEVEL: SEVERE PAIN (7)

## 2022-03-30 NOTE — PATIENT INSTRUCTIONS
Thank you for choosing [facility].  You may be receiving an email and/or telephone survey request from [facility] [employer] regarding your visit today.  Please take a few minutes to respond to the survey to let us know how we are doing.      If you have questions or concerns, please contact us via [employer] or you can contact your care team at [phone] option 2.    Our Clinic hours are:  Monday - Thursday 7am-6pm  Friday 7am-5pm    The [place] outpatient lab hours are:  Monday - Friday 7am-4:30pm    Appointments are required, call [phone]    If you have clinical questions after hours or would like to schedule an appointment,  call the clinic at [phone].    Patient Education     Folliculitis  Folliculitis is an infection of a hair follicle. A hair follicle is the little pocket where a hair grows out of the skin. Bacteria normally live on the skin. But sometimes bacteria can get trapped in a follicle and cause infection. This causes a bumpy rash. The area over the follicles is red and raised. It may itch or be painful. The bumps may have fluid (pus) inside. The pus may leak and then form crusts. Sores can spread to other areas of the body. Once it goes away, folliculitis can come back at any time. Severe cases may cause lasting (permanent) hair loss and scarring.   Folliculitis can happen anywhere on the body where hair grows. It can be caused by rubbing from tight clothing. Ingrown hairs can cause it. Soaking in a hot tub or swimming pool that has bacteria in the water can cause it. It may also occur if a hair follicle is blocked by a bandage.   Sores often go away in a few days with no treatment. In some cases, medicine may be given. A small piece of skin or pus may be taken to find the type of bacteria causing the infection.   Home care  The healthcare provider may prescribe an antibiotic cream or ointment. Antibiotics taken by mouth (oral) may also be prescribed. Or you may be  told to use an over-the-counter antibiotic cream. Follow all instructions when using any of these medicines.   General care    Apply warm, moist compresses to the sores for 20 minutes up to 3 times a day. You can make a compress by soaking a cloth in warm water. Squeeze out excess water.    Don t cut, poke, or squeeze the sores. This can be painful and spread infection.    Don t scratch the affected area. Scratching can delay healing.    Don t shave the areas affected by folliculitis.    If the sores leak fluid, cover the area with a nonstick gauze bandage. Use as little tape as possible. Carefully get rid of all soiled bandages.    Dress in loose cotton clothing.    Change sheets and blankets if they are soiled by pus. Wash all clothes, towels, sheets, and cloth diapers in soap and hot water. Don't share clothes, towels, or sheets with other family members.    Don't soak the sores in bath water. This can spread infection. Instead keep the area clean by gently washing sores with soap and warm water.    Wash your hands or use antibacterial gels often to prevent spreading the bacteria.    Follow-up care  Follow up with your healthcare provider, or as advised.  When to get medical advice  Call your healthcare provider right away if any of these occur:    Fever of 100.4 F (38 C) or higher, or as directed by your provider    Rash spreads    Rash does not get better with treatment    Redness or swelling that gets worse    Rash becomes more painful    Foul-smelling fluid leaking from the skin    Rash improves, but then comes back   [employer] last reviewed this educational content on 8/1/2019 2000-2021 [employer]. All rights reserved. This information is not intended as a substitute for professional medical care. Always follow your healthcare professional's instructions.

## 2022-03-30 NOTE — PROGRESS NOTES
"  Assessment & Plan     Folliculitis  Antibiotics faxed. Recommend sitz baths. Asked to call if symptoms do not improve.   - cephALEXin (KEFLEX) 500 MG capsule; Take 1 capsule (500 mg) by mouth 3 times daily for 7 days        FUTURE APPOINTMENTS:       - Follow-up visit in one month or sooner as needed.    Return in about 4 weeks (around 4/27/2022) for Follow up.    Aristides Masters MD  [facility] is [facility] 36 year old who presents for the following health issues  accompanied by her self.    HPI 37-year-old female here for ingrowing hair infection.  Was seen about 2 weeks ago for same and was told then that she had an ingrowing hair in her pubic area.  Since then she says the pain is worsened.  She is having drainage.  She reports no fever or chills.  She feels like her lymph nodes a bit enlarged.  No other systemic symptoms.    Chief Complaint   Patient presents with     Ingrown hair     Ingrown hair in the pubic area.  This started one month ago.  Feels this may be infected the last 1 1/2 weeks.  There is now drainage and pain.  No fever noted.  Has been applying an antibiotic ointment as needed.  She was seen at the OB-GYN clinc on 3-18-22.   Was told she had an ingrown hair.           Review of Systems   Constitutional: Negative.    HENT: Negative.    Eyes: Negative.    Respiratory: Negative.    Cardiovascular: Negative.    Gastrointestinal: Negative.    Endocrine: Negative.    Breasts:  negative.    Genitourinary: Negative.    Musculoskeletal: Negative.    Skin: Positive for wound.   Allergic/Immunologic: Negative.    Neurological: Negative.    Hematological: Positive for adenopathy.   Psychiatric/Behavioral: Negative.              Objective    /72   Pulse 85   Temp 97.8  F (36.6  C) (Tympanic)   Resp 16   Ht 1.6 m (5' 3\")   Wt 67.1 kg (148 lb)   SpO2 99%   BMI 26.22 kg/m    Body mass index is 26.22 kg/m .  Physical Exam  Constitutional:       " Appearance: Normal appearance. She is normal weight.   HENT:      Head: Normocephalic and atraumatic.   Genitourinary:         Comments: Erythema on pubic area tender to palpation  Musculoskeletal:         General: Normal range of motion.   Lymphadenopathy:      Lower Body: Right inguinal adenopathy present. Left inguinal adenopathy present.   Skin:     General: Skin is dry.      Findings: Erythema present.   Neurological:      Mental Status: She is alert and oriented to person, place, and time.   Psychiatric:         Mood and Affect: Mood normal.         Behavior: Behavior normal.

## 2022-04-04 NOTE — RESULT ENCOUNTER NOTE
The heart monitor shows no serious heart rhythm problems.  There are frequent irregular/extra beats (PVC), similar to what we saw on the EKG in the clinic.  They are frequent - about 30% of the time.  This can cause you symptoms of racing heart, feeling of skipped beats, palpitations.  I will call you tomorrow and we can talk in more detail when I am back in the office.

## 2022-04-05 DIAGNOSIS — I49.3 PVC'S (PREMATURE VENTRICULAR CONTRACTIONS): Primary | ICD-10-CM

## 2022-04-05 RX ORDER — PROPRANOLOL HYDROCHLORIDE 10 MG/1
10 TABLET ORAL 2 TIMES DAILY
Qty: 60 TABLET | Refills: 0 | Status: SHIPPED | OUTPATIENT
Start: 2022-04-05

## 2022-04-05 NOTE — RESULT ENCOUNTER NOTE
Discussed with patient.  Discussed starting propranolol 10 mg twice daily; pulse should be between .      You need to see Cardiology -Please call [phone] to schedule.

## 2022-05-05 ENCOUNTER — OFFICE VISIT (OUTPATIENT)
Dept: CARDIOLOGY | Facility: CLINIC | Age: 37
End: 2022-05-05
Attending: INTERNAL MEDICINE
Payer: COMMERCIAL

## 2022-05-05 VITALS
SYSTOLIC BLOOD PRESSURE: 118 MMHG | HEART RATE: 50 BPM | BODY MASS INDEX: 25.76 KG/M2 | OXYGEN SATURATION: 99 % | HEIGHT: 63 IN | WEIGHT: 145.4 LBS | RESPIRATION RATE: 14 BRPM | DIASTOLIC BLOOD PRESSURE: 60 MMHG

## 2022-05-05 DIAGNOSIS — I49.3 PVC'S (PREMATURE VENTRICULAR CONTRACTIONS): ICD-10-CM

## 2022-05-05 DIAGNOSIS — R00.2 PALPITATIONS: Primary | ICD-10-CM

## 2022-05-05 PROCEDURE — 93000 ELECTROCARDIOGRAM COMPLETE: CPT | Performed by: INTERNAL MEDICINE

## 2022-05-05 PROCEDURE — 99204 OFFICE O/P NEW MOD 45 MIN: CPT | Performed by: INTERNAL MEDICINE

## 2022-05-05 RX ORDER — BUPROPION HYDROCHLORIDE 300 MG/1
TABLET ORAL
COMMUNITY

## 2022-05-05 RX ORDER — FLUCONAZOLE 150 MG/1
TABLET ORAL
COMMUNITY

## 2022-05-05 NOTE — LETTER
"5/5/2022    Chika Phillips MD  [address] [facility] [address]  [facility] [address]    RE: Sandra Steve       Dear Colleague,     I had the pleasure of seeing Sandra Steve in the [facility].         [facility]   [address], [place] [address]   [email or web address]   OFFICE: [phone]          Thank you Dr. Hammonds for asking the [facility] team to participate in the care of your patient, Sandra Steve.     Impression and Plan     1.  PVCs/palpitations.  Sandra had been reporting some palpitations and noted to have evidence of frequent PVCs on Holter monitor.  She was started on propranolol and does feel as though her palpitations have considerably lessened though still has some occasional subjective \"skipped beats\".  She otherwise is without significant complaint.  I do feel given the number of PVCs it would be reasonable to exclude any evidence of PVC induced depression and LV function.  Plan:    Echocardiogram.    Further recommendations and follow-up pending echocardiographic findings    30 minutes spent reviewing prior records (including documentation, laboratory studies, cardiac testing/imaging), interview with patient along with physical exam, planning, and subsequent documentation/crafting of note.       History of Present Illness    Once again I would like to thank you again for asking me to participate in the care of your patient, Sandra Steve.  As you know, but to reiterate for my own records, Sandra Steve is a 36 year old female with palpitations.  Sandra states that she had noticed some intermittent subjective skipped beats.  As part of her evaluation she had a Holter monitor that revealed frequent PVCs.  She herself wonders if anxiety is a trigger for her palpitations.  She other than the palpitations is otherwise without prominent symptoms.  She denies significant lightheadedness.  No chest pain, shortness of breath, diminished " "exercise tolerance.  No fevers, chills, or other constitutional symptoms.    Further review of systems is otherwise negative/noncontributory (medical record and 13 point review of systems reviewed as well and pertinent positives noted).       Cardiac Diagnostics       2-day ambulatory monitor March 2022:  1. Average heart rate 82 bpm.    2. Underlying rhythm sinus.  3. Frequent PVCs accounting for 29% of beats with reported ventricular bigeminy and trigeminy.    Twelve-lead ECG (personally reviewed) 5 May 2022: Sinus rhythm with heart rate of 48 bpm.  Otherwise normal ECG.       Physical Examination       /60 (BP Location: Left arm, Patient Position: Sitting, Cuff Size: Adult Regular)   Pulse (!) 48   Resp 14   Ht 1.6 m (5' 3\")   Wt 66 kg (145 lb 6.4 oz)   SpO2 99%   BMI 25.76 kg/m          Wt Readings from Last 3 Encounters:   05/05/22 66 kg (145 lb 6.4 oz)   03/30/22 67.1 kg (148 lb)   03/18/22 68 kg (150 lb)     The patient is alert and oriented times three. Sclerae are anicteric. Mucosal membranes are moist. Jugular venous pressure is normal. No significant adenopathy/thyromegally appreciated. Lungs are clear with good expansion. On cardiovascular exam, the patient has a regular S1 and S2. Abdomen is soft and non-tender. Extremities reveal no clubbing, cyanosis, or edema.       Family History/Social History/Risk Factors   Patient does not smoke.  Family history reviewed, and family history includes Cerebrovascular Disease in her paternal grandmother; Depression in her brother and father; Diabetes in her paternal grandfather; Emphysema in her maternal grandfather; No Known Problems in her father and mother; Substance Abuse in her father.        Medical History  Surgical History Family History Social History   Past Medical History:   Diagnosis Date     Anxiety      Asthma      Chickenpox      Depression      PID (acute pelvic inflammatory disease)     related to IUD insertion     Past Surgical History: "   Procedure Laterality Date     C/SECTION, LOW TRANSVERSE  2015      SECTION N/A 2018    Procedure:  SECTION;  Repeat  Section ;  Surgeon: Urmila Cuevas MD;  Location:  L+D     OTHER SURGICAL HISTORY      none     Family History   Problem Relation Age of Onset     Substance Abuse Father         recovered alcohol     Depression Father      Depression Brother      Emphysema Maternal Grandfather      Cerebrovascular Disease Paternal Grandmother      Diabetes Paternal Grandfather      No Known Problems Mother      No Known Problems Father         Social History     Socioeconomic History     Marital status:      Spouse name: Not on file     Number of children: Not on file     Years of education: Not on file     Highest education level: Not on file   Occupational History     Not on file   Tobacco Use     Smoking status: Never Smoker     Smokeless tobacco: Never Used   Vaping Use     Vaping Use: Never used   Substance and Sexual Activity     Alcohol use: Yes     Alcohol/week: 1.0 standard drink     Types: 1 Glasses of wine per week     Comment: Social use.     Drug use: No     Sexual activity: Yes   Other Topics Concern     Parent/sibling w/ CABG, MI or angioplasty before 65F 55M? Not Asked   Social History Narrative     Not on file     Social Determinants of Health     Financial Resource Strain: Not on file   Food Insecurity: Not on file   Transportation Needs: Not on file   Physical Activity: Not on file   Stress: Not on file   Social Connections: Not on file   Intimate Partner Violence: Not on file   Housing Stability: Not on file           Medications  Allergies   Current Outpatient Medications   Medication Sig Dispense Refill     buPROPion (WELLBUTRIN XL) 300 MG 24 hr tablet bupropion HCl  mg 24 hr tablet, extended release       fluconazole (DIFLUCAN) 150 MG tablet fluconazole 150 mg tablet       FLUoxetine (PROZAC) 20 MG capsule every 24 hours       hydrOXYzine  (ATARAX) 10 MG tablet TAKE 1 TO 2 TABLETS BY MOUTH 3 TIMES DAILY AS NEEDED       levonorgestrel (MIRENA) 20 MCG/24HR IUD 1 each (20 mcg) by Intrauterine route once       propranolol (INDERAL) 10 MG tablet Take 1 tablet (10 mg) by mouth 2 times daily 60 tablet 0     No Known Allergies       Lab Results    Chemistry/lipid CBC Cardiac Enzymes/BNP/TSH/INR   No results for input(s): CHOL, HDL, LDL, TRIG, CHOLHDLRATIO in the last 71396 hours.  No results for input(s): LDL in the last 30495 hours.  Recent Labs   Lab Test 03/18/22  1630      POTASSIUM 3.9   CHLORIDE 104   CO2 29   GLC 81   BUN 14   CR 0.90   GFRESTIMATED 86   LAW 9.7     Recent Labs   Lab Test 03/18/22  1630   CR 0.90     No results for input(s): A1C in the last 01019 hours.       Recent Labs   Lab Test 03/18/22  1631   WBC 7.1   HGB 14.2   HCT 42.7   MCV 89        Recent Labs   Lab Test 03/18/22  1631 01/13/18  1646 01/09/18  1225   HGB 14.2 11.2* 13.0    No results for input(s): TROPONINI in the last 49974 hours.  No results for input(s): BNP, NTBNPI, NTBNP in the last 39603 hours.  Recent Labs   Lab Test 03/18/22  1630   TSH 0.96     No results for input(s): INR in the last 89071 hours.       Medications  Allergies   Current Outpatient Medications   Medication Sig Dispense Refill     buPROPion (WELLBUTRIN XL) 300 MG 24 hr tablet bupropion HCl  mg 24 hr tablet, extended release       fluconazole (DIFLUCAN) 150 MG tablet fluconazole 150 mg tablet       FLUoxetine (PROZAC) 20 MG capsule every 24 hours       hydrOXYzine (ATARAX) 10 MG tablet TAKE 1 TO 2 TABLETS BY MOUTH 3 TIMES DAILY AS NEEDED       levonorgestrel (MIRENA) 20 MCG/24HR IUD 1 each (20 mcg) by Intrauterine route once       propranolol (INDERAL) 10 MG tablet Take 1 tablet (10 mg) by mouth 2 times daily 60 tablet 0      No Known Allergies     Lab Results   Lab Results   Component Value Date     03/18/2022    CO2 29 03/18/2022    BUN 14 03/18/2022     Lab Results    Component Value Date    WBC 7.1 2022    WBC 10.3 2018    HGB 14.2 2022    HGB 11.2 2018    HCT 42.7 2022    HCT 38.8 2018    MCV 89 2022    MCV 89 2018     2022     2018     Lab Results   Component Value Date    TSH 0.96 2022         Medical History  Surgical History   Past Medical History:   Diagnosis Date     Anxiety      Asthma      Chickenpox      Depression      PID (acute pelvic inflammatory disease)     related to IUD insertion      Past Surgical History:   Procedure Laterality Date     C/SECTION, LOW TRANSVERSE  2015      SECTION N/A 2018    Procedure:  SECTION;  Repeat  Section ;  Surgeon: Urmila Cuevas MD;  Location:  L+D     OTHER SURGICAL HISTORY      none               Thank you for allowing me to participate in the care of your patient.      Sincerely,     Maritn Carrillo MD     [facility]  cc:   Christie Hammonds, DO  [address] [place] [address]

## 2022-05-05 NOTE — PROGRESS NOTES
"       [facility] HE[facility]   [address], [place] [address]   [email or web address]   OFFICE: [phone]          Thank you Dr. Hammonds for asking the [facility] team to participate in the care of your patient, Sandra Steve.     Impression and Plan     1.  PVCs/palpitations.  Sandra had been reporting some palpitations and noted to have evidence of frequent PVCs on Holter monitor.  She was started on propranolol and does feel as though her palpitations have considerably lessened though still has some occasional subjective \"skipped beats\".  She otherwise is without significant complaint.  I do feel given the number of PVCs it would be reasonable to exclude any evidence of PVC induced depression and LV function.  Plan:    Echocardiogram.    Further recommendations and follow-up pending echocardiographic findings    30 minutes spent reviewing prior records (including documentation, laboratory studies, cardiac testing/imaging), interview with patient along with physical exam, planning, and subsequent documentation/crafting of note.       History of Present Illness    Once again I would like to thank you again for asking me to participate in the care of your patient, Sandra Steve.  As you know, but to reiterate for my own records, Sandra Steev is a 36 year old female with palpitations.  Sandra states that she had noticed some intermittent subjective skipped beats.  As part of her evaluation she had a Holter monitor that revealed frequent PVCs.  She herself wonders if anxiety is a trigger for her palpitations.  She other than the palpitations is otherwise without prominent symptoms.  She denies significant lightheadedness.  No chest pain, shortness of breath, diminished exercise tolerance.  No fevers, chills, or other constitutional symptoms.    Further review of systems is otherwise negative/noncontributory (medical record and 13 point review of systems reviewed as " "well and pertinent positives noted).       Cardiac Diagnostics       2-day ambulatory monitor 2022:  1. Average heart rate 82 bpm.    2. Underlying rhythm sinus.  3. Frequent PVCs accounting for 29% of beats with reported ventricular bigeminy and trigeminy.    Twelve-lead ECG (personally reviewed) 5 May 2022: Sinus rhythm with heart rate of 48 bpm.  Otherwise normal ECG.       Physical Examination       /60 (BP Location: Left arm, Patient Position: Sitting, Cuff Size: Adult Regular)   Pulse (!) 48   Resp 14   Ht 1.6 m (5' 3\")   Wt 66 kg (145 lb 6.4 oz)   SpO2 99%   BMI 25.76 kg/m          Wt Readings from Last 3 Encounters:   22 66 kg (145 lb 6.4 oz)   22 67.1 kg (148 lb)   22 68 kg (150 lb)     The patient is alert and oriented times three. Sclerae are anicteric. Mucosal membranes are moist. Jugular venous pressure is normal. No significant adenopathy/thyromegally appreciated. Lungs are clear with good expansion. On cardiovascular exam, the patient has a regular S1 and S2. Abdomen is soft and non-tender. Extremities reveal no clubbing, cyanosis, or edema.       Family History/Social History/Risk Factors   Patient does not smoke.  Family history reviewed, and family history includes Cerebrovascular Disease in her paternal grandmother; Depression in her brother and father; Diabetes in her paternal grandfather; Emphysema in her maternal grandfather; No Known Problems in her father and mother; Substance Abuse in her father.        Medical History  Surgical History Family History Social History   Past Medical History:   Diagnosis Date     Anxiety      Asthma      Chickenpox      Depression      PID (acute pelvic inflammatory disease)     related to IUD insertion     Past Surgical History:   Procedure Laterality Date     C/SECTION, LOW TRANSVERSE  2015      SECTION N/A 2018    Procedure:  SECTION;  Repeat  Section ;  Surgeon: Urmila Cuevas MD;  " Location: [facility] L+D     OTHER SURGICAL HISTORY      none     Family History   Problem Relation Age of Onset     Substance Abuse Father         recovered alcohol     Depression Father      Depression Brother      Emphysema Maternal Grandfather      Cerebrovascular Disease Paternal Grandmother      Diabetes Paternal Grandfather      No Known Problems Mother      No Known Problems Father         Social History     Socioeconomic History     Marital status:      Spouse name: Not on file     Number of children: Not on file     Years of education: Not on file     Highest education level: Not on file   Occupational History     Not on file   Tobacco Use     Smoking status: Never Smoker     Smokeless tobacco: Never Used   Vaping Use     Vaping Use: Never used   Substance and Sexual Activity     Alcohol use: Yes     Alcohol/week: 1.0 standard drink     Types: 1 Glasses of wine per week     Comment: Social use.     Drug use: No     Sexual activity: Yes   Other Topics Concern     Parent/sibling w/ CABG, MI or angioplasty before 65F 55M? Not Asked   Social History Narrative     Not on file     Social Determinants of Health     Financial Resource Strain: Not on file   Food Insecurity: Not on file   Transportation Needs: Not on file   Physical Activity: Not on file   Stress: Not on file   Social Connections: Not on file   Intimate Partner Violence: Not on file   Housing Stability: Not on file           Medications  Allergies   Current Outpatient Medications   Medication Sig Dispense Refill     buPROPion (WELLBUTRIN XL) 300 MG 24 hr tablet bupropion HCl  mg 24 hr tablet, extended release       fluconazole (DIFLUCAN) 150 MG tablet fluconazole 150 mg tablet       FLUoxetine (PROZAC) 20 MG capsule every 24 hours       hydrOXYzine (ATARAX) 10 MG tablet TAKE 1 TO 2 TABLETS BY MOUTH 3 TIMES DAILY AS NEEDED       levonorgestrel (MIRENA) 20 MCG/24HR IUD 1 each (20 mcg) by Intrauterine route once       propranolol (INDERAL) 10 MG  tablet Take 1 tablet (10 mg) by mouth 2 times daily 60 tablet 0     No Known Allergies       Lab Results    Chemistry/lipid CBC Cardiac Enzymes/BNP/TSH/INR   No results for input(s): CHOL, HDL, LDL, TRIG, CHOLHDLRATIO in the last 48754 hours.  No results for input(s): LDL in the last 84387 hours.  Recent Labs   Lab Test 03/18/22  1630      POTASSIUM 3.9   CHLORIDE 104   CO2 29   GLC 81   BUN 14   CR 0.90   GFRESTIMATED 86   LAW 9.7     Recent Labs   Lab Test 03/18/22  1630   CR 0.90     No results for input(s): A1C in the last 47980 hours.       Recent Labs   Lab Test 03/18/22  1631   WBC 7.1   HGB 14.2   HCT 42.7   MCV 89        Recent Labs   Lab Test 03/18/22  1631 01/13/18  1646 01/09/18  1225   HGB 14.2 11.2* 13.0    No results for input(s): TROPONINI in the last 12950 hours.  No results for input(s): BNP, NTBNPI, NTBNP in the last 40598 hours.  Recent Labs   Lab Test 03/18/22  1630   TSH 0.96     No results for input(s): INR in the last 11850 hours.       Medications  Allergies   Current Outpatient Medications   Medication Sig Dispense Refill     buPROPion (WELLBUTRIN XL) 300 MG 24 hr tablet bupropion HCl  mg 24 hr tablet, extended release       fluconazole (DIFLUCAN) 150 MG tablet fluconazole 150 mg tablet       FLUoxetine (PROZAC) 20 MG capsule every 24 hours       hydrOXYzine (ATARAX) 10 MG tablet TAKE 1 TO 2 TABLETS BY MOUTH 3 TIMES DAILY AS NEEDED       levonorgestrel (MIRENA) 20 MCG/24HR IUD 1 each (20 mcg) by Intrauterine route once       propranolol (INDERAL) 10 MG tablet Take 1 tablet (10 mg) by mouth 2 times daily 60 tablet 0      No Known Allergies     Lab Results   Lab Results   Component Value Date     03/18/2022    CO2 29 03/18/2022    BUN 14 03/18/2022     Lab Results   Component Value Date    WBC 7.1 03/18/2022    WBC 10.3 01/09/2018    HGB 14.2 03/18/2022    HGB 11.2 01/13/2018    HCT 42.7 03/18/2022    HCT 38.8 01/09/2018    MCV 89 03/18/2022    MCV 89 01/09/2018      2022     2018     Lab Results   Component Value Date    TSH 0.96 2022         Medical History  Surgical History   Past Medical History:   Diagnosis Date     Anxiety      Asthma      Chickenpox      Depression      PID (acute pelvic inflammatory disease)     related to IUD insertion      Past Surgical History:   Procedure Laterality Date     C/SECTION, LOW TRANSVERSE  2015      SECTION N/A 2018    Procedure:  SECTION;  Repeat  Section ;  Surgeon: Urmila Cuevas MD;  Location:  L+D     OTHER SURGICAL HISTORY      none

## 2022-05-06 LAB
ATRIAL RATE - MUSE: 48 BPM
DIASTOLIC BLOOD PRESSURE - MUSE: NORMAL MMHG
INTERPRETATION ECG - MUSE: NORMAL
P AXIS - MUSE: 59 DEGREES
PR INTERVAL - MUSE: 112 MS
QRS DURATION - MUSE: 82 MS
QT - MUSE: 444 MS
QTC - MUSE: 396 MS
R AXIS - MUSE: 7 DEGREES
SYSTOLIC BLOOD PRESSURE - MUSE: NORMAL MMHG
T AXIS - MUSE: 64 DEGREES
VENTRICULAR RATE- MUSE: 48 BPM

## 2022-05-21 ENCOUNTER — HEALTH MAINTENANCE LETTER (OUTPATIENT)
Age: 37
End: 2022-05-21

## 2022-06-22 ENCOUNTER — E-VISIT (OUTPATIENT)
Dept: URGENT CARE | Facility: CLINIC | Age: 37
End: 2022-06-22
Payer: COMMERCIAL

## 2022-06-22 DIAGNOSIS — N89.8 VAGINAL DISCHARGE: Primary | ICD-10-CM

## 2022-06-22 PROCEDURE — 99422 OL DIG E/M SVC 11-20 MIN: CPT

## 2022-06-22 NOTE — PATIENT INSTRUCTIONS
Thank you for choosing us for your care. Given your symptoms, I would like you to do a lab-only visit to determine what is causing them.  I have placed the orders.  Please schedule an appointment with the lab right here in [facility], or call [phone].  I will let you know when the results are back and next steps to take.

## 2022-09-17 ENCOUNTER — HEALTH MAINTENANCE LETTER (OUTPATIENT)
Age: 37
End: 2022-09-17

## 2022-11-28 ENCOUNTER — TELEPHONE (OUTPATIENT)
Dept: FAMILY MEDICINE | Facility: CLINIC | Age: 37
End: 2022-11-28

## 2022-11-28 NOTE — TELEPHONE ENCOUNTER
Patient Quality Outreach    Patient is due for the following:   Depression  -  PHQ-9 needed    Next Steps:   complete questionnaires    Type of outreach:    Sent [employer] message.   Will postpone x 1 week, if not viewed or completed will call to review questionnaire.        Questions for provider review:    None     Macy Hampton, [facility]

## 2022-11-28 NOTE — LETTER
December 20, 2022      Sandra Steve  [address]  [place] [address]        Dear Sandra,     Your team at [facility] cares about your health. We have reviewed your chart and based on our findings; we are making the following recommendations to better manage your health.     You are in particular need of attention regarding the following:     An update on the depression and anxiety questionnaires.  These tools help your provider to monitor and manage your symptoms and treatment plan.  Please complete the attached questionnaires and send back to the clinic.    Thank you for choosing [facility] for your healthcare needs. For any questions,   concerns, or to schedule an appointment please contact our clinic.    Healthy Regards,      Your [facility] Care Team

## 2022-12-06 NOTE — TELEPHONE ENCOUNTER
Patient Quality Outreach    Patient is due for the following:   Depression  -  PHQ-9 needed    Next Steps:   complete questionnaires    Type of outreach:    [employer] message has not been viewed, message sent to return call to clinic or view the message on [employer].  Will postpone x 1 week, mail phq/shreyas if no response to phone call or mychart.      Questions for provider review:    None     Macy Hampton, CMA

## 2023-06-03 ENCOUNTER — HEALTH MAINTENANCE LETTER (OUTPATIENT)
Age: 38
End: 2023-06-03

## 2024-07-13 ENCOUNTER — HEALTH MAINTENANCE LETTER (OUTPATIENT)
Age: 39
End: 2024-07-13

## (undated) DEVICE — SOL WATER IRRIG 1000ML BOTTLE 2F7114

## (undated) DEVICE — PREP POVIDONE IODINE SOLUTION 10% 120ML

## (undated) DEVICE — SU VICRYL 0 CT-1 36" J346H

## (undated) DEVICE — SOL NACL 0.9% IRRIG 1000ML BOTTLE 2F7124

## (undated) DEVICE — SU VICRYL 2-0 CT-1 27" UND J259H

## (undated) DEVICE — BARRIER SEPRAFILM 5X6" SINGLE SHEET 4301-02

## (undated) DEVICE — GOWN XLG DISP 9545

## (undated) DEVICE — LINEN TOWEL PACK X5 5464

## (undated) DEVICE — SUCTION KIWI VAC OMNICUP DELIVERY SYSTEM VAC-6000MT

## (undated) DEVICE — GLOVE PROTEXIS MICRO 6.5  2D73PM65

## (undated) DEVICE — LIGHT HANDLE X2

## (undated) DEVICE — LINEN ORTHO PACK 5446

## (undated) DEVICE — DRSG TELFA 3X8" 1238

## (undated) DEVICE — ESU GROUND PAD UNIVERSAL W/O CORD

## (undated) DEVICE — SU PLAIN 3-0 CT 27" 852H

## (undated) DEVICE — SU PLAIN 0 TIE 54" S104H

## (undated) DEVICE — STRAP KNEE/BODY 31143004

## (undated) DEVICE — SUCTION CANISTER MEDIVAC LINER 3000ML W/LID 65651-530

## (undated) DEVICE — PREP CHLORAPREP 26ML TINTED ORANGE  260815

## (undated) DEVICE — PACK C-SECTION LF PL15OTA83B

## (undated) DEVICE — LINEN GOWN X4 5410

## (undated) DEVICE — CATH TRAY FOLEY SURESTEP 16FR WDRAIN BAG STLK LATEX A300316A

## (undated) DEVICE — BASIN SET MAJOR

## (undated) RX ORDER — KETOROLAC TROMETHAMINE 30 MG/ML
INJECTION, SOLUTION INTRAMUSCULAR; INTRAVENOUS
Status: DISPENSED
Start: 2018-01-12

## (undated) RX ORDER — DIPHENHYDRAMINE HYDROCHLORIDE 50 MG/ML
INJECTION INTRAMUSCULAR; INTRAVENOUS
Status: DISPENSED
Start: 2018-01-12